# Patient Record
Sex: FEMALE | Race: BLACK OR AFRICAN AMERICAN | Employment: FULL TIME | URBAN - METROPOLITAN AREA
[De-identification: names, ages, dates, MRNs, and addresses within clinical notes are randomized per-mention and may not be internally consistent; named-entity substitution may affect disease eponyms.]

---

## 2017-12-12 ENCOUNTER — HOSPITAL ENCOUNTER (OUTPATIENT)
Dept: LAB | Age: 82
Discharge: HOME | End: 2017-12-12
Payer: MEDICARE

## 2017-12-12 ENCOUNTER — OFFICE VISIT (OUTPATIENT)
Dept: FAMILY MEDICINE CLINIC | Age: 82
End: 2017-12-12

## 2017-12-12 VITALS
WEIGHT: 189.6 LBS | OXYGEN SATURATION: 98 % | SYSTOLIC BLOOD PRESSURE: 130 MMHG | BODY MASS INDEX: 32.37 KG/M2 | HEART RATE: 69 BPM | HEIGHT: 64 IN | RESPIRATION RATE: 20 BRPM | DIASTOLIC BLOOD PRESSURE: 78 MMHG | TEMPERATURE: 97.8 F

## 2017-12-12 DIAGNOSIS — R10.11 RIGHT UPPER QUADRANT ABDOMINAL PAIN: Primary | ICD-10-CM

## 2017-12-12 DIAGNOSIS — J06.9 UPPER RESPIRATORY TRACT INFECTION, UNSPECIFIED TYPE: ICD-10-CM

## 2017-12-12 DIAGNOSIS — Z71.89 ADVANCE CARE PLANNING: ICD-10-CM

## 2017-12-12 DIAGNOSIS — N39.3 URINARY, INCONTINENCE, STRESS FEMALE: ICD-10-CM

## 2017-12-12 DIAGNOSIS — Z13.6 SCREENING FOR CARDIOVASCULAR CONDITION: ICD-10-CM

## 2017-12-12 DIAGNOSIS — R10.11 RIGHT UPPER QUADRANT ABDOMINAL PAIN: ICD-10-CM

## 2017-12-12 LAB
ALBUMIN SERPL-MCNC: 3.8 G/DL (ref 3.4–5)
ALBUMIN/GLOB SERPL: 1.1 {RATIO} (ref 0.8–1.7)
ALP SERPL-CCNC: 84 U/L (ref 45–117)
ALT SERPL-CCNC: 24 U/L (ref 13–56)
ANION GAP SERPL CALC-SCNC: 6 MMOL/L (ref 3–18)
APPEARANCE UR: CLEAR
AST SERPL-CCNC: 23 U/L (ref 15–37)
BASOPHILS # BLD: 0 K/UL (ref 0–0.06)
BASOPHILS NFR BLD: 0 % (ref 0–2)
BILIRUB SERPL-MCNC: 0.8 MG/DL (ref 0.2–1)
BILIRUB UR QL: NEGATIVE
BUN SERPL-MCNC: 13 MG/DL (ref 7–18)
BUN/CREAT SERPL: 23 (ref 12–20)
CALCIUM SERPL-MCNC: 9.1 MG/DL (ref 8.5–10.1)
CHLORIDE SERPL-SCNC: 103 MMOL/L (ref 100–108)
CHOLEST SERPL-MCNC: 219 MG/DL
CO2 SERPL-SCNC: 33 MMOL/L (ref 21–32)
COLOR UR: YELLOW
CREAT SERPL-MCNC: 0.56 MG/DL (ref 0.6–1.3)
DIFFERENTIAL METHOD BLD: NORMAL
EOSINOPHIL # BLD: 0.1 K/UL (ref 0–0.4)
EOSINOPHIL NFR BLD: 1 % (ref 0–5)
ERYTHROCYTE [DISTWIDTH] IN BLOOD BY AUTOMATED COUNT: 13.5 % (ref 11.6–14.5)
GLOBULIN SER CALC-MCNC: 3.6 G/DL (ref 2–4)
GLUCOSE SERPL-MCNC: 122 MG/DL (ref 74–99)
GLUCOSE UR STRIP.AUTO-MCNC: NEGATIVE MG/DL
HCT VFR BLD AUTO: 42.2 % (ref 35–45)
HDLC SERPL-MCNC: 94 MG/DL (ref 40–60)
HDLC SERPL: 2.3 {RATIO} (ref 0–5)
HGB BLD-MCNC: 13.1 G/DL (ref 12–16)
HGB UR QL STRIP: NEGATIVE
KETONES UR QL STRIP.AUTO: NEGATIVE MG/DL
LDLC SERPL CALC-MCNC: 113.6 MG/DL (ref 0–100)
LEUKOCYTE ESTERASE UR QL STRIP.AUTO: NEGATIVE
LIPID PROFILE,FLP: ABNORMAL
LYMPHOCYTES # BLD: 1.7 K/UL (ref 0.9–3.6)
LYMPHOCYTES NFR BLD: 25 % (ref 21–52)
MCH RBC QN AUTO: 27.3 PG (ref 24–34)
MCHC RBC AUTO-ENTMCNC: 31 G/DL (ref 31–37)
MCV RBC AUTO: 87.9 FL (ref 74–97)
MONOCYTES # BLD: 0.6 K/UL (ref 0.05–1.2)
MONOCYTES NFR BLD: 8 % (ref 3–10)
NEUTS SEG # BLD: 4.5 K/UL (ref 1.8–8)
NEUTS SEG NFR BLD: 66 % (ref 40–73)
NITRITE UR QL STRIP.AUTO: NEGATIVE
PH UR STRIP: 7 [PH] (ref 5–8)
PLATELET # BLD AUTO: 211 K/UL (ref 135–420)
PMV BLD AUTO: 10 FL (ref 9.2–11.8)
POTASSIUM SERPL-SCNC: 4.2 MMOL/L (ref 3.5–5.5)
PROT SERPL-MCNC: 7.4 G/DL (ref 6.4–8.2)
PROT UR STRIP-MCNC: NEGATIVE MG/DL
RBC # BLD AUTO: 4.8 M/UL (ref 4.2–5.3)
SODIUM SERPL-SCNC: 142 MMOL/L (ref 136–145)
SP GR UR REFRACTOMETRY: 1.02 (ref 1–1.03)
TRIGL SERPL-MCNC: 57 MG/DL (ref ?–150)
UROBILINOGEN UR QL STRIP.AUTO: 0.2 EU/DL (ref 0.2–1)
VLDLC SERPL CALC-MCNC: 11.4 MG/DL
WBC # BLD AUTO: 6.8 K/UL (ref 4.6–13.2)

## 2017-12-12 PROCEDURE — 81003 URINALYSIS AUTO W/O SCOPE: CPT | Performed by: INTERNAL MEDICINE

## 2017-12-12 PROCEDURE — 36415 COLL VENOUS BLD VENIPUNCTURE: CPT | Performed by: INTERNAL MEDICINE

## 2017-12-12 PROCEDURE — 80061 LIPID PANEL: CPT | Performed by: INTERNAL MEDICINE

## 2017-12-12 PROCEDURE — 85025 COMPLETE CBC W/AUTO DIFF WBC: CPT | Performed by: INTERNAL MEDICINE

## 2017-12-12 PROCEDURE — 80053 COMPREHEN METABOLIC PANEL: CPT | Performed by: INTERNAL MEDICINE

## 2017-12-12 PROCEDURE — 87086 URINE CULTURE/COLONY COUNT: CPT | Performed by: INTERNAL MEDICINE

## 2017-12-12 PROCEDURE — 86803 HEPATITIS C AB TEST: CPT | Performed by: INTERNAL MEDICINE

## 2017-12-12 RX ORDER — MELATONIN
DAILY
COMMUNITY

## 2017-12-12 RX ORDER — OXYBUTYNIN CHLORIDE 5 MG/1
5 TABLET ORAL 3 TIMES DAILY
Qty: 90 TAB | Refills: 0 | Status: SHIPPED | OUTPATIENT
Start: 2017-12-12 | End: 2018-01-15 | Stop reason: SDUPTHER

## 2017-12-12 NOTE — ACP (ADVANCE CARE PLANNING)
Advance Care Planning (ACP) Provider Note - Comprehensive     Date of ACP Conversation: 12/12/17  Persons included in Conversation:  patient  Length of ACP Conversation in minutes:  <16 minutes (Non-Billable)    Authorized Decision Maker (if patient is incapable of making informed decisions): This person is:  Healthcare Agent/Medical Power of  under Advance Directive          General ACP for ALL Patients with Decision Making Capacity:   Importance of advance care planning, including choosing a healthcare agent to communicate patient's healthcare decisions if patient lost the ability to make decisions, such as after a sudden illness or accident  Understanding of the healthcare agent role was assessed and information provided  Exploration of values, goals, and preferences if recovery is not expected, even with continued medical treatment in the event of: Imminent death  Severe, permanent brain injury  \"In these circumstances, what matters most to you? \"  Care focused more on comfort or quality of life. Opportunity offered to explore how cultural, Mandaen, spiritual, or personal beliefs would affect decisions for future care       For Serious or Chronic Illness:  Understanding of medical condition    Understanding of CPR, goals and expected outcomes, benefits and burdens discussed. Information on CPR success rates provided (e.g. for CPR in hospital, survival to d/c at two weeks is 22%, for chronically ill or elderly/frail survival is less than 3%); Individual asked to communicate understanding of information in his/her own words.   Explored fears and concerns regarding CPR or possible outcomes    Interventions Provided:  Recommended completion of Advance Directive form after review of ACP materials and conversation with prospective healthcare agent   Recommended communicating the plan and making copies for the healthcare agent, personal physician, and others as appropriate (e.g., health system)  Recommended review of completed ACP document annually or upon change in health status

## 2017-12-12 NOTE — PROGRESS NOTES
1. Have you been to the ER, urgent care clinic since your last visit? Hospitalized since your last visit? No    2. Have you seen or consulted any other health care providers outside of the [address] [place] [address] since your last visit? Include any pap smears or colon screening.  No

## 2017-12-12 NOTE — MR AVS SNAPSHOT
Visit Information Date & Time Provider Department Dept. Phone Encounter #  
 12/12/2017  9:00 AM Damaris Tinoco, [address] [facility] [phone] [id number] Follow-up Instructions Return in about 4 weeks (around 1/9/2018) for follow-up. Follow-up and Disposition History Upcoming Health Maintenance Date Due DTaP/Tdap/Td series (1 - Tdap) 9/15/1956 ZOSTER VACCINE AGE 60> 7/15/1995 GLAUCOMA SCREENING Q2Y 9/15/2000 Pneumococcal 65+ Low/Medium Risk (1 of 2 - PCV13) 9/15/2000 MEDICARE YEARLY EXAM 9/15/2000 Allergies as of 12/12/2017  Review Complete On: 12/12/2017 By: Damaris Tinoco MD  
 No Known Allergies Current Immunizations  Never Reviewed No immunizations on file. Not reviewed this visit You Were Diagnosed With   
  
 Codes Comments Right upper quadrant abdominal pain    -  Primary ICD-10-CM: R10.11 ICD-9-CM: 789.01 Screening for cardiovascular condition     ICD-10-CM: Z13.6 ICD-9-CM: V81.2 Urinary, incontinence, stress female     ICD-10-CM: N39.3 ICD-9-CM: 625.6 Upper respiratory tract infection, unspecified type     ICD-10-CM: J06.9 ICD-9-CM: 465.9 Advance care planning     ICD-10-CM: Z71.89 ICD-9-CM: V65.49 Vitals BP Pulse Temp Resp Height(growth percentile) Weight(growth percentile) 130/78 69 97.8 °F (36.6 °C) (Oral) 20 5' 4\" (1.626 m) 189 lb 9.6 oz (86 kg) SpO2 BMI OB Status Smoking Status 98% 32.54 kg/m2 Postmenopausal Never Smoker BMI and BSA Data Body Mass Index Body Surface Area 32.54 kg/m 2 1.97 m 2 Preferred Pharmacy Pharmacy Name Phone [facility] D[address] Lowry [phone], [phone] [facility] [phone] Your Updated Medication List  
  
   
This list is accurate as of: 12/12/17  9:31 AM.  Always use your most recent med list.  
  
  
  
  
 calcium 500 mg Tab Take  by mouth. MULTIVITAMIN PO Take  by mouth. oxybutynin 5 mg tablet Commonly known as:  OWSIQKFV Take 1 Tab by mouth three (3) times daily. VITAMIN D3 1,000 unit tablet Generic drug:  cholecalciferol Take  by mouth daily. Prescriptions Sent to Pharmacy Refills  
 oxybutynin (DITROPAN) 5 mg tablet 0 Sig: Take 1 Tab by mouth three (3) times daily. Class: Normal  
 Pharmacy: [address] [place] [address] #: [phone] Route: Oral  
  
Follow-up Instructions Return in about 4 weeks (around 1/9/2018) for follow-up. To-Do List   
 12/12/2017 Lab:  CBC WITH AUTOMATED DIFF   
  
 12/12/2017 Microbiology:  CULTURE, URINE   
  
 12/12/2017 Lab:  HCV AB W/RFLX TO MARILY   
  
 12/12/2017 Lab:  LIPID PANEL   
  
 12/12/2017 Lab:  METABOLIC PANEL, COMPREHENSIVE Around 12/12/2017 Lab:  URINALYSIS W/ RFLX MICROSCOPIC Introducing [facility] & HEALTH SERVICES! Rico Fuller introduces ecoVent patient portal. Now you can access parts of your medical record, email your doctor's office, and request medication refills online. 1. In your internet browser, go to [email or web address] 2. Click on the First Time User? Click Here link in the Sign In box. You will see the New Member Sign Up page. 3. Enter your ecoVent Access Code exactly as it appears below. You will not need to use this code after youve completed the sign-up process. If you do not sign up before the expiration date, you must request a new code. · ecoVent Access Code: [email or web address] Expires: 3/12/2018  8:40 AM 
 
4. Enter the last four digits of your Social Security Number (xxxx) and Date of Birth (mm/dd/yyyy) as indicated and click Submit. You will be taken to the next sign-up page. 5. Create a Clear Blue Technologiest ID. This will be your Clear Blue Technologiest login ID and cannot be changed, so think of one that is secure and easy to remember. 6. Create a Purewine password. You can change your password at any time. 7. Enter your Password Reset Question and Answer. This can be used at a later time if you forget your password. 8. Enter your e-mail address. You will receive e-mail notification when new information is available in [address] 9. Click Sign Up. You can now view and download portions of your medical record. 10. Click the Download Summary menu link to download a portable copy of your medical information. If you have questions, please visit the Frequently Asked Questions section of the Purewine website. Remember, Purewine is NOT to be used for urgent needs. For medical emergencies, dial 911. Now available from your iPhone and Android! Please provide this summary of care documentation to your next provider. Your primary care clinician is listed as Jesus Garcia. If you have any questions after today's visit, please call [phone].

## 2017-12-13 LAB
HCV AB S/CO SERPL IA: 0.2 S/CO RATIO (ref 0–0.9)
HCV AB SERPL QL IA: NORMAL

## 2017-12-14 LAB
BACTERIA SPEC CULT: ABNORMAL
SERVICE CMNT-IMP: ABNORMAL

## 2018-01-09 ENCOUNTER — OFFICE VISIT (OUTPATIENT)
Dept: FAMILY MEDICINE CLINIC | Age: 83
End: 2018-01-09

## 2018-01-09 VITALS
DIASTOLIC BLOOD PRESSURE: 76 MMHG | RESPIRATION RATE: 16 BRPM | WEIGHT: 192.4 LBS | BODY MASS INDEX: 32.85 KG/M2 | SYSTOLIC BLOOD PRESSURE: 136 MMHG | OXYGEN SATURATION: 98 % | HEIGHT: 64 IN | HEART RATE: 66 BPM | TEMPERATURE: 96.6 F

## 2018-01-09 DIAGNOSIS — Z00.00 MEDICARE ANNUAL WELLNESS VISIT, SUBSEQUENT: ICD-10-CM

## 2018-01-09 DIAGNOSIS — N39.3 URINARY, INCONTINENCE, STRESS FEMALE: Primary | ICD-10-CM

## 2018-01-09 DIAGNOSIS — M85.80 OSTEOPENIA, SENILE: ICD-10-CM

## 2018-01-09 DIAGNOSIS — Z13.39 SCREENING FOR ALCOHOLISM: ICD-10-CM

## 2018-01-09 DIAGNOSIS — Z71.89 ADVANCE CARE PLANNING: ICD-10-CM

## 2018-01-09 DIAGNOSIS — Z13.31 SCREENING FOR DEPRESSION: ICD-10-CM

## 2018-01-09 DIAGNOSIS — E78.00 PURE HYPERCHOLESTEROLEMIA: ICD-10-CM

## 2018-01-09 DIAGNOSIS — R13.19 ESOPHAGEAL DYSPHAGIA: ICD-10-CM

## 2018-01-09 DIAGNOSIS — Z23 ENCOUNTER FOR IMMUNIZATION: ICD-10-CM

## 2018-01-09 PROBLEM — R10.11 RIGHT UPPER QUADRANT ABDOMINAL PAIN: Status: RESOLVED | Noted: 2017-12-12 | Resolved: 2018-01-09

## 2018-01-09 RX ORDER — PANTOPRAZOLE SODIUM 20 MG/1
20 TABLET, DELAYED RELEASE ORAL DAILY
Qty: 60 TAB | Refills: 0 | Status: SHIPPED | OUTPATIENT
Start: 2018-01-09 | End: 2018-03-13 | Stop reason: SDUPTHER

## 2018-01-09 NOTE — MR AVS SNAPSHOT
Visit Information Date & Time Provider Department Dept. Phone Encounter #  
 1/9/2018  9:00 AM Estrella Herrera, [address] [facility] [phone] [id number] Follow-up Instructions Return in about 2 months (around 3/9/2018) for rov. Upcoming Health Maintenance Date Due DTaP/Tdap/Td series (1 - Tdap) 9[birth year]15/1956 ZOSTER VACCINE AGE 60> 7/15/1995 GLAUCOMA SCREENING Q2Y 9/15/2000 Pneumococcal 65+ Low/Medium Risk (1 of 2 - PCV13) 9/15/2000 MEDICARE YEARLY EXAM 9/15/2000 Allergies as of 1/9/2018  Review Complete On: 1/9/2018 By: Estrella Herrera MD  
 No Known Allergies Current Immunizations  Never Reviewed Name Date Influenza High Dose Vaccine PF  Incomplete Pneumococcal Polysaccharide (PPSV-23)  Incomplete Not reviewed this visit You Were Diagnosed With   
  
 Codes Comments Urinary, incontinence, stress female    -  Primary ICD-10-CM: N39.3 ICD-9-CM: 625.6 Pure hypercholesterolemia     ICD-10-CM: E78.00 ICD-9-CM: 272.0 Esophageal dysphagia     ICD-10-CM: R13.10 ICD-9-CM: 787.20 Advance care planning     ICD-10-CM: Z71.89 ICD-9-CM: V65.49 Medicare annual wellness visit, subsequent     ICD-10-CM: Z00.00 ICD-9-CM: V70.0 Screening for depression     ICD-10-CM: Z13.89 ICD-9-CM: V79.0 Screening for alcoholism     ICD-10-CM: Z13.89 ICD-9-CM: V79.1 Encounter for immunization     ICD-10-CM: B27 ICD-9-CM: V03.89 Osteopenia, senile     ICD-10-CM: M85.80 ICD-9-CM: 733.90 Vitals BP Pulse Temp Resp Height(growth percentile) Weight(growth percentile) 136/76 (BP 1 Location: Left arm, BP Patient Position: At rest) 66 96.6 °F (35.9 °C) (Oral) 16 5' 4\" (1.626 m) 192 lb 6.4 oz (87.3 kg) SpO2 BMI OB Status Smoking Status 98% 33.03 kg/m2 Postmenopausal Never Smoker Vitals History BMI and BSA Data Body Mass Index Body Surface Area  33.03 kg/m 2 1.99 m 2  
  
  
 Preferred Pharmacy Pharmacy Name Phone [address] [facility] [phone] Your Updated Medication List  
  
   
This list is accurate as of: 18  9:47 AM.  Always use your most recent med list.  
  
  
  
  
 calcium 500 mg Tab Take  by mouth. diph,Pertuss(AC),Tet Vac-PF 2.5-8-5 Lf-mcg suspension Commonly known as:  BOOSTRIX  
0.5 mL by IntraMUSCular route once for 1 dose. MULTIVITAMIN PO Take  by mouth. oxybutynin 5 mg tablet Commonly known as:  TZEJRKMK Take 1 Tab by mouth three (3) times daily. pantoprazole 20 mg tablet Commonly known as:  PROTONIX Take 1 Tab by mouth daily. 30 minutes before breakfast  
  
 varicella zoster vaccine live 19,400 unit/0.65 mL Susr injection Commonly known as:  varicella-zoster vacine live 1 Vial by SubCUTAneous route once for 1 dose. VITAMIN D3 1,000 unit tablet Generic drug:  cholecalciferol Take  by mouth daily. Prescriptions Printed Refills diph,Pertuss,AC,,Tet Vac-PF (BOOSTRIX) 2.5-8-5 Lf-mcg suspension 0 Si.5 mL by IntraMUSCular route once for 1 dose. Class: Print Route: IntraMUSCular  
 varicella zoster vaccine live (VARICELLA-ZOSTER VACINE LIVE) 19,400 unit/0.65 mL susr injection 0 Si Vial by SubCUTAneous route once for 1 dose. Class: Print Route: SubCUTAneous Prescriptions Sent to Pharmacy Refills  
 pantoprazole (PROTONIX) 20 mg tablet 0 Sig: Take 1 Tab by mouth daily. 30 minutes before breakfast  
 Class: Normal  
 Pharmacy: [address] [place] [address]  [facility] Ph #: [phone] Route: Oral  
  
We Performed the Following ADMIN PNEUMOCOCCAL VACCINE [ HCPCS] [address] [[id number] HCPCS] INFLUENZA VIRUS VACCINE, HIGH DOSE SEASONAL, PRESERVATIVE FREE [31388 CPT(R)]  PNEUMOCOCCAL POLYSACCHARIDE VACCINE, 23-VALENT, ADULT OR IMMUNOSUPPRESSED PT DOSE, [id number] CPT(R)] AR ANNUAL ALCOHOL SCREEN 15 MIN [id number] [facility]] Follow-up Instructions Return in about 2 months (around 3/9/2018) for rov. Patient Instructions Medicare Wellness Visit, Female The best way to live healthy is to have a healthy lifestyle by eating a well-balanced diet, exercising regularly, limiting alcohol and stopping smoking. Regular physical exams and screening tests are another way to keep healthy. Preventive exams provided by your health care provider can find health problems before they become diseases or illnesses. Preventive services including immunizations, screening tests, monitoring and exams can help you take care of your own health. All people over age 72 should have a pneumovax  and and a prevnar shot to prevent pneumonia. These are once in a lifetime unless you and your provider decide differently. All people over 65 should have a yearly flu shot and a tetanus vaccine every 10 years. A bone mass density to screen for osteoporosis or thinning of the bones should be done every 2 years after 65. Screening for diabetes mellitus with a blood sugar test should be done every year. Glaucoma is a disease of the eye due to increased ocular pressure that can lead to blindness and it should be done every year by an eye professional. 
 
Cardiovascular screening tests that check for elevated lipids (fatty part of blood) which can lead to heart disease and strokes should be done every 5 years. Colorectal screening that evaluates for blood or polyps in your colon should be done yearly as a stool test or every five years as a flexible sigmoidoscope or every 10 years as a colonoscopy up to age 76. Breast cancer screening with a mammogram is recommended biennially  for women age 54-69.  
 
Screening for cervical cancer with a pap smear and pelvic exam is recommended for women after age 72 years every 2 years up to age 79 or when the provider and patient decide to stop. If there is a history of cervical abnormalities or other increased risk for cancer then the test is recommended yearly. Hepatitis C screening is also recommended for anyone born between 80 through [address]. A shingles vaccine is also recommended once in a lifetime after age 61. Your Medicare Wellness Exam is recommended annually. Here is a list of your current Health Maintenance items with a due date: 
Health Maintenance Due Topic Date Due  
 DTaP/Tdap/Td  (1 - Tdap) 09[birth year]15/1956  Shingles Vaccine  07/15/1995  Glaucoma Screening   09/15/2000  Pneumococcal Vaccine (1 of 2 - PCV13) 09/15/2000 [employer] Annual Well Visit  09/15/2000 Vaccine Information Statement Influenza (Flu) Vaccine (Inactivated or Recombinant): What you need to know Many Vaccine Information Statements are available in Greenlandic and other languages. See www.immunize.org/vis Hojas de Información Sobre Vacunas están disponibles en Español y en muchos otros idiomas. Visite www.immunize.org/vis 1. Why get vaccinated? Influenza (flu) is a contagious disease that spreads around the [facility] every year, usually between October and May. Flu is caused by influenza viruses, and is spread mainly by coughing, sneezing, and close contact. Anyone can get flu. Flu strikes suddenly and can last several days. Symptoms vary by age, but can include: 
 fever/chills  sore throat  muscle aches  fatigue  cough  headache  runny or stuffy nose Flu can also lead to pneumonia and blood infections, and cause diarrhea and seizures in children. If you have a medical condition, such as heart or lung disease, flu can make it worse. Flu is more dangerous for some people. Infants and young children, people 72years of age and older, pregnant women, and people with certain health conditions or a weakened immune system are at greatest risk. Each year thousands of people in the [facility] die from flu, and many more are hospitalized. Flu vaccine can: 
 keep you from getting flu, 
 make flu less severe if you do get it, and 
 keep you from spreading flu to your family and other people. 2. Inactivated and recombinant flu vaccines A dose of flu vaccine is recommended every flu season. Children 6 months through 6years of age may need two doses during the same flu season. Everyone else needs only one dose each flu season. Some inactivated flu vaccines contain a very small amount of a mercury-based preservative called thimerosal. Studies have not shown thimerosal in vaccines to be harmful, but flu vaccines that do not contain thimerosal are available. There is no live flu virus in flu shots. They cannot cause the flu. There are many flu viruses, and they are always changing. Each year a new flu vaccine is made to protect against three or four viruses that are likely to cause disease in the upcoming flu season. But even when the vaccine doesnt exactly match these viruses, it may still provide some protection Flu vaccine cannot prevent: 
 flu that is caused by a virus not covered by the vaccine, or 
 illnesses that look like flu but are not. It takes about 2 weeks for protection to develop after vaccination, and protection lasts through the flu season. 3. Some people should not get this vaccine Tell the person who is giving you the vaccine:  If you have any severe, life-threatening allergies. If you ever had a life-threatening allergic reaction after a dose of flu vaccine, or have a severe allergy to any part of this vaccine, you may be advised not to get vaccinated. Most, but not all, types of flu vaccine contain a small amount of egg protein.  If you ever had Guillain-Barré Syndrome (also called GBS). Some people with a history of GBS should not get this vaccine.  This should be discussed with your doctor.  If you are not feeling well. It is usually okay to get flu vaccine when you have a mild illness, but you might be asked to come back when you feel better. 4. Risks of a vaccine reaction With any medicine, including vaccines, there is a chance of reactions. These are usually mild and go away on their own, but serious reactions are also possible. Most people who get a flu shot do not have any problems with it. Minor problems following a flu shot include:  
 soreness, redness, or swelling where the shot was given  hoarseness  sore, red or itchy eyes  cough  fever  aches  headache  itching  fatigue If these problems occur, they usually begin soon after the shot and last 1 or 2 days. More serious problems following a flu shot can include the following:  There may be a small increased risk of Guillain-Barré Syndrome (GBS) after inactivated flu vaccine. This risk has been estimated at 1 or 2 additional cases per million people vaccinated. This is much lower than the risk of severe complications from flu, which can be prevented by flu vaccine.  Young children who get the flu shot along with pneumococcal vaccine (PCV13) and/or DTaP vaccine at the same time might be slightly more likely to have a seizure caused by fever. Ask your doctor for more information. Tell your doctor if a child who is getting flu vaccine has ever had a seizure. Problems that could happen after any injected vaccine:  People sometimes faint after a medical procedure, including vaccination. Sitting or lying down for about 15 minutes can help prevent fainting, and injuries caused by a fall. Tell your doctor if you feel dizzy, or have vision changes or ringing in the ears.  Some people get severe pain in the shoulder and have difficulty moving the arm where a shot was given. This happens very rarely.  Any medication can cause a severe allergic reaction. Such reactions from a vaccine are very rare, estimated at about 1 in a million doses, and would happen within a few minutes to a few hours after the vaccination. As with any medicine, there is a very remote chance of a vaccine causing a serious injury or death. The safety of vaccines is always being monitored. For more information, visit: www.cdc.gov/vaccinesafety/ 
 
5. What if there is a serious reaction? What should I look for?  Look for anything that concerns you, such as signs of a severe allergic reaction, very high fever, or unusual behavior. Signs of a severe allergic reaction can include hives, swelling of the face and throat, difficulty breathing, a fast heartbeat, dizziness, and weakness  usually within a few minutes to a few hours after the vaccination. What should I do?  If you think it is a severe allergic reaction or other emergency that cant wait, call 9-1-1 and get the person to the nearest hospital. Otherwise, call your doctor.  Reactions should be reported to the Vaccine Adverse Event Reporting System (VAERS). Your doctor should file this report, or you can do it yourself through  the VAERS web site at www.vaers. [facility].gov, or by calling 6-288.266.3455. VAERS does not give medical advice. 6. The National Vaccine Injury Compensation Program 
 
The [facility] Vaccine Injury Compensation Program (VICP) is a federal program that was created to compensate people who may have been injured by certain vaccines. Persons who believe they may have been injured by a vaccine can learn about the program and about filing a claim by calling 4-574.179.6391 or visiting the [employer] website at www.[facility].gov/vaccinecompensation. There is a time limit to file a claim for compensation. 7. How can I learn more?  Ask your healthcare provider. He or she can give you the vaccine package insert or suggest other sources of information.  Call your local or state health department.  Contact the Centers for Disease Control and Prevention (CDC): 
- Call 1-706.333.8208 (1-800-CDC-INFO) or 
- Visit CDCs website at www.cdc.gov/flu Vaccine Information Statement Inactivated Influenza Vaccine 8/7/2015 
[address] 420CM-50 [facility] and myJambi Centers for Disease Control and Prevention Office Use Only Introducing [facility] & HEALTH SERVICES! [employer] introduces UCAN patient portal. Now you can access parts of your medical record, email your doctor's office, and request medication refills online. 1. In your internet browser, go to [email or web address] 2. Click on the First Time User? Click Here link in the Sign In box. You will see the New Member Sign Up page. 3. Enter your UCAN Access Code exactly as it appears below. You will not need to use this code after youve completed the sign-up process. If you do not sign up before the expiration date, you must request a new code. · UCAN Access Code: [email or web address] Expires: 3/12/2018  8:40 AM 
 
4. Enter the last four digits of your Social Security Number (xxxx) and Date of Birth (mm/dd/yyyy) as indicated and click Submit. You will be taken to the next sign-up page. 5. Create a UCAN ID. This will be your UCAN login ID and cannot be changed, so think of one that is secure and easy to remember. 6. Create a UCAN password. You can change your password at any time. 7. Enter your Password Reset Question and Answer. This can be used at a later time if you forget your password. 8. Enter your e-mail address. You will receive e-mail notification when new information is available in [address] 9. Click Sign Up. You can now view and download portions of your medical record. 10. Click the Download Summary menu link to download a portable copy of your medical information. If you have questions, please visit the Frequently Asked Questions section of the ElectroCoret website. Remember, Clerky is NOT to be used for urgent needs. For medical emergencies, dial 911. Now available from your iPhone and Android! Please provide this summary of care documentation to your next provider. Your primary care clinician is listed as Iva Marinelli. If you have any questions after today's visit, please call [phone].

## 2018-01-09 NOTE — PROGRESS NOTES
History of Present Illness  Luciana Evans is a 80 y.o. female who presents today for management of    Chief Complaint   Patient presents with   [facility] Annual Wellness Visit         Dysphagia     x 2 weeks pt states she wakes up with heartburn or indigestion        Dysphagia  Patient complains of intermittent sensation of food being stuck in her throat. It occurs with solid food, but not with liquids. She admits to not chewing her food well. She also complains of neck tenderness. She is not sure if she has heartburn. No nausea, vomiting, weight loss, dark stools. Urinary Incontinence  She  also presents with  incontinence problems. Symptoms were first noted several months ago. Symptoms include: urine leakage with coughing/heavy physical activity and urge to urinate comes with little or no warning. She usually wears mini pads for protection. Previous treatment includes: oral medication. She denies hematuria, dysuria, vaginal burning, flank pain and fevers. She started oxybutynin one month ago with good results. Problem List  Patient Active Problem List    Diagnosis Date Noted    Pure hypercholesterolemia 01/09/2018    Osteopenia, senile 01/09/2018    Urinary, incontinence, stress female 12/12/2017    Advance care planning 12/12/2017       Past Medical History  Past Medical History:   Diagnosis Date    Osteopenia     Urinary incontinence         Surgical History  Past Surgical History:   Procedure Laterality Date    HX ANKLE FRACTURE TX      HX TUBAL LIGATION      25 years ago        Current Medications  Current Outpatient Prescriptions   Medication Sig    pantoprazole (PROTONIX) 20 mg tablet Take 1 Tab by mouth daily. 30 minutes before breakfast    diph,Pertuss,AC,,Tet Vac-PF (BOOSTRIX) 2.5-8-5 Lf-mcg suspension 0.5 mL by IntraMUSCular route once for 1 dose.     varicella zoster vaccine live (VARICELLA-ZOSTER VACINE LIVE) 19,400 unit/0.65 mL susr injection 1 Vial by SubCUTAneous route once for 1 dose.    cholecalciferol (VITAMIN D3) 1,000 unit tablet Take  by mouth daily.  oxybutynin (DITROPAN) 5 mg tablet Take 1 Tab by mouth three (3) times daily.  calcium 500 mg Tab Take  by mouth.  MULTIVITAMIN PO Take  by mouth. No current facility-administered medications for this visit. Allergies/Drug Reactions  No Known Allergies     Family History  Family History   Problem Relation Age of Onset    Hypertension Father         Social History  Social History     Social History    Marital status:      Spouse name: N/A    Number of children: N/A    Years of education: N/A     Occupational History    Not on file.      Social History Main Topics    Smoking status: Never Smoker    Smokeless tobacco: Never Used    Alcohol use No      Comment: occ    Drug use: No    Sexual activity: No     Other Topics Concern    Not on file     Social History Narrative       Review of Systems  General ROS: negative for - chills, fatigue or fever  Ophthalmic ROS: negative  ENT ROS: positive for - sore throat and vocal changes  Respiratory ROS: no cough, shortness of breath, or wheezing  Cardiovascular ROS: no chest pain or dyspnea on exertion  Gastrointestinal ROS: positive for - dysphagia  Genito-Urinary ROS: positive for - incontinence  Musculoskeletal ROS: negative  Neurological ROS: negative      Physical Exam  Vital signs:   Vitals:    01/09/18 0905   BP: 136/76   Pulse: 66   Resp: 16   Temp: 96.6 °F (35.9 °C)   TempSrc: Oral   SpO2: 98%   Weight: 192 lb 6.4 oz (87.3 kg)   Height: 5' 4\" (1.626 m)       General: alert, oriented, not in distress  Neck: supple, no tender or enlarged lymph nodes, nonpalpable thyroid  Chest/Lungs: clear breath sounds, no wheezing or crackles  Heart: normal rate, regular rhythm, no murmur  Abdomen: soft, non-distended, non-tender, normal bowel sounds, no organomegaly, no masses  Extremities: no focal deformities, no edema    Laboratory/Tests:    Component      Latest Ref Rng & Units 12/12/2017           9:36 AM   Sodium      136 - 145 mmol/L    Potassium      3.5 - 5.5 mmol/L    Chloride      100 - 108 mmol/L    CO2      21 - 32 mmol/L    Anion gap      3.0 - 18 mmol/L    Glucose      74 - 99 mg/dL    BUN      7.0 - 18 MG/DL    Creatinine      0.6 - 1.3 MG/DL    BUN/Creatinine ratio      12 - 20      GFR est AA      >60 ml/min/1.73m2    GFR est non-AA      >60 ml/min/1.73m2    Calcium      8.5 - 10.1 MG/DL    Bilirubin, total      0.2 - 1.0 MG/DL    ALT (SGPT)      13 - 56 U/L    AST      15 - 37 U/L    Alk. phosphatase      45 - 117 U/L    Protein, total      6.4 - 8.2 g/dL    Albumin      3.4 - 5.0 g/dL    Globulin      2.0 - 4.0 g/dL    A-G Ratio      0.8 - 1.7      Color          Appearance          Specific gravity      1.003 - 1.030      pH (UA)      5.0 - 8.0      Protein      NEG mg/dL    Glucose      NEG mg/dL    Ketone      NEG mg/dL    Bilirubin      NEG      Blood      NEG      Urobilinogen      0.2 - 1.0 EU/dL    Nitrites      NEG      Leukocyte Esterase      NEG      Special Requests:       NO SPECIAL REQUESTS   Culture result:       <10,000 COLONIES/mL PROTEUS SPECIES (A)     Component      Latest Ref Rng & Units 12/12/2017 12/12/2017           9:36 AM  9:36 AM   Sodium      136 - 145 mmol/L 142    Potassium      3.5 - 5.5 mmol/L 4.2    Chloride      100 - 108 mmol/L 103    CO2      21 - 32 mmol/L 33 (H)    Anion gap      3.0 - 18 mmol/L 6    Glucose      74 - 99 mg/dL 122 (H)    BUN      7.0 - 18 MG/DL 13    Creatinine      0.6 - 1.3 MG/DL 0.56 (L)    BUN/Creatinine ratio      12 - 20   23 (H)    GFR est AA      >60 ml/min/1.73m2 >60    GFR est non-AA      >60 ml/min/1.73m2 >60    Calcium      8.5 - 10.1 MG/DL 9.1    Bilirubin, total      0.2 - 1.0 MG/DL 0.8    ALT (SGPT)      13 - 56 U/L 24    AST      15 - 37 U/L 23    Alk.  phosphatase      45 - 117 U/L 84    Protein, total      6.4 - 8.2 g/dL 7.4    Albumin      3.4 - 5.0 g/dL 3.8    Globulin      2.0 - 4.0 g/dL 3.6    A-G Ratio      0.8 - 1.7   1.1    Color        YELLOW   Appearance        CLEAR   Specific gravity      1.003 - 1.030    1.020   pH (UA)      5.0 - 8.0    7.0   Protein      NEG mg/dL  NEGATIVE   Glucose      NEG mg/dL  NEGATIVE   Ketone      NEG mg/dL  NEGATIVE   Bilirubin      NEG    NEGATIVE   Blood      NEG    NEGATIVE   Urobilinogen      0.2 - 1.0 EU/dL  0.2   Nitrites      NEG    NEGATIVE   Leukocyte Esterase      NEG    NEGATIVE   Special Requests:           Culture result:             Component      Latest Ref Rng & Units 12/12/2017 12/12/2017 12/12/2017           9:36 AM  9:36 AM  9:36 AM   WBC      4.6 - 13.2 K/uL  6.8    RBC      4.20 - 5.30 M/uL  4.80    HGB      12.0 - 16.0 g/dL  13.1    HCT      35.0 - 45.0 %  42.2    MCV      74.0 - 97.0 FL  87.9    MCH      24.0 - 34.0 PG  27.3    MCHC      31.0 - 37.0 g/dL  31.0    RDW      11.6 - 14.5 %  13.5    PLATELET      057 - 600 K/uL  211    MPV      9.2 - 11.8 FL  10.0    NEUTROPHILS      40 - 73 %  66    LYMPHOCYTES      21 - 52 %  25    MONOCYTES      3 - 10 %  8    EOSINOPHILS      0 - 5 %  1    BASOPHILS      0 - 2 %  0    ABS. NEUTROPHILS      1.8 - 8.0 K/UL  4.5    ABS. LYMPHOCYTES      0.9 - 3.6 K/UL  1.7    ABS. MONOCYTES      0.05 - 1.2 K/UL  0.6    ABS. EOSINOPHILS      0.0 - 0.4 K/UL  0.1    ABS. BASOPHILS      0.0 - 0.06 K/UL  0.0    DF        AUTOMATED    Cholesterol, total      <200 MG/DL   219 (H)   Triglyceride      <150 MG/DL   57   HDL Cholesterol      40 - 60 MG/DL   94 (H)   LDL, calculated      0 - 100 MG/DL   113.6 (H)   VLDL, calculated      MG/DL   11.4   CHOL/HDL Ratio      0 - 5.0     2.3   HCV Ab      0.0 - 0.9 s/co ratio 0.2         Assessment/Plan:      1. Urinary, incontinence, stress female  - improved  - continue oxybutynin    2. Pure hypercholesterolemia  - diet-controlled  - low fat diet    3. Esophageal dysphagia  - trial of PPI: pantoprazole (PROTONIX) 20 mg tablet; Take 1 Tab by mouth daily.  30 minutes before breakfast  Dispense: 60 Tab; Refill: 0  - advised to chew food well before swallowing. Follow-up Disposition:  Return in about 2 months (around 3/9/2018) for rov. I have discussed the diagnosis with the patient and the intended plan as seen in the above orders. The patient has received an after-visit summary and questions were answered concerning future plans. I have discussed medication side effects and warnings with the patient as well. I have reviewed the plan of care with the patient, accepted their input and they are in agreement with the treatment goals. Jabari Rodriguez MD  January 9, 2018        This is a Subsequent Medicare Annual Wellness Exam (AWV) (Performed 12 months after IPPE or effective date of Medicare Part B enrollment)    I have reviewed the patient's medical history in detail and updated the computerized patient record. History     Past Medical History:   Diagnosis Date    Osteopenia     Urinary incontinence       Past Surgical History:   Procedure Laterality Date    HX ANKLE FRACTURE TX      HX TUBAL LIGATION      25 years ago     Current Outpatient Prescriptions   Medication Sig Dispense Refill    pantoprazole (PROTONIX) 20 mg tablet Take 1 Tab by mouth daily. 30 minutes before breakfast 60 Tab 0    diph,Pertuss,AC,,Tet Vac-PF (BOOSTRIX) 2.5-8-5 Lf-mcg suspension 0.5 mL by IntraMUSCular route once for 1 dose. 0.5 mL 0    varicella zoster vaccine live (VARICELLA-ZOSTER VACINE LIVE) 19,400 unit/0.65 mL susr injection 1 Vial by SubCUTAneous route once for 1 dose. 0.65 mL 0    cholecalciferol (VITAMIN D3) 1,000 unit tablet Take  by mouth daily.  oxybutynin (DITROPAN) 5 mg tablet Take 1 Tab by mouth three (3) times daily. 90 Tab 0    calcium 500 mg Tab Take  by mouth.  MULTIVITAMIN PO Take  by mouth.          No Known Allergies  Family History   Problem Relation Age of Onset    Hypertension Father      Social History   Substance Use Topics    Smoking status: Never Smoker    Smokeless tobacco: Never Used    Alcohol use No      Comment: occ     Patient Active Problem List   Diagnosis Code    Urinary, incontinence, stress female N39.3    Advance care planning Z71.89    Pure hypercholesterolemia E78.00    Osteopenia, senile M85.80       Depression Risk Factor Screening:     PHQ over the last two weeks 12/12/2017   Little interest or pleasure in doing things Not at all   Feeling down, depressed or hopeless Not at all   Total Score PHQ 2 0     Alcohol Risk Factor Screening: You do not drink alcohol or very rarely. Functional Ability and Level of Safety:   Hearing Loss  Hearing is good. Activities of Daily Living  The home contains: no safety equipment. Patient does total self care    Fall Risk  No flowsheet data found. Abuse Screen  Patient is not abused    Cognitive Screening   Evaluation of Cognitive Function:  Has your family/caregiver stated any concerns about your memory: no  Normal    Patient Care Team   Patient Care Team:  Talita Best MD as PCP - General (Internal Medicine)  Vince Mao MD (Family Practice)    Assessment/Plan   Education and counseling provided:  Are appropriate based on today's review and evaluation  End-of-Life planning (with patient's consent)  Pneumococcal Vaccine  Influenza Vaccine    Diagnoses and all orders for this visit:    1. Urinary, incontinence, stress female    2. Pure hypercholesterolemia    3. Esophageal dysphagia  -     pantoprazole (PROTONIX) 20 mg tablet; Take 1 Tab by mouth daily. 30 minutes before breakfast    4. Advance care planning    5. Medicare annual wellness visit, subsequent    6. Screening for depression  -     Depression Screen Annual    7. Screening for alcoholism  -     Annual  Alcohol Screen 15 min ()    8.  Encounter for immunization  -     diph,Pertuss,AC,,Tet Vac-PF (BOOSTRIX) 2.5-8-5 Lf-mcg suspension; 0.5 mL by IntraMUSCular route once for 1 dose.  -     varicella zoster vaccine live (VARICELLA-ZOSTER VACINE LIVE) 19,400 unit/0.65 mL susr injection; 1 Vial by SubCUTAneous route once for 1 dose. -     Influenza virus vaccine (FLUZONE HIGH-DOSE) 65 years and older  -     Pneumococcal conjugate 13 valent, IM (PREVNAR-13)    9.  Osteopenia, senile        Health Maintenance Due   Topic Date Due    DTaP/Tdap/Td series (1 - Tdap) 09/15/1956    ZOSTER VACCINE AGE 60>  07/15/1995    GLAUCOMA SCREENING Q2Y  09/15/2000    Pneumococcal 65+ Low/Medium Risk (1 of 2 - PCV13) 09/15/2000    MEDICARE YEARLY EXAM  09/15/2000

## 2018-01-09 NOTE — PATIENT INSTRUCTIONS
Medicare Wellness Visit, Female    The best way to live healthy is to have a healthy lifestyle by eating a well-balanced diet, exercising regularly, limiting alcohol and stopping smoking. Regular physical exams and screening tests are another way to keep healthy. Preventive exams provided by your health care provider can find health problems before they become diseases or illnesses. Preventive services including immunizations, screening tests, monitoring and exams can help you take care of your own health. All people over age 72 should have a pneumovax  and and a prevnar shot to prevent pneumonia. These are once in a lifetime unless you and your provider decide differently. All people over 65 should have a yearly flu shot and a tetanus vaccine every 10 years. A bone mass density to screen for osteoporosis or thinning of the bones should be done every 2 years after 65. Screening for diabetes mellitus with a blood sugar test should be done every year. Glaucoma is a disease of the eye due to increased ocular pressure that can lead to blindness and it should be done every year by an eye professional.    Cardiovascular screening tests that check for elevated lipids (fatty part of blood) which can lead to heart disease and strokes should be done every 5 years. Colorectal screening that evaluates for blood or polyps in your colon should be done yearly as a stool test or every five years as a flexible sigmoidoscope or every 10 years as a colonoscopy up to age 76. Breast cancer screening with a mammogram is recommended biennially  for women age 54-69. Screening for cervical cancer with a pap smear and pelvic exam is recommended for women after age 72 years every 2 years up to age 79 or when the provider and patient decide to stop. If there is a history of cervical abnormalities or other increased risk for cancer then the test is recommended yearly.     Hepatitis C screening is also recommended for anyone born between [birth year] through [facility]. A shingles vaccine is also recommended once in a lifetime after age 61. Your Medicare Wellness Exam is recommended annually. Here is a list of your current Health Maintenance items with a due date:  Health Maintenance Due   Topic Date Due    DTaP/Tdap/Td  (1 - Tdap) 09/15/1956    Shingles Vaccine  07/15/1995    Glaucoma Screening   09/15/2000    Pneumococcal Vaccine (1 of 2 - PCV13) 09/15/2000    Annual Well Visit  09/15/2000         Vaccine Information Statement    Influenza (Flu) Vaccine (Inactivated or Recombinant): What you need to know    Many Vaccine Information Statements are available in Yi and other languages. See www.immunize.org/vis  Hojas de Información Sobre Vacunas están disponibles en Español y en muchos otros idiomas. Visite www.immunize.org/vis    1. Why get vaccinated? Influenza (flu) is a contagious disease that spreads around the [place] every year, usually between October and May. Flu is caused by influenza viruses, and is spread mainly by coughing, sneezing, and close contact. Anyone can get flu. Flu strikes suddenly and can last several days. Symptoms vary by age, but can include:   fever/chills   sore throat   muscle aches   fatigue   cough   headache    runny or stuffy nose    Flu can also lead to pneumonia and blood infections, and cause diarrhea and seizures in children. If you have a medical condition, such as heart or lung disease, flu can make it worse. Flu is more dangerous for some people. Infants and young children, people 72years of age and older, pregnant women, and people with certain health conditions or a weakened immune system are at greatest risk. Each year thousands of people in the [facility] die from flu, and many more are hospitalized.      Flu vaccine can:   keep you from getting flu,   make flu less severe if you do get it, and   keep you from spreading flu to your family and other people. 2. Inactivated and recombinant flu vaccines    A dose of flu vaccine is recommended every flu season. Children 6 months through 6years of age may need two doses during the same flu season. Everyone else needs only one dose each flu season. Some inactivated flu vaccines contain a very small amount of a mercury-based preservative called thimerosal. Studies have not shown thimerosal in vaccines to be harmful, but flu vaccines that do not contain thimerosal are available. There is no live flu virus in flu shots. They cannot cause the flu. There are many flu viruses, and they are always changing. Each year a new flu vaccine is made to protect against three or four viruses that are likely to cause disease in the upcoming flu season. But even when the vaccine doesnt exactly match these viruses, it may still provide some protection    Flu vaccine cannot prevent:   flu that is caused by a virus not covered by the vaccine, or   illnesses that look like flu but are not. It takes about 2 weeks for protection to develop after vaccination, and protection lasts through the flu season. 3. Some people should not get this vaccine    Tell the person who is giving you the vaccine:     If you have any severe, life-threatening allergies. If you ever had a life-threatening allergic reaction after a dose of flu vaccine, or have a severe allergy to any part of this vaccine, you may be advised not to get vaccinated. Most, but not all, types of flu vaccine contain a small amount of egg protein.  If you ever had Guillain-Barré Syndrome (also called GBS). Some people with a history of GBS should not get this vaccine. This should be discussed with your doctor.  If you are not feeling well. It is usually okay to get flu vaccine when you have a mild illness, but you might be asked to come back when you feel better.       4. Risks of a vaccine reaction    With any medicine, including vaccines, there is a chance of reactions. These are usually mild and go away on their own, but serious reactions are also possible. Most people who get a flu shot do not have any problems with it. Minor problems following a flu shot include:    soreness, redness, or swelling where the shot was given     hoarseness   sore, red or itchy eyes   cough   fever   aches   headache   itching   fatigue  If these problems occur, they usually begin soon after the shot and last 1 or 2 days. More serious problems following a flu shot can include the following:     There may be a small increased risk of Guillain-Barré Syndrome (GBS) after inactivated flu vaccine. This risk has been estimated at 1 or 2 additional cases per million people vaccinated. This is much lower than the risk of severe complications from flu, which can be prevented by flu vaccine.  Young children who get the flu shot along with pneumococcal vaccine (PCV13) and/or DTaP vaccine at the same time might be slightly more likely to have a seizure caused by fever. Ask your doctor for more information. Tell your doctor if a child who is getting flu vaccine has ever had a seizure. Problems that could happen after any injected vaccine:      People sometimes faint after a medical procedure, including vaccination. Sitting or lying down for about 15 minutes can help prevent fainting, and injuries caused by a fall. Tell your doctor if you feel dizzy, or have vision changes or ringing in the ears.  Some people get severe pain in the shoulder and have difficulty moving the arm where a shot was given. This happens very rarely.  Any medication can cause a severe allergic reaction. Such reactions from a vaccine are very rare, estimated at about 1 in a million doses, and would happen within a few minutes to a few hours after the vaccination.     As with any medicine, there is a very remote chance of a vaccine causing a serious injury or death. The safety of vaccines is always being monitored. For more information, visit: www.cdc.gov/vaccinesafety/    5. What if there is a serious reaction? What should I look for?  Look for anything that concerns you, such as signs of a severe allergic reaction, very high fever, or unusual behavior. Signs of a severe allergic reaction can include hives, swelling of the face and throat, difficulty breathing, a fast heartbeat, dizziness, and weakness  usually within a few minutes to a few hours after the vaccination. What should I do?  If you think it is a severe allergic reaction or other emergency that cant wait, call 9-1-1 and get the person to the nearest hospital. Otherwise, call your doctor.  Reactions should be reported to the Vaccine Adverse Event Reporting System (VAERS). Your doctor should file this report, or you can do it yourself through  the VAERS web site at www.vaers. hhs.gov, or by calling 9-774.976.8987. VAERS does not give medical advice. 6. The National Vaccine Injury Compensation Program    The [facility] Vaccine Injury Compensation Program (VICP) is a federal program that was created to compensate people who may have been injured by certain vaccines. Persons who believe they may have been injured by a vaccine can learn about the program and about filing a claim by calling 3-554.229.4262 or visiting the [facility] [employer] website at [email or web address]/vaccinecompensation. There is a time limit to file a claim for compensation. 7. How can I learn more?  Ask your healthcare provider. He or she can give you the vaccine package insert or suggest other sources of information.  Call your local or state health department.  Contact the Centers for Disease Control and Prevention (CDC):  - Call 7-724.165.6430 (1-800-CDC-INFO) or  - Visit CDCs website at www.cdc.gov/flu    Vaccine Information Statement   Inactivated Influenza Vaccine   8/7/2015  [address] [id number]    Department of Health and Human Services  Centers for Disease Control and Prevention    Office Use Only      Vaccine Information Statement     Pneumococcal Conjugate Vaccine (PCV13): What You Need to Know    Many Vaccine Information Statements are available in Vietnamese and other languages. See www.immunize.org/vis. Hojas de información Sobre Vacunas están disponibles en español y en muchos otros idiomas. Visite www.immunize.org/vis. 1. Why get vaccinated? Vaccination can protect both children and adults from pneumococcal disease. Pneumococcal disease is caused by bacteria that can spread from person to person through close contact. It can cause ear infections, and it can also lead to more serious infections of the:   Lungs (pneumonia),   Blood (bacteremia), and   Covering of the brain and spinal cord (meningitis). Pneumococcal pneumonia is most common among adults. Pneumococcal meningitis can cause deafness and brain damage, and it kills about 1 child in 10 who get it. Anyone can get pneumococcal disease, but children under 3years of age and adults 72 years and older, people with certain medical conditions, and cigarette smokers are at the highest risk. Before there was a vaccine, the [facility] saw:   more than 700 cases of meningitis,   about 13,000 blood infections,   about 5 million ear infections, and   about 200 deaths  in children under 5 each year from pneumococcal disease. Since vaccine became available, severe pneumococcal disease in these children has fallen by 88%. About 18,000 older adults die of pneumococcal disease each year in the [place]. Treatment of pneumococcal infections with penicillin and other drugs is not as effective as it used to be, because some strains of the disease have become resistant to these drugs. This makes prevention of the disease, through vaccination, even more important.     2. PCV13 vaccine    Pneumococcal conjugate vaccine (called PCV13) protects against 13 types of pneumococcal bacteria. PCV13 is routinely given to children at 2, 4, 6, and 1515 months of age. It is also recommended for children and adults 3to 59years of age with certain health conditions, and for all adults 72years of age and older. Your doctor can give you details. 3. Some people should not get this vaccine    Anyone who has ever had a life-threatening allergic reaction to a dose of this vaccine, to an earlier pneumococcal vaccine called PCV7, or to any vaccine containing diphtheria toxoid (for example, DTaP), should not get PCV13. Anyone with a severe allergy to any component of PCV13 should not get the vaccine. Tell your doctor if the person being vaccinated has any severe allergies. If the person scheduled for vaccination is not feeling well, your healthcare provider might decide to reschedule the shot on another day. 4. Risks of a vaccine reaction    With any medicine, including vaccines, there is a chance of reactions. These are usually mild and go away on their own, but serious reactions are also possible. Problems reported following PCV13 varied by age and dose in the series. The most common problems reported among children were:    About half became drowsy after the shot, had a temporary loss of appetite, or had redness or tenderness where the shot was given.  About 1 out of 3 had swelling where the shot was given.  About 1 out of 3 had a mild fever, and about 1 in 20 had a fever over 102.2°F.   Up to about 8 out of 10 became fussy or irritable. Adults have reported pain, redness, and swelling where the shot was given; also mild fever, fatigue, headache, chills, or muscle pain. Israel Coleman children who get PCV13 along with inactivated flu vaccine at the same time may be at increased risk for seizures caused by fever. Ask your doctor for more information.      Problems that could happen after any vaccine:     People sometimes faint after a medical procedure, including vaccination. Sitting or lying down for about 15 minutes can help prevent fainting, and injuries caused by a fall. Tell your doctor if you feel dizzy, or have vision changes or ringing in the ears.  Some older children and adults get severe pain in the shoulder and have difficulty moving the arm where a shot was given. This happens very rarely.  Any medication can cause a severe allergic reaction. Such reactions from a vaccine are very rare, estimated at about 1 in a million doses, and would happen within a few minutes to a few hours after the vaccination. As with any medicine, there is a very small chance of a vaccine causing a serious injury or death. The safety of vaccines is always being monitored. For more information, visit: www.cdc.gov/vaccinesafety/     5. What if there is a serious reaction? What should I look for?  Look for anything that concerns you, such as signs of a severe allergic reaction, very high fever, or unusual behavior. Signs of a severe allergic reaction can include hives, swelling of the face and throat, difficulty breathing, a fast heartbeat, dizziness, and weakness  usually within a few minutes to a few hours after the vaccination. What should I do?  If you think it is a severe allergic reaction or other emergency that cant wait, call 9-1-1 or get the person to the nearest hospital. Otherwise, call your doctor. Reactions should be reported to the Vaccine Adverse Event Reporting System (VAERS). Your doctor should file this report, or you can do it yourself through the VAERS web site at www.vaers. hhs.gov, or by calling 4-140.307.2815. VAERS does not give medical advice. 6. The National Vaccine Injury Compensation Program    The [facility] Vaccine Injury Compensation Program (VICP) is a federal program that was created to compensate people who may have been injured by certain vaccines.     Persons who believe they may have been injured by a vaccine can learn about the program and about filing a claim by calling 8-896.326.9873 or visiting the [facility] [employer] website at [email or web address]/[email or web address]. There is a time limit to file a claim for compensation. 7. How can I learn more?  Ask your healthcare provider. He or she can give you the vaccine package insert or suggest other sources of information.  Call your local or state health department.  Contact the Centers for Disease Control and Prevention (CDC):  - Call 3-288.380.5873 (1-800-CDC-INFO) or  - Visit CDCs website at www.cdc.gov/vaccines    Vaccine Information Statement   PCV13 Vaccine   11/5/2015   42 [address] [id number]    Department of Health and Human Services  Centers for Disease Control and Prevention    Office Use Only

## 2018-01-09 NOTE — PROGRESS NOTES
Chief Complaint   Patient presents with   Joyce Cisse Annual Wellness Visit         Dysphagia     x 2 weeks pt states she wakes up with heartburn or indigestion

## 2018-01-09 NOTE — ACP (ADVANCE CARE PLANNING)
Advance Care Planning (ACP) Provider Note - Comprehensive     Date of ACP Conversation: 01/09/18  Persons included in Conversation:  patient  Length of ACP Conversation in minutes:  <16 minutes (Non-Billable)    Authorized Decision Maker (if patient is incapable of making informed decisions): This person is:  Healthcare Agent/Medical Power of  under Advance Directive          General ACP for ALL Patients with Decision Making Capacity:   Importance of advance care planning, including choosing a healthcare agent to communicate patient's healthcare decisions if patient lost the ability to make decisions, such as after a sudden illness or accident  Understanding of the healthcare agent role was assessed and information provided  Exploration of values, goals, and preferences if recovery is not expected, even with continued medical treatment in the event of: Imminent death  Severe, permanent brain injury  \"In these circumstances, what matters most to you? \" Patient will discuss with her children. Opportunity offered to explore how cultural, Synagogue, spiritual, or personal beliefs would affect decisions for future care     For Serious or Chronic Illness:  Understanding of medical condition    Understanding of CPR, goals and expected outcomes, benefits and burdens discussed. Information on CPR success rates provided (e.g. for CPR in hospital, survival to d/c at two weeks is 22%, for chronically ill or elderly/frail survival is less than 3%); Individual asked to communicate understanding of information in his/her own words.   Explored fears and concerns regarding CPR or possible outcomes    Interventions Provided:  Recommended completion of Advance Directive form after review of ACP materials and conversation with prospective healthcare agent   Recommended communicating the plan and making copies for the healthcare agent, personal physician, and others as appropriate (e.g., health system)  Recommended review of completed ACP document annually or upon change in health status

## 2018-01-15 DIAGNOSIS — N39.3 URINARY, INCONTINENCE, STRESS FEMALE: ICD-10-CM

## 2018-01-16 RX ORDER — OXYBUTYNIN CHLORIDE 5 MG/1
5 TABLET ORAL 3 TIMES DAILY
Qty: 90 TAB | Refills: 0 | Status: SHIPPED | OUTPATIENT
Start: 2018-01-16 | End: 2018-03-13 | Stop reason: SDUPTHER

## 2018-03-13 ENCOUNTER — OFFICE VISIT (OUTPATIENT)
Dept: FAMILY MEDICINE CLINIC | Age: 83
End: 2018-03-13

## 2018-03-13 VITALS
WEIGHT: 188.2 LBS | OXYGEN SATURATION: 100 % | SYSTOLIC BLOOD PRESSURE: 129 MMHG | HEART RATE: 69 BPM | RESPIRATION RATE: 20 BRPM | BODY MASS INDEX: 32.13 KG/M2 | DIASTOLIC BLOOD PRESSURE: 71 MMHG | HEIGHT: 64 IN | TEMPERATURE: 95.9 F

## 2018-03-13 DIAGNOSIS — N39.3 URINARY, INCONTINENCE, STRESS FEMALE: ICD-10-CM

## 2018-03-13 DIAGNOSIS — E78.00 PURE HYPERCHOLESTEROLEMIA: Primary | ICD-10-CM

## 2018-03-13 DIAGNOSIS — R13.19 ESOPHAGEAL DYSPHAGIA: ICD-10-CM

## 2018-03-13 DIAGNOSIS — M85.80 OSTEOPENIA, SENILE: ICD-10-CM

## 2018-03-13 RX ORDER — OXYBUTYNIN CHLORIDE 5 MG/1
5 TABLET ORAL 3 TIMES DAILY
Qty: 90 TAB | Refills: 5 | Status: SHIPPED | OUTPATIENT
Start: 2018-03-13 | End: 2018-08-02 | Stop reason: CLARIF

## 2018-03-13 RX ORDER — PANTOPRAZOLE SODIUM 40 MG/1
40 TABLET, DELAYED RELEASE ORAL DAILY
Qty: 30 TAB | Refills: 2 | Status: SHIPPED | OUTPATIENT
Start: 2018-03-13 | End: 2018-06-12 | Stop reason: SDUPTHER

## 2018-03-13 NOTE — PROGRESS NOTES
History of Present Illness  Luciana Evans is a 80 y.o. female who presents today for management of    Chief Complaint   Patient presents with    Follow-up    Neurogenic Bladder       Urinary Incontinence  She  is an 80 y.o. female seen for  incontinence problems. Symptoms were first noted several months ago. Symptoms include: urge to urinate comes with little or no warning and unpredictable urine leakage. She usually wears heavy pads for protection. Previous treatment includes: oral medication. She denies hematuria, dysuria, vaginal burning and rectal incontinence. She had good results after starting oxybutynin. Dysphagia  Patient complains of intermittent sensation of food being stuck in her throat. It occurs with solid food, but not with liquids. She admits to not chewing her food well. She also complains of neck tenderness. She is not sure if she has heartburn. No nausea, vomiting, weight loss, dark stools. She had never had an endoscopy. Cardiovascular Review:  The patient has hyperlipidemia. Diet and Lifestyle: generally follows a low fat low cholesterol diet, sedentary, nonsmoker  Pertinent ROS: no TIA's, no chest pain on exertion, no dyspnea on exertion, no swelling of ankles. Problem List  Patient Active Problem List    Diagnosis Date Noted    Pure hypercholesterolemia 01/09/2018    Osteopenia, senile 01/09/2018    Urinary, incontinence, stress female 12/12/2017    Advance care planning 12/12/2017       Past Medical History  Past Medical History:   Diagnosis Date    Osteopenia     Urinary incontinence         Surgical History  Past Surgical History:   Procedure Laterality Date    HX ANKLE FRACTURE TX      HX TUBAL LIGATION      25 years ago        Current Medications  Current Outpatient Prescriptions   Medication Sig    oxybutynin (DITROPAN) 5 mg tablet Take 1 Tab by mouth three (3) times daily.  pantoprazole (PROTONIX) 40 mg tablet Take 1 Tab by mouth daily.  30 minutes before breakfast    cholecalciferol (VITAMIN D3) 1,000 unit tablet Take  by mouth daily.  calcium 500 mg Tab Take  by mouth.  MULTIVITAMIN PO Take  by mouth. No current facility-administered medications for this visit. Allergies/Drug Reactions  No Known Allergies     Family History  Family History   Problem Relation Age of Onset    Hypertension Father         Social History  Social History     Social History    Marital status:      Spouse name: N/A    Number of children: N/A    Years of education: N/A     Occupational History    Not on file.      Social History Main Topics    Smoking status: Never Smoker    Smokeless tobacco: Never Used    Alcohol use No      Comment: occ    Drug use: No    Sexual activity: No     Other Topics Concern    Not on file     Social History Narrative       Review of Systems  General ROS: negative  Psychological ROS: negative  Ophthalmic ROS: negative  ENT ROS: negative  Breast ROS: negative for breast lumps  Respiratory ROS: no cough, shortness of breath, or wheezing  Cardiovascular ROS: no chest pain or dyspnea on exertion  Gastrointestinal ROS: no abdominal pain, change in bowel habits, or black or bloody stools  positive for - dysphagia  Genito-Urinary ROS: no dysuria, trouble voiding, or hematuria  positive for - incontinence  Musculoskeletal ROS: negative  Neurological ROS: no TIA or stroke symptoms      Physical Exam  Vital signs:   Vitals:    03/13/18 0848   BP: 129/71   Pulse: 69   Resp: 20   Temp: 95.9 °F (35.5 °C)   TempSrc: Oral   SpO2: 100%   Weight: 188 lb 3.2 oz (85.4 kg)   Height: 5' 4\" (1.626 m)       General: alert, oriented, not in distress  Chest/Lungs: clear breath sounds, no wheezing or crackles  Heart: normal rate, regular rhythm, no murmur  Abdomen: soft, non-distended, non-tender, normal bowel sounds, no organomegaly, no masses  Extremities: no focal deformities, no edema    Laboratory/Tests:  Component      Latest Ref Rng & Units 12/12/2017 12/12/2017 12/12/2017           9:36 AM  9:36 AM  9:36 AM   Sodium      136 - 145 mmol/L 142     Potassium      3.5 - 5.5 mmol/L 4.2     Chloride      100 - 108 mmol/L 103     CO2      21 - 32 mmol/L 33 (H)     Anion gap      3.0 - 18 mmol/L 6     Glucose      74 - 99 mg/dL 122 (H)     BUN      7.0 - 18 MG/DL 13     Creatinine      0.6 - 1.3 MG/DL 0.56 (L)     BUN/Creatinine ratio      12 - 20   23 (H)     GFR est AA      >60 ml/min/1.73m2 >60     GFR est non-AA      >60 ml/min/1.73m2 >60     Calcium      8.5 - 10.1 MG/DL 9.1     Bilirubin, total      0.2 - 1.0 MG/DL 0.8     ALT (SGPT)      13 - 56 U/L 24     AST      15 - 37 U/L 23     Alk.  phosphatase      45 - 117 U/L 84     Protein, total      6.4 - 8.2 g/dL 7.4     Albumin      3.4 - 5.0 g/dL 3.8     Globulin      2.0 - 4.0 g/dL 3.6     A-G Ratio      0.8 - 1.7   1.1     Color         YELLOW   Appearance         CLEAR   Specific gravity      1.003 - 1.030     1.020   pH (UA)      5.0 - 8.0     7.0   Protein      NEG mg/dL   NEGATIVE   Glucose      NEG mg/dL   NEGATIVE   Ketone      NEG mg/dL   NEGATIVE   Bilirubin      NEG     NEGATIVE   Blood      NEG     NEGATIVE   Urobilinogen      0.2 - 1.0 EU/dL   0.2   Nitrites      NEG     NEGATIVE   Leukocyte Esterase      NEG     NEGATIVE   Cholesterol, total      <200 MG/DL  219 (H)    Triglyceride      <150 MG/DL  57    HDL Cholesterol      40 - 60 MG/DL  94 (H)    LDL, calculated      0 - 100 MG/DL  113.6 (H)    VLDL, calculated      MG/DL  11.4    CHOL/HDL Ratio      0 - 5.0    2.3      Component      Latest Ref Rng & Units 12/12/2017 12/12/2017           9:36 AM  9:36 AM   WBC      4.6 - 13.2 K/uL  6.8   RBC      4.20 - 5.30 M/uL  4.80   HGB      12.0 - 16.0 g/dL  13.1   HCT      35.0 - 45.0 %  42.2   MCV      74.0 - 97.0 FL  87.9   MCH      24.0 - 34.0 PG  27.3   MCHC      31.0 - 37.0 g/dL  31.0   RDW      11.6 - 14.5 %  13.5   PLATELET      415 - 323 K/uL  211   MPV      9.2 - 11.8 FL  10.0 NEUTROPHILS      40 - 73 %  66   LYMPHOCYTES      21 - 52 %  25   MONOCYTES      3 - 10 %  8   EOSINOPHILS      0 - 5 %  1   BASOPHILS      0 - 2 %  0   ABS. NEUTROPHILS      1.8 - 8.0 K/UL  4.5   ABS. LYMPHOCYTES      0.9 - 3.6 K/UL  1.7   ABS. MONOCYTES      0.05 - 1.2 K/UL  0.6   ABS. EOSINOPHILS      0.0 - 0.4 K/UL  0.1   ABS. BASOPHILS      0.0 - 0.06 K/UL  0.0   DF        AUTOMATED   HCV Ab      0.0 - 0.9 s/co ratio 0.2        Assessment/Plan:      1. Pure hypercholesterolemia  - diet-controlled  - low fat diet    2. Osteopenia, senile  - continue calcium with vitamin D  - continue exercises    3. Urinary, incontinence, stress female  - improved  - continue oxybutynin (DITROPAN) 5 mg tablet; Take 1 Tab by mouth three (3) times daily. Dispense: 90 Tab; Refill: 5    4. Esophageal dysphagia  - XR UPPER GI W KUB AIR CONT; Future  - increase dose of  pantoprazole (PROTONIX) 40 mg tablet; Take 1 Tab by mouth daily. 30 minutes before breakfast  Dispense: 30 Tab; Refill: 2    Total visit time was 25 minutes of which more than 50% was devoted to counseling and coordination of care. Follow-up Disposition:  Return in about 3 months (around 6/13/2018) for rov. I have discussed the diagnosis with the patient and the intended plan as seen in the above orders. The patient has received an after-visit summary and questions were answered concerning future plans. I have discussed medication side effects and warnings with the patient as well. I have reviewed the plan of care with the patient, accepted their input and they are in agreement with the treatment goals.        Elvia Mccall MD  March 13, 2018

## 2018-03-13 NOTE — MR AVS SNAPSHOT
[facility] 
 
 
 [address] [facility] [place]note 2[place] [address] [facility] [address] 
[phone] Patient: Robert Campoverde MRN: [id number] GRT:[id number][birth year]27/9431 Visit Information Date & Time Provider Department Dept. Phone Encounter #  
 3/13/2018  8:00 AM Maricel Hernandez [address] [phone] [id number] Follow-up Instructions Return in about 3 months (around 6/13/2018) for rov. Upcoming Health Maintenance Date Due DTaP/Tdap/Td series (1 - Tdap) 9/15/1956 ZOSTER VACCINE AGE 60> 7/15/1995 GLAUCOMA SCREENING Q2Y 9/15/2000 Pneumococcal 65+ Low/Medium Risk (2 of 2 - PPSV23) 1/9/2019 MEDICARE YEARLY EXAM 1/10/2019 Allergies as of 3/13/2018  Review Complete On: 3/13/2018 By: Israel Pereira LPN No Known Allergies Current Immunizations  Never Reviewed Name Date Influenza High Dose Vaccine PF 1/9/2018  9:55 AM  
 Pneumococcal Conjugate (PCV-13) 1/9/2018  9:55 AM  
  
 Not reviewed this visit You Were Diagnosed With   
  
 Codes Comments Pure hypercholesterolemia    -  Primary ICD-10-CM: E78.00 ICD-9-CM: 272.0 Osteopenia, senile     ICD-10-CM: M85.80 ICD-9-CM: 733.90 Urinary, incontinence, stress female     ICD-10-CM: N39.3 ICD-9-CM: 625.6 Esophageal dysphagia     ICD-10-CM: R13.10 ICD-9-CM: 787.20 Vitals BP Pulse Temp Resp Height(growth percentile) Weight(growth percentile) 129/71 69 95.9 °F (35.5 °C) (Oral) 20 5' 4\" (1.626 m) 188 lb 3.2 oz (85.4 kg) SpO2 BMI OB Status Smoking Status 100% 32.3 kg/m2 Postmenopausal Never Smoker BMI and BSA Data Body Mass Index Body Surface Area  
 32.3 kg/m 2 1.96 m 2 Preferred Pharmacy Pharmacy Name Phone [address], [address] [facility] [phone] Your Updated Medication List  
  
   
This list is accurate as of 3/13/18  9:02 AM.  Always use your most recent med list.  
  
  
  
  
 calcium 500 mg Tab Take  by mouth. MULTIVITAMIN PO Take  by mouth. oxybutynin 5 mg tablet Commonly known as:  JPGHDQUW Take 1 Tab by mouth three (3) times daily. pantoprazole 40 mg tablet Commonly known as:  PROTONIX Take 1 Tab by mouth daily. 30 minutes before breakfast  
  
 VITAMIN D3 1,000 unit tablet Generic drug:  cholecalciferol Take  by mouth daily. Prescriptions Sent to Pharmacy Refills  
 oxybutynin (DITROPAN) 5 mg tablet 5 Sig: Take 1 Tab by mouth three (3) times daily. Class: Normal  
 Pharmacy: [address] [place] [address] Ph #: [phone] Route: Oral  
 pantoprazole (PROTONIX) 40 mg tablet 2 Sig: Take 1 Tab by mouth daily. 30 minutes before breakfast  
 Class: Normal  
 Pharmacy: [address] [place] [address] Ph #: [phone] Route: Oral  
  
Follow-up Instructions Return in about 3 months (around 6/13/2018) for rov. To-Do List   
 03/13/2018 Imaging:  XR UPPER GI W KUB AIR CONT   
  
 09/18/2018 7:30 AM  
  Appointment with [address] at [address] [facility] ([phone]) EXAM INSTRUCTIONS -Remove deodorant, powder and/or perfume prior to exam.  GENERAL INSTRUCTIONS -If you have a hand-written prescription for this exam, you must bring it with you to your appointment. -You may be responsible for any co-payments and deductibles as indicated by your insurance carrier. -Only patients will be allowed in the exam room, including children. -Children under the age of 15 may not be left unattended. -Bring all relevant prior images from facilities outside of [facility]. Failure to provide images may delay reading by Radiologist. -[address] [facility] patients must have an armband and be accompanied by a caregiver or family member.   APPOINTMENT CANCELLATION To reschedule or cancel an appointment, please contact the Scheduling Department at [phone]. Introducing [facility]! Ousmane Rafia introduces [employer] patient portal. Now you can access parts of your medical record, email your doctor's office, and request medication refills online. 1. In your internet browser, go to [email or web address] 2. Click on the First Time User? Click Here link in the Sign In box. You will see the New Member Sign Up page. 3. Enter your [employer] Access Code exactly as it appears below. You will not need to use this code after youve completed the sign-up process. If you do not sign up before the expiration date, you must request a new code. · [employer] Access Code: [email or web address] Expires: 6/11/2018  8:41 AM 
 
4. Enter the last four digits of your Social Security Number (xxxx) and Date of Birth (mm/dd/yyyy) as indicated and click Submit. You will be taken to the next sign-up page. 5. Create a [employer] ID. This will be your [employer] login ID and cannot be changed, so think of one that is secure and easy to remember. 6. Create a [employer] password. You can change your password at any time. 7. Enter your Password Reset Question and Answer. This can be used at a later time if you forget your password. 8. Enter your e-mail address. You will receive e-mail notification when new information is available in [address] 9. Click Sign Up. You can now view and download portions of your medical record. 10. Click the Download Summary menu link to download a portable copy of your medical information. If you have questions, please visit the Frequently Asked Questions section of the [employer] website. Remember, [employer] is NOT to be used for urgent needs. For medical emergencies, dial 911. Now available from your iPhone and Android! Please provide this summary of care documentation to your next provider. Your primary care clinician is listed as Kylah Rios.  If you have any questions after today's visit, please call [phone].

## 2018-03-13 NOTE — PROGRESS NOTES
1. Have you been to the ER, urgent care clinic since your last visit? Hospitalized since your last visit? No    2. Have you seen or consulted any other health care providers outside of the [address] [place] [address] since your last visit? Include any pap smears or colon screening.  No

## 2018-03-16 ENCOUNTER — HOSPITAL ENCOUNTER (OUTPATIENT)
Dept: GENERAL RADIOLOGY | Age: 83
Discharge: HOME | End: 2018-03-16
Attending: INTERNAL MEDICINE
Payer: MEDICARE

## 2018-03-16 DIAGNOSIS — R13.19 ESOPHAGEAL DYSPHAGIA: ICD-10-CM

## 2018-03-16 PROCEDURE — 74011000255 HC RX REV CODE- 255: Performed by: INTERNAL MEDICINE

## 2018-03-16 PROCEDURE — 74247 XR UPPER GI W KUB AIR CONT: CPT

## 2018-03-16 PROCEDURE — 74011000250 HC RX REV CODE- 250: Performed by: INTERNAL MEDICINE

## 2018-03-16 RX ADMIN — BARIUM SULFATE 135 ML: 980 POWDER, FOR SUSPENSION ORAL at 09:33

## 2018-03-16 RX ADMIN — ANTACID/ANTIFLATULENT 4 G: 380; 550; 10; 10 GRANULE, EFFERVESCENT ORAL at 08:45

## 2018-03-16 RX ADMIN — BARIUM SULFATE 176 G: 960 POWDER, FOR SUSPENSION ORAL at 09:33

## 2018-03-16 NOTE — PROGRESS NOTES
Barium swallow result noted. Will discuss with patient on follow-up. Consider referral to GI for EGD.

## 2018-03-20 ENCOUNTER — TELEPHONE (OUTPATIENT)
Dept: FAMILY MEDICINE CLINIC | Age: 83
End: 2018-03-20

## 2018-03-20 DIAGNOSIS — R13.14 PHARYNGOESOPHAGEAL DYSPHAGIA: Primary | ICD-10-CM

## 2018-03-20 NOTE — TELEPHONE ENCOUNTER
Discussed with patient the results of barium swallow which showed:    Biphasic evaluation of the thoracic esophagus demonstrates diffuse abnormal  primary and secondary peristalsis with reversal incomplete clearing. Extensive  tertiary contractions are present. The thoracic esophagus has a prominent  configuration throughout the study. There is a small smooth defect in the  posterior lower thoracic esophagus, present on multiple images, with one image  demonstrating a somewhat shelflike configuration. Small hiatus hernia is present. Gastroesophageal reflux is present.     The stomach and duodenal bulb show normal distensibility without evidence of  ulcer or inflammatory bowel disease. The proximal small bowel is normal.     Plan:  Referral to GI for EGD  Patient understood and agreed with plan.

## 2018-06-12 ENCOUNTER — OFFICE VISIT (OUTPATIENT)
Dept: FAMILY MEDICINE CLINIC | Age: 83
End: 2018-06-12

## 2018-06-12 VITALS
SYSTOLIC BLOOD PRESSURE: 133 MMHG | BODY MASS INDEX: 32.27 KG/M2 | RESPIRATION RATE: 20 BRPM | DIASTOLIC BLOOD PRESSURE: 77 MMHG | HEIGHT: 64 IN | WEIGHT: 189 LBS | TEMPERATURE: 98.2 F | HEART RATE: 70 BPM | OXYGEN SATURATION: 97 %

## 2018-06-12 DIAGNOSIS — M85.80 OSTEOPENIA, SENILE: ICD-10-CM

## 2018-06-12 DIAGNOSIS — E78.00 PURE HYPERCHOLESTEROLEMIA: Primary | ICD-10-CM

## 2018-06-12 DIAGNOSIS — R13.19 ESOPHAGEAL DYSPHAGIA: ICD-10-CM

## 2018-06-12 DIAGNOSIS — R73.9 HYPERGLYCEMIA: ICD-10-CM

## 2018-06-12 DIAGNOSIS — N39.3 URINARY, INCONTINENCE, STRESS FEMALE: ICD-10-CM

## 2018-06-12 LAB
BILIRUB UR QL STRIP: NEGATIVE
GLUCOSE UR-MCNC: NEGATIVE MG/DL
KETONES P FAST UR STRIP-MCNC: NEGATIVE MG/DL
PH UR STRIP: 7 [PH] (ref 4.6–8)
PROT UR QL STRIP: NEGATIVE
SP GR UR STRIP: 1.01 (ref 1–1.03)
UA UROBILINOGEN AMB POC: NORMAL (ref 0.2–1)
URINALYSIS CLARITY POC: CLEAR
URINALYSIS COLOR POC: YELLOW
URINE BLOOD POC: NEGATIVE
URINE LEUKOCYTES POC: NEGATIVE
URINE NITRITES POC: NEGATIVE

## 2018-06-12 RX ORDER — PANTOPRAZOLE SODIUM 40 MG/1
40 TABLET, DELAYED RELEASE ORAL DAILY
Qty: 30 TAB | Refills: 2 | Status: SHIPPED | OUTPATIENT
Start: 2018-06-12 | End: 2018-08-02 | Stop reason: CLARIF

## 2018-06-12 NOTE — PROGRESS NOTES
1. Have you been to the ER, urgent care clinic since your last visit? Hospitalized since your last visit? No    2. Have you seen or consulted any other health care providers outside of the [facility] since your last visit? Include any pap smears or colon screening.  No

## 2018-06-12 NOTE — PROGRESS NOTES
History of Present Illness  Edward Gross is a 80 y.o. female who presents today for management of    Chief Complaint   Patient presents with    Cholesterol Problem       Cardiovascular Review:  The patient has hyperlipidemia, diet-controlled  Diet and Lifestyle: generally follows a low fat low cholesterol diet, generally follows a low sodium diet, exercises sporadically  Home BP Monitoring: is not measured at home. Pertinent ROS: no TIA's, no chest pain on exertion, no dyspnea on exertion, no swelling of ankles. Urinary Incontinence  She  is an 80 y.o. female seen for  incontinence problems. Symptoms were first noted several months ago. Symptoms include: urge to urinate comes with little or no warning and unpredictable urine leakage. She usually wears heavy pads for protection. Previous treatment includes: oral medication. She denies hematuria, dysuria, vaginal burning and rectal incontinence. She had good results after starting oxybutynin.     Dysphagia  Patient complains of intermittent sensation of food being stuck in her throat. It occurs with solid food, but not with liquids. She admits to not chewing her food well. She also complains of neck tenderness. She is not sure if she has heartburn. No nausea, vomiting, weight loss, dark stools. Barium swallow showed a filling defect. No findings of significant stricture or additional abnormalities. She had never had an endoscopy.  She has an appointment with Dr. Anmol Perez next week.       Problem List  Patient Active Problem List    Diagnosis Date Noted    Pure hypercholesterolemia 01/09/2018    Osteopenia, senile 01/09/2018    Urinary, incontinence, stress female 12/12/2017    Advance care planning 12/12/2017       Past Medical History  Past Medical History:   Diagnosis Date    Osteopenia     Urinary incontinence         Surgical History  Past Surgical History:   Procedure Laterality Date    HX ANKLE FRACTURE TX      HX TUBAL LIGATION      25 years ago Current Medications  Current Outpatient Prescriptions   Medication Sig    oxybutynin (DITROPAN) 5 mg tablet Take 1 Tab by mouth three (3) times daily.  pantoprazole (PROTONIX) 40 mg tablet Take 1 Tab by mouth daily. 30 minutes before breakfast    cholecalciferol (VITAMIN D3) 1,000 unit tablet Take  by mouth daily.  calcium 500 mg Tab Take  by mouth.  MULTIVITAMIN PO Take  by mouth. No current facility-administered medications for this visit. Allergies/Drug Reactions  No Known Allergies     Family History  Family History   Problem Relation Age of Onset    Hypertension Father         Social History  Social History     Social History    Marital status:      Spouse name: N/A    Number of children: N/A    Years of education: N/A     Occupational History    Not on file.      Social History Main Topics    Smoking status: Never Smoker    Smokeless tobacco: Never Used    Alcohol use No      Comment: occ    Drug use: No    Sexual activity: No     Other Topics Concern    Not on file     Social History Narrative       Review of Systems  General ROS: negative for - chills, fatigue or fever  Psychological ROS: negative  Ophthalmic ROS: positive for - uses glasses  ENT ROS: negative  Respiratory ROS: no cough, shortness of breath, or wheezing  Cardiovascular ROS: no chest pain or dyspnea on exertion  Gastrointestinal ROS: no abdominal pain, change in bowel habits, or black or bloody stools  positive for - gas/bloating and dysphagia  Genito-Urinary ROS: positive for - urinary frequency/urgency  negative for - change in urinary stream or dysuria  Musculoskeletal ROS: positive for - pain in back - lower  Neurological ROS: negative      Physical Exam  Vital signs:   Vitals:    06/12/18 0806   BP: 133/77   Pulse: 70   Resp: 20   Temp: 98.2 °F (36.8 °C)   TempSrc: Oral   SpO2: 97%   Weight: 189 lb (85.7 kg)   Height: 5' 4\" (1.626 m)       General: alert, oriented, not in distress  Chest/Lungs: clear breath sounds, no wheezing or crackles  Heart: normal rate, regular rhythm, no murmur  Abdomen: soft, non-distended, non-tender, normal bowel sounds, no organomegaly, no masses  Extremities: no focal deformities, no edema    Laboratory/Tests:  XR BARIUM SWALLOW ESOPHAGRAM  1. Nonspecific appearance in the posterior distal thoracic esophagus of a single smooth filling defect, present on several images, with one image demonstrating a somewhat shelflike configuration. This filling defect is present in the absence of tertiary contractions. No findings of significant stricture or additional abnormalities. Recommend direct visualization with endoscopy to differentiate mucosal lesion from atypical extrinsic process. 2. Small hiatus hernia, gastroesophageal reflux is altered esophageal  dysmotility, with tertiary contractions. Component      Latest Ref Rng & Units 12/12/2017 12/12/2017 12/12/2017           9:36 AM  9:36 AM  9:36 AM   WBC      4.6 - 13.2 K/uL  6.8    RBC      4.20 - 5.30 M/uL  4.80    HGB      12.0 - 16.0 g/dL  13.1    HCT      35.0 - 45.0 %  42.2    MCV      74.0 - 97.0 FL  87.9    MCH      24.0 - 34.0 PG  27.3    MCHC      31.0 - 37.0 g/dL  31.0    RDW      11.6 - 14.5 %  13.5    PLATELET      916 - 575 K/uL  211    MPV      9.2 - 11.8 FL  10.0    NEUTROPHILS      40 - 73 %  66    LYMPHOCYTES      21 - 52 %  25    MONOCYTES      3 - 10 %  8    EOSINOPHILS      0 - 5 %  1    BASOPHILS      0 - 2 %  0    ABS. NEUTROPHILS      1.8 - 8.0 K/UL  4.5    ABS. LYMPHOCYTES      0.9 - 3.6 K/UL  1.7    ABS. MONOCYTES      0.05 - 1.2 K/UL  0.6    ABS. EOSINOPHILS      0.0 - 0.4 K/UL  0.1    ABS.  BASOPHILS      0.0 - 0.06 K/UL  0.0    DF        AUTOMATED    Cholesterol, total      <200 MG/DL   219 (H)   Triglyceride      <150 MG/DL   57   HDL Cholesterol      40 - 60 MG/DL   94 (H)   LDL, calculated      0 - 100 MG/DL   113.6 (H)   VLDL, calculated      MG/DL   11.4   CHOL/HDL Ratio 0 - 5.0     2.3   HCV Ab      0.0 - 0.9 s/co ratio 0.2       Component      Latest Ref Rng & Units 12/12/2017           9:36 AM   Sodium      136 - 145 mmol/L    Potassium      3.5 - 5.5 mmol/L    Chloride      100 - 108 mmol/L    CO2      21 - 32 mmol/L    Anion gap      3.0 - 18 mmol/L    Glucose      74 - 99 mg/dL    BUN      7.0 - 18 MG/DL    Creatinine      0.6 - 1.3 MG/DL    BUN/Creatinine ratio      12 - 20      GFR est AA      >60 ml/min/1.73m2    GFR est non-AA      >60 ml/min/1.73m2    Calcium      8.5 - 10.1 MG/DL    Bilirubin, total      0.2 - 1.0 MG/DL    ALT (SGPT)      13 - 56 U/L    AST      15 - 37 U/L    Alk. phosphatase      45 - 117 U/L    Protein, total      6.4 - 8.2 g/dL    Albumin      3.4 - 5.0 g/dL    Globulin      2.0 - 4.0 g/dL    A-G Ratio      0.8 - 1.7      Color          Appearance          Specific gravity      1.003 - 1.030      pH (UA)      5.0 - 8.0      Protein      NEG mg/dL    Glucose      NEG mg/dL    Ketone      NEG mg/dL    Bilirubin      NEG      Blood      NEG      Urobilinogen      0.2 - 1.0 EU/dL    Nitrites      NEG      Leukocyte Esterase      NEG      Special Requests:       NO SPECIAL REQUESTS   Culture result:       <10,000 COLONIES/mL PROTEUS SPECIES (A)     Component      Latest Ref Rng & Units 12/12/2017 12/12/2017           9:36 AM  9:36 AM   Sodium      136 - 145 mmol/L 142    Potassium      3.5 - 5.5 mmol/L 4.2    Chloride      100 - 108 mmol/L 103    CO2      21 - 32 mmol/L 33 (H)    Anion gap      3.0 - 18 mmol/L 6    Glucose      74 - 99 mg/dL 122 (H)    BUN      7.0 - 18 MG/DL 13    Creatinine      0.6 - 1.3 MG/DL 0.56 (L)    BUN/Creatinine ratio      12 - 20   23 (H)    GFR est AA      >60 ml/min/1.73m2 >60    GFR est non-AA      >60 ml/min/1.73m2 >60    Calcium      8.5 - 10.1 MG/DL 9.1    Bilirubin, total      0.2 - 1.0 MG/DL 0.8    ALT (SGPT)      13 - 56 U/L 24    AST      15 - 37 U/L 23    Alk.  phosphatase      45 - 117 U/L 84    Protein, total 6.4 - 8.2 g/dL 7.4    Albumin      3.4 - 5.0 g/dL 3.8    Globulin      2.0 - 4.0 g/dL 3.6    A-G Ratio      0.8 - 1.7   1.1    Color        YELLOW   Appearance        CLEAR   Specific gravity      1.003 - 1.030    1.020   pH (UA)      5.0 - 8.0    7.0   Protein      NEG mg/dL  NEGATIVE   Glucose      NEG mg/dL  NEGATIVE   Ketone      NEG mg/dL  NEGATIVE   Bilirubin      NEG    NEGATIVE   Blood      NEG    NEGATIVE   Urobilinogen      0.2 - 1.0 EU/dL  0.2   Nitrites      NEG    NEGATIVE   Leukocyte Esterase      NEG    NEGATIVE   Special Requests:           Culture result:             Assessment/Plan:      1. Pure hypercholesterolemia  - diet-controlled  - low fat diet     2. Osteopenia, senile  - continue calcium with vitamin D  - continue exercises     3. Urinary, incontinence, stress female  - improved  - continue oxybutynin (DITROPAN) 5 mg tablet; Take 1 Tab by mouth three (3) times daily. Dispense: 90 Tab; Refill: 5     4. Esophageal dysphagia  - Barium swallow showed filling defect  - GI appointment next week, she needs an EGD  - continue pantoprazole (PROTONIX) 40 mg tablet; Take 1 Tab by mouth daily. 30 minutes before breakfast  Dispense: 30 Tab; Refill: 2     Total visit time was 25 minutes of which more than 50% was devoted to counseling and coordination of care. Follow-up Disposition:  Return in about 3 months (around 9/12/2018) for rov. I have discussed the diagnosis with the patient and the intended plan as seen in the above orders. The patient has received an after-visit summary and questions were answered concerning future plans. I have discussed medication side effects and warnings with the patient as well. I have reviewed the plan of care with the patient, accepted their input and they are in agreement with the treatment goals.        Darlene Morocho MD  June 12, 2018

## 2018-06-12 NOTE — MR AVS SNAPSHOT
[facility] 
 
 
 [address] [facility] [address] [facility] [address] [address] [phone] Patient: Latoya Elam MRN: [id number] [id number] Visit Information Date & Time Provider Department Dept. Phone Encounter #  
 6/12/2018  7:30 AM Tess Wilsongood Gonzalezmeg [id number] Follow-up Instructions Return in about 3 months (around 9/12/2018) for rov. Upcoming Health Maintenance Date Due DTaP/Tdap/Td series (1 - Tdap) 9/15/1956 ZOSTER VACCINE AGE 60> 7/15/1995 GLAUCOMA SCREENING Q2Y 9/15/2000 Influenza Age 5 to Adult 8/1/2018 Pneumococcal 65+ Low/Medium Risk (2 of 2 - PPSV23) 1/9/2019 MEDICARE YEARLY EXAM 1/10/2019 Allergies as of 6/12/2018  Review Complete On: 6/12/2018 By: Remi Brewer MD  
 No Known Allergies Current Immunizations  Never Reviewed Name Date Influenza High Dose Vaccine PF 1/9/2018  9:55 AM  
 Pneumococcal Conjugate (PCV-13) 1/9/2018  9:55 AM  
  
 Not reviewed this visit You Were Diagnosed With   
  
 Codes Comments Pure hypercholesterolemia    -  Primary ICD-10-CM: E78.00 ICD-9-CM: 272.0 Urinary, incontinence, stress female     ICD-10-CM: N39.3 ICD-9-CM: 625.6 Osteopenia, senile     ICD-10-CM: M85.80 ICD-9-CM: 733.90 Hyperglycemia     ICD-10-CM: R73.9 ICD-9-CM: 790.29 Vitals BP Pulse Temp Resp Height(growth percentile) Weight(growth percentile) 133/77 70 98.2 °F (36.8 °C) (Oral) 20 5' 4\" (1.626 m) 189 lb (85.7 kg) SpO2 BMI OB Status Smoking Status 97% 32.44 kg/m2 Postmenopausal Never Smoker BMI and BSA Data Body Mass Index Body Surface Area  
 32.44 kg/m 2 1.97 m 2 Preferred Pharmacy Pharmacy Name Phone [address], [address] [facility] [phone] Your Updated Medication List  
  
   
This list is accurate as of 6/12/18  8:20 AM.  Always use your most recent med list.  
  
 calcium 500 mg Tab Take  by mouth. MULTIVITAMIN PO Take  by mouth. oxybutynin 5 mg tablet Commonly known as:  VOGAYHAH Take 1 Tab by mouth three (3) times daily. pantoprazole 40 mg tablet Commonly known as:  PROTONIX Take 1 Tab by mouth daily. 30 minutes before breakfast  
  
 VITAMIN D3 1,000 unit tablet Generic drug:  cholecalciferol Take  by mouth daily. We Performed the Following AMB POC URINALYSIS DIP STICK AUTO W/O MICRO [39646 CPT(R)] Follow-up Instructions Return in about 3 months (around 9/12/2018) for rov. To-Do List   
 09/18/2018 7:30 AM  
  Appointment with [address] at [address] ([phone]) EXAM INSTRUCTIONS -Remove deodorant, powder and/or perfume prior to exam.  GENERAL INSTRUCTIONS -If you have a hand-written prescription for this exam, you must bring it with you to your appointment. -You may be responsible for any co-payments and deductibles as indicated by your insurance carrier. -Only patients will be allowed in the exam room, including children. -Children under the age of 15 may not be left unattended. -Bring all relevant prior images from facilities outside of [facility]. Failure to provide images may delay reading by Radiologist. -[facility] patients must have an armband and be accompanied by a caregiver or family member. APPOINTMENT CANCELLATION To reschedule or cancel an appointment, please contact the Scheduling Department at [phone]. Introducing [facility]! [facility] introduces [employer] patient portal. Now you can access parts of your medical record, email your doctor's office, and request medication refills online. 1. In your internet browser, go to [email or web address] 2. Click on the First Time User? Click Here link in the Sign In box. You will see the New Member Sign Up page. 3. Enter your [employer] Access Code exactly as it appears below. You will not need to use this code after youve completed the sign-up process. If you do not sign up before the expiration date, you must request a new code. · [employer] Access Code: [email or web address] Expires: 9/10/2018  7:54 AM 
 
4. Enter the last four digits of your Social Security Number (xxxx) and Date of Birth (mm/dd/yyyy) as indicated and click Submit. You will be taken to the next sign-up page. 5. Create a [employer] ID. This will be your [employer] login ID and cannot be changed, so think of one that is secure and easy to remember. 6. Create a [employer] password. You can change your password at any time. 7. Enter your Password Reset Question and Answer. This can be used at a later time if you forget your password. 8. Enter your e-mail address. You will receive e-mail notification when new information is available in [address] 9. Click Sign Up. You can now view and download portions of your medical record. 10. Click the Download Summary menu link to download a portable copy of your medical information. If you have questions, please visit the Frequently Asked Questions section of the [employer] website. Remember, [employer] is NOT to be used for urgent needs. For medical emergencies, dial 911. Now available from your iPhone and Android! Please provide this summary of care documentation to your next provider. Your primary care clinician is listed as Cele Medina. If you have any questions after today's visit, please call [phone].

## 2018-08-06 PROBLEM — R13.10 DYSPHAGIA: Status: ACTIVE | Noted: 2018-08-06

## 2018-10-02 ENCOUNTER — OFFICE VISIT (OUTPATIENT)
Dept: FAMILY MEDICINE CLINIC | Age: 83
End: 2018-10-02

## 2018-10-02 VITALS
TEMPERATURE: 97.3 F | HEIGHT: 64 IN | DIASTOLIC BLOOD PRESSURE: 80 MMHG | RESPIRATION RATE: 20 BRPM | OXYGEN SATURATION: 97 % | BODY MASS INDEX: 32.47 KG/M2 | SYSTOLIC BLOOD PRESSURE: 124 MMHG | HEART RATE: 72 BPM | WEIGHT: 190.2 LBS

## 2018-10-02 DIAGNOSIS — N39.3 URINARY, INCONTINENCE, STRESS FEMALE: ICD-10-CM

## 2018-10-02 DIAGNOSIS — E78.00 PURE HYPERCHOLESTEROLEMIA: Primary | ICD-10-CM

## 2018-10-02 DIAGNOSIS — R06.09 DYSPNEA ON EXERTION: ICD-10-CM

## 2018-10-02 DIAGNOSIS — M85.80 OSTEOPENIA, SENILE: ICD-10-CM

## 2018-10-02 PROBLEM — R13.10 DYSPHAGIA: Status: RESOLVED | Noted: 2018-08-06 | Resolved: 2018-10-02

## 2018-10-02 RX ORDER — OXYBUTYNIN CHLORIDE 5 MG/1
5 TABLET ORAL 3 TIMES DAILY
Qty: 90 TAB | Refills: 5 | Status: SHIPPED | OUTPATIENT
Start: 2018-10-02 | End: 2019-06-24 | Stop reason: SDDI

## 2018-10-02 RX ORDER — PANTOPRAZOLE SODIUM 40 MG/1
TABLET, DELAYED RELEASE ORAL
COMMUNITY
Start: 2018-08-06 | End: 2018-12-24

## 2018-10-02 NOTE — PROGRESS NOTES
History of Present Illness Juan Scott is a 80 y.o. female who presents today for management of 
 
Chief Complaint Patient presents with  Cholesterol Problem Patient reports of having dyspnea on exertion for about a year. It has not changed since onset. Cardiovascular Review: 
The patient has hyperlipidemia. Diet and Lifestyle: generally follows a low fat low cholesterol diet, generally follows a low sodium diet, exercises regularly Home BP Monitoring: is not measured at home. Pertinent ROS: no TIA's, no chest pain on exertion, notes stable Urinary Incontinence She reports of improved urinary symptoms: frequency and urgency. She stopped taking oxybutynin. 
   
Dysphagia Patient reports of improved symptoms. She had an EGD with dilatation by Dr. Reinaldo Felix. Problem List 
Patient Active Problem List  
 Diagnosis Date Noted  Pure hypercholesterolemia 01/09/2018  Osteopenia, senile 01/09/2018  Urinary, incontinence, stress female 12/12/2017  Advance care planning 12/12/2017 Past Medical History Past Medical History:  
Diagnosis Date  Indigestion  Osteopenia  Urinary incontinence Surgical History Past Surgical History:  
Procedure Laterality Date  HX ANKLE FRACTURE TX    
 HX TUBAL LIGATION    
 25 years ago Current Medications Current Outpatient Prescriptions Medication Sig  pantoprazole (PROTONIX) 40 mg tablet  cholecalciferol (VITAMIN D3) 1,000 unit tablet Take  by mouth daily.  calcium 500 mg Tab Take  by mouth daily. No current facility-administered medications for this visit. Allergies/Drug Reactions No Known Allergies Family History Family History Problem Relation Age of Onset  Hypertension Father Social History Social History Social History  Marital status:  Spouse name: N/A  
 Number of children: N/A  
 Years of education: N/A Occupational History  Not on file. Social History Main Topics  Smoking status: Never Smoker  Smokeless tobacco: Never Used  Alcohol use 0.6 oz/week 1 Glasses of wine per week Comment: occasional glass of  wine  Drug use: No  
 Sexual activity: No  
 
Other Topics Concern  Not on file Social History Narrative Review of Systems Negative except as mentioned in HPI Physical Exam 
Vital signs:  
Vitals:  
 10/02/18 0740 BP: 124/80 Pulse: 72 Resp: 20 Temp: 97.3 °F (36.3 °C) TempSrc: Oral  
SpO2: 97% Weight: 190 lb 3.2 oz (86.3 kg) Height: 5' 4\" (1.626 m) General: alert, oriented, not in distress Eyes: clear conjunctivae, anicteric sclerae, full and equal ROMs Chest/Lungs: clear breath sounds, no wheezing or crackles Heart: normal rate, regular rhythm, no murmur Abdomen: soft, non-distended, non-tender, normal bowel sounds, no organomegaly, no masses Extremities: no focal deformities, no edema Neuro: AAOx3, CN's grossly intact Skin: no visible abnormalities Laboratory/Tests: 
Component Latest Ref Rng & Units 12/12/2017 12/12/2017 12/12/2017 12/12/2017  
 
      9:36 AM  9:36 AM  9:36 AM  9:36 AM  
WBC 
    4.6 - 13.2 K/uL 6.8     
RBC 
    4.20 - 5.30 M/uL 4.80 HGB 12.0 - 16.0 g/dL 13.1 HCT 
    35.0 - 45.0 % 42.2 MCV 
    74.0 - 97.0 FL 87.9 MCH 
    24.0 - 34.0 PG 27.3 MCHC 31.0 - 37.0 g/dL 31.0     
RDW 
    11.6 - 14.5 % 13.5 PLATELET 
    724 - 984 K/uL 211 MPV 
    9.2 - 11.8 FL 10.0 NEUTROPHILS 
    40 - 73 % 66 LYMPHOCYTES 
    21 - 52 % 25 MONOCYTES 
    3 - 10 % 8 EOSINOPHILS 
    0 - 5 % 1     
BASOPHILS 
    0 - 2 % 0     
ABS. NEUTROPHILS 
    1.8 - 8.0 K/UL 4.5     
ABS. LYMPHOCYTES 
    0.9 - 3.6 K/UL 1.7     
ABS. MONOCYTES 
    0.05 - 1.2 K/UL 0.6     
ABS. EOSINOPHILS 
    0.0 - 0.4 K/UL 0.1 ABS. BASOPHILS 
    0.0 - 0.06 K/UL 0.0     
DF AUTOMATED Sodium 136 - 145 mmol/L  142 Potassium 3.5 - 5.5 mmol/L  4.2 Chloride 100 - 108 mmol/L  103 CO2 
    21 - 32 mmol/L  33 (H) Anion gap 3.0 - 18 mmol/L  6 Glucose 74 - 99 mg/dL  122 (H) BUN 
    7.0 - 18 MG/DL  13 Creatinine 
    0.6 - 1.3 MG/DL  0.56 (L) BUN/Creatinine ratio 12 - 20    23 (H) GFR est AA 
    >60 ml/min/1.73m2  >60    
GFR est non-AA 
    >60 ml/min/1.73m2  >60 Calcium 8.5 - 10.1 MG/DL  9.1 Bilirubin, total 
    0.2 - 1.0 MG/DL  0.8 ALT (SGPT) 13 - 56 U/L  24 AST 
    15 - 37 U/L  23 Alk. phosphatase 45 - 117 U/L  84 Protein, total 
    6.4 - 8.2 g/dL  7.4 Albumin 3.4 - 5.0 g/dL  3.8 Globulin 2.0 - 4.0 g/dL  3.6 A-G Ratio 
    0.8 - 1.7    1.1 Color YELLOW Appearance CLEAR Specific gravity 1.003 - 1.030      1.020  
pH (UA) 
    5.0 - 8.0      7.0 Protein NEG mg/dL    NEGATIVE Glucose NEG mg/dL    NEGATIVE Ketone NEG mg/dL    NEGATIVE Bilirubin NEG      NEGATIVE Blood NEG      NEGATIVE Urobilinogen 0.2 - 1.0 EU/dL    0.2 Nitrites NEG      NEGATIVE Leukocyte Esterase NEG      NEGATIVE Cholesterol, total 
    <200 MG/DL   219 (H) Triglyceride 
    <150 MG/DL   57 HDL Cholesterol 40 - 60 MG/DL   94 (H) LDL, calculated 0 - 100 MG/DL   113.6 (H) VLDL, calculated MG/DL   11.4 CHOL/HDL Ratio 0 - 5.0     2.3 Component Latest Ref Rng & Units 6/12/2018  
 
      9:05 AM  
Color (UA POC) Yellow Clarity (UA POC) Clear Glucose (UA POC) Negative Negative Bilirubin (UA POC) Negative Negative Ketones (UA POC) Negative Negative Specific gravity (UA POC) 1.001 - 1.035 1.015 Blood (UA POC) Negative Negative pH (UA POC) 4.6 - 8.0 7.0 Protein (UA POC) Negative Negative Urobilinogen (UA POC) 0.2 - 1 0.2 mg/dL Nitrites (UA POC) Negative Negative Leukocyte esterase (UA POC) Negative Negative Special Requests: 
       
Culture result: 
       
 
Component Latest Ref Rng & Units 12/12/2017  
 
      9:36 AM  
Color (UA POC) Clarity (UA POC) Glucose (UA POC) Negative Bilirubin (UA POC) Negative Ketones (UA POC) Negative Specific gravity (UA POC) 1.001 - 1.035 Blood (UA POC) Negative pH (UA POC) 4.6 - 8.0 Protein (UA POC) Negative Urobilinogen (UA POC) 
    0.2 - 1 Nitrites (UA POC) Negative Leukocyte esterase (UA POC) Negative Special Requests: 
     NO SPECIAL REQUESTS Culture result: 
     <10,000 COLONIES/mL PROTEUS SPECIES (A) Assessment/Plan: 1. Pure hypercholesterolemia 
- diet-controlled 
- repeat lipid panel in 3 months 2. Osteopenia, senile 
- continue calcium and vitamin D 
- next DEXA scan due 1/2019 3. Urinary, incontinence, stress female 
- restart oxybutynin (DITROPAN) 5 mg tablet; Take 1 Tab by mouth three (3) times daily. Dispense: 90 Tab; Refill: 5 
 
4. Dyspnea on exertion 
- EKG, 12 LEAD, INITIAL; Future 
- ECHO ADULT COMPLETE; Future Follow-up Disposition: 
Return in about 6 months (around 4/9/2019) for [address] I have discussed the diagnosis with the patient and the intended plan as seen in the above orders. The patient has received an after-visit summary and questions were answered concerning future plans. I have discussed medication side effects and warnings with the patient as well. I have reviewed the plan of care with the patient, accepted their input and they are in agreement with the treatment goals. Ilya Schwab MD 
October 2, 2018

## 2018-10-02 NOTE — PROGRESS NOTES
1. Have you been to the ER, urgent care clinic since your last visit? Hospitalized since your last visit? No 
 
2. Have you seen or consulted any other health care providers outside of the [address] [place] [address] since your last visit? Include any pap smears or colon screening.  No

## 2018-10-02 NOTE — MR AVS SNAPSHOT
[address] [facility] [address] 
[phone] Patient: Wilver Daniel MRN: [id number] [facility]:0/75/7821 Visit Information Date & Time Provider Department Dept. Phone Encounter #  
 10/2/2018  7:30 AM Jessee Matthews, [address] Casillas [id number] Follow-up Instructions Return in about 6 months (around 4/9/2019) for [address] Follow-up and Disposition History Upcoming Health Maintenance Date Due DTaP/Tdap/Td series (1 - Tdap) 9/15/1956 Shingrix Vaccine Age 50> (1 of 2) 9/15/1985 GLAUCOMA SCREENING Q2Y 9/15/2000 Influenza Age 5 to Adult 8/1/2018 Pneumococcal 65+ Low/Medium Risk (2 of 2 - PPSV23) 1/9/2019 MEDICARE YEARLY EXAM 1/10/2019 Allergies as of 10/2/2018  Review Complete On: 10/2/2018 By: Marilin Mendoza LPN No Known Allergies Current Immunizations  Never Reviewed Name Date Influenza High Dose Vaccine PF 1/9/2018  9:55 AM  
 Pneumococcal Conjugate (PCV-13) 1/9/2018  9:55 AM  
  
 Not reviewed this visit You Were Diagnosed With   
  
 Codes Comments Pure hypercholesterolemia    -  Primary ICD-10-CM: E78.00 ICD-9-CM: 272.0 Osteopenia, senile     ICD-10-CM: M85.80 ICD-9-CM: 733.90 Urinary, incontinence, stress female     ICD-10-CM: N39.3 ICD-9-CM: 625.6 Dyspnea on exertion     ICD-10-CM: R06.09 
ICD-9-CM: 786.09 Vitals BP Pulse Temp Resp Height(growth percentile) Weight(growth percentile) 124/80 72 97.3 °F (36.3 °C) (Oral) 20 5' 4\" (1.626 m) 190 lb 3.2 oz (86.3 kg) SpO2 BMI OB Status Smoking Status 97% 32.65 kg/m2 Postmenopausal Never Smoker BMI and BSA Data Body Mass Index Body Surface Area  
 32.65 kg/m 2 1.97 m 2 Preferred Pharmacy Pharmacy Name Phone [address], [address] [facility] [phone] Your Updated Medication List  
  
   
 This list is accurate as of 10/2/18  7:56 AM.  Always use your most recent med list.  
  
  
  
  
 calcium 500 mg Tab Take  by mouth daily. oxybutynin 5 mg tablet Commonly known as:  ICEWMZSM Take 1 Tab by mouth three (3) times daily. pantoprazole 40 mg tablet Commonly known as:  PROTONIX  
  
 VITAMIN D3 1,000 unit tablet Generic drug:  cholecalciferol Take  by mouth daily. Prescriptions Sent to Pharmacy Refills  
 oxybutynin (DITROPAN) 5 mg tablet 5 Sig: Take 1 Tab by mouth three (3) times daily. Class: Normal  
 Pharmacy: [facility], [address] [facility] #: [phone] Route: Oral  
  
Follow-up Instructions Return in about 6 months (around 4/9/2019) for [address] To-Do List   
 10/02/2018 Echocardiography:  ECHO ADULT COMPLETE   
  
 10/02/2018 ECG:  EKG, 12 LEAD, INITIAL Introducing [facility]! [employer] introduces ChoreMonster patient portal. Now you can access parts of your medical record, email your doctor's office, and request medication refills online. 1. In your internet browser, go to [email or web address] 2. Click on the First Time User? Click Here link in the Sign In box. You will see the New Member Sign Up page. 3. Enter your ChoreMonster Access Code exactly as it appears below. You will not need to use this code after youve completed the sign-up process. If you do not sign up before the expiration date, you must request a new code. · ChoreMonster Access Code: [email or web address] Expires: 12/31/2018  7:56 AM 
 
4. Enter the last four digits of your Social Security Number (xxxx) and Date of Birth (mm/dd/yyyy) as indicated and click Submit. You will be taken to the next sign-up page. 5. Create a ChoreMonster ID. This will be your XM Radiot login ID and cannot be changed, so think of one that is secure and easy to remember. 6. Create a Affymax password. You can change your password at any time. 7. Enter your Password Reset Question and Answer. This can be used at a later time if you forget your password. 8. Enter your e-mail address. You will receive e-mail notification when new information is available in [address] 9. Click Sign Up. You can now view and download portions of your medical record. 10. Click the Download Summary menu link to download a portable copy of your medical information. If you have questions, please visit the Frequently Asked Questions section of the Affy[employer] website. Remember, Affymax is NOT to be used for urgent needs. For medical emergencies, dial 911. Now available from your iPhone and Android! Please provide this summary of care documentation to your next provider. Your primary care clinician is listed as Brittney Dow. If you have any questions after today's visit, please call [phone].

## 2018-10-12 ENCOUNTER — HOSPITAL ENCOUNTER (OUTPATIENT)
Dept: NON INVASIVE DIAGNOSTICS | Age: 83
Discharge: HOME | End: 2018-10-12
Attending: INTERNAL MEDICINE
Payer: MEDICARE

## 2018-10-12 VITALS — DIASTOLIC BLOOD PRESSURE: 62 MMHG | SYSTOLIC BLOOD PRESSURE: 132 MMHG

## 2018-10-12 DIAGNOSIS — R06.09 DYSPNEA ON EXERTION: ICD-10-CM

## 2018-10-12 LAB
ECHO AO ROOT DIAM: 3.52 CM
ECHO AV AREA PEAK VELOCITY: -4.7 CM2
ECHO AV AREA/BSA PEAK VELOCITY: -2.4 CM2/M2
ECHO AV PEAK GRADIENT: 1 MMHG
ECHO AV PEAK VELOCITY: -50.19 CM/S
ECHO LA VOL 2C: 96.21 ML (ref 22–52)
ECHO LA VOL 4C: 69.33 ML (ref 22–52)
ECHO LV EDV A2C: 116.8 ML
ECHO LV EDV A4C: 111.9 ML
ECHO LV EDV BP: 116.2 ML (ref 56–104)
ECHO LV EJECTION FRACTION A2C: 67 %
ECHO LV EJECTION FRACTION A4C: 59 %
ECHO LV EJECTION FRACTION BIPLANE: 64 % (ref 55–100)
ECHO LV ESV A2C: 38.1 ML
ECHO LV ESV A4C: 46.4 ML
ECHO LV ESV BP: 41.9 ML (ref 19–49)
ECHO LV INTERNAL DIMENSION DIASTOLIC: 5.03 CM (ref 3.9–5.3)
ECHO LV INTERNAL DIMENSION SYSTOLIC: 3.59 CM
ECHO LV IVSD: 0.96 CM (ref 0.6–0.9)
ECHO LV MASS 2D: 223.1 G (ref 67–162)
ECHO LV POSTERIOR WALL DIASTOLIC: 1.09 CM (ref 0.6–0.9)
ECHO LVOT DIAM: 2.05 CM
ECHO LVOT PEAK GRADIENT: 2.1 MMHG
ECHO LVOT PEAK VELOCITY: 72.18 CM/S
ECHO MV A VELOCITY: 85.76 CM/S
ECHO MV AREA PHT: 4.3 CM2
ECHO MV E DECELERATION TIME (DT): 176.3 MS
ECHO MV E VELOCITY: 0.54 CM/S
ECHO MV E/A RATIO: 0.01
ECHO MV PRESSURE HALF TIME (PHT): 51.1 MS
ECHO TV REGURGITANT MAX VELOCITY: -56.33 CM/S
ECHO TV REGURGITANT PEAK GRADIENT: 1.3 MMHG

## 2018-10-12 PROCEDURE — 93306 TTE W/DOPPLER COMPLETE: CPT

## 2018-10-24 ENCOUNTER — TELEPHONE (OUTPATIENT)
Dept: FAMILY MEDICINE CLINIC | Age: 83
End: 2018-10-24

## 2018-10-25 NOTE — TELEPHONE ENCOUNTER
Please inform patient of echo results: 
Normal ejection fraction which means heart is pumping blood normally. No evidence of systolic heart failure.

## 2018-10-26 NOTE — TELEPHONE ENCOUNTER
Nurse Ang Officer spoke to patient, advised patient of message below. This encounter will be closed.

## 2018-11-08 ENCOUNTER — OFFICE VISIT (OUTPATIENT)
Dept: FAMILY MEDICINE CLINIC | Age: 83
End: 2018-11-08

## 2018-11-08 VITALS
HEART RATE: 67 BPM | RESPIRATION RATE: 20 BRPM | BODY MASS INDEX: 32.85 KG/M2 | HEIGHT: 64 IN | DIASTOLIC BLOOD PRESSURE: 100 MMHG | TEMPERATURE: 97.8 F | SYSTOLIC BLOOD PRESSURE: 170 MMHG | WEIGHT: 192.4 LBS | OXYGEN SATURATION: 95 %

## 2018-11-08 DIAGNOSIS — R11.10 ACUTE VOMITING: Primary | ICD-10-CM

## 2018-11-08 DIAGNOSIS — I10 ESSENTIAL HYPERTENSION: ICD-10-CM

## 2018-11-08 RX ORDER — ONDANSETRON 4 MG/1
4 TABLET, ORALLY DISINTEGRATING ORAL
Qty: 20 TAB | Refills: 0 | Status: SHIPPED | OUTPATIENT
Start: 2018-11-08 | End: 2018-12-24

## 2018-11-08 RX ORDER — AMLODIPINE BESYLATE 5 MG/1
5 TABLET ORAL DAILY
Qty: 30 TAB | Refills: 0 | Status: SHIPPED | OUTPATIENT
Start: 2018-11-08 | End: 2018-12-24

## 2018-11-08 NOTE — PROGRESS NOTES
History of Present Illness Macrina Shepherd is a 80 y.o. female who presents today for management of 
 
Chief Complaint Patient presents with  Vomiting Nausea / Vomiting Patient complains of nausea and vomiting. Onset of symptoms was a few hours ago. Patient describes nausea as mild. Vomiting has occurred 2 times over the past hours. Vomitus is described as undigested food. It started after she ate sub sandwich while at work. Symptoms have been associated with ability to keep down some fluids. Patient denies hematemesis, melena, fever. Course to date has been gradually improving. Evaluation to date has been none. Treatment to date has been oral fluids. Problem List 
Patient Active Problem List  
 Diagnosis Date Noted  Pure hypercholesterolemia 01/09/2018  Osteopenia, senile 01/09/2018  Urinary, incontinence, stress female 12/12/2017  Advance care planning 12/12/2017 Past Medical History Past Medical History:  
Diagnosis Date  Indigestion  Osteopenia  Urinary incontinence Surgical History Past Surgical History:  
Procedure Laterality Date  HX ANKLE FRACTURE TX    
 HX TUBAL LIGATION    
 25 years ago Current Medications Current Outpatient Medications Medication Sig  
 amLODIPine (NORVASC) 5 mg tablet Take 1 Tab by mouth daily.  ondansetron (ZOFRAN ODT) 4 mg disintegrating tablet Take 1 Tab by mouth every eight (8) hours as needed for Nausea.  pantoprazole (PROTONIX) 40 mg tablet  oxybutynin (DITROPAN) 5 mg tablet Take 1 Tab by mouth three (3) times daily.  cholecalciferol (VITAMIN D3) 1,000 unit tablet Take  by mouth daily.  calcium 500 mg Tab Take  by mouth daily. No current facility-administered medications for this visit. Allergies/Drug Reactions No Known Allergies Family History Family History Problem Relation Age of Onset  Hypertension Father Social History Social History Socioeconomic History  Marital status:  Spouse name: Not on file  Number of children: Not on file  Years of education: Not on file  Highest education level: Not on file Social Needs  Financial resource strain: Not on file  Food insecurity - worry: Not on file  Food insecurity - inability: Not on file  Transportation needs - medical: Not on file  Transportation needs - non-medical: Not on file Occupational History  Not on file Tobacco Use  Smoking status: Never Smoker  Smokeless tobacco: Never Used Substance and Sexual Activity  Alcohol use: Yes Alcohol/week: 0.6 oz Types: 1 Glasses of wine per week Comment: occasional glass of  wine  Drug use: No  
 Sexual activity: No  
Other Topics Concern  Not on file Social History Narrative  Not on file Review of Systems Negative except as mentioned in HPI Physical Exam 
Vital signs:  
Vitals:  
 11/08/18 1533 11/08/18 1538 11/08/18 1553 BP: (!) 202/105 (!) 182/97 (!) 170/100 Pulse: 67 Resp: 20 Temp: 97.8 °F (36.6 °C) TempSrc: Oral    
SpO2: 95% Weight: 192 lb 6.4 oz (87.3 kg) Height: 5' 4\" (1.626 m) General: alert, oriented, not in distress Eyes: clear conjunctivae, anicteric sclerae, full and equal ROMs Chest/Lungs: clear breath sounds, no wheezing or crackles Heart: normal rate, regular rhythm, no murmur Abdomen: soft, non-distended, non-tender, normal bowel sounds, no organomegaly, no masses Extremities: no focal deformities, no edema Neuro: AAOx3, CN's grossly intact Skin: no visible abnormalities Laboratory/Tests: 
ECHOCARDIOGRAM 10/12/18 · Estimated left ventricular ejection fraction is 56 - 60%. Visually measured ejection fraction. Left ventricular mild concentric hypertrophy. Age-appropriate left ventricular diastolic function. · Right ventricular cavity size is mildly dilated. · Right atrial cavity size is mildly dilated. · Left atrial cavity size is mildly dilated. · Mitral valve non-specific thickening. Mild mitral valve regurgitation. · Non-specific thickening of the tricuspid valve. Mild tricuspid valve regurgitation is present. · Mild pulmonic valve regurgitation is present. Assessment/Plan: 1. Acute vomiting 
- ? Food poisoning  
- increase oral fluid intake 
- ondansetron (ZOFRAN ODT) 4 mg disintegrating tablet; Take 1 Tab by mouth every eight (8) hours as needed for Nausea. Dispense: 20 Tab; Refill: 0 
 
2. Essential hypertension 
- newly diagnosed 
- start amLODIPine (NORVASC) 5 mg tablet; Take 1 Tab by mouth daily. Dispense: 30 Tab; Refill: 0 
- low salt diet Follow-up Disposition: 
Return in about 1 week (around 11/15/2018) for bp check. I have discussed the diagnosis with the patient and the intended plan as seen in the above orders. The patient has received an after-visit summary and questions were answered concerning future plans. I have discussed medication side effects and warnings with the patient as well. I have reviewed the plan of care with the patient, accepted their input and they are in agreement with the treatment goals. Will Bhatt MD 
November 8, 2018

## 2018-11-08 NOTE — LETTER
NOTIFICATION RETURN TO WORK / SCHOOL 
 
11/8/2018 3:55 PM 
 
Ms. Laurie Hendrix [facility] [address] [facility] [address] To Whom It May Concern: 
 
[facility] She will return to work/school on: 11/12/2018 If there are questions or concerns please have the patient contact our office. Sincerely, Chrissy Graves MD

## 2018-11-08 NOTE — PROGRESS NOTES
1. Have you been to the ER, urgent care clinic since your last visit? Hospitalized since your last visit? No 
 
2. Have you seen or consulted any other health care providers outside of the [address] [place] [address] since your last visit? Include any pap smears or colon screening.  No

## 2018-11-27 ENCOUNTER — DOCUMENTATION ONLY (OUTPATIENT)
Dept: FAMILY MEDICINE CLINIC | Age: 83
End: 2018-11-27

## 2018-11-27 NOTE — LETTER
11/27/2018 [facility] [address] [address] Dear Ms. Tray Tolliver, We had an appointment reserved for you on 11/26/18 and were concerned when you did not show or call within 24 hours to cancel the appointment. Our policy is to call patients two days prior to their appointment to remind them of the date and time. We perform these calls as a courtesy to our patients and to allow us the opportunity to rebook the time slot should the appointment not be necessary. Recognizing that everyones time is valuable and that appointment time is limited, we ask that you provide 24 hours notice if you are unable to keep your appointment. Please call us at your earliest convenience to reschedule your appointment as your provider felt it was important to see you. Thank you for your anticipated cooperation. [address] [place] [id number] [phone]

## 2018-12-24 ENCOUNTER — OFFICE VISIT (OUTPATIENT)
Dept: FAMILY MEDICINE CLINIC | Age: 83
End: 2018-12-24

## 2018-12-24 VITALS
OXYGEN SATURATION: 95 % | HEIGHT: 64 IN | WEIGHT: 186 LBS | SYSTOLIC BLOOD PRESSURE: 107 MMHG | RESPIRATION RATE: 18 BRPM | HEART RATE: 74 BPM | BODY MASS INDEX: 31.76 KG/M2 | TEMPERATURE: 97.3 F | DIASTOLIC BLOOD PRESSURE: 63 MMHG

## 2018-12-24 DIAGNOSIS — E55.9 VITAMIN D DEFICIENCY: ICD-10-CM

## 2018-12-24 DIAGNOSIS — M85.80 OSTEOPENIA, SENILE: ICD-10-CM

## 2018-12-24 DIAGNOSIS — N39.3 URINARY, INCONTINENCE, STRESS FEMALE: ICD-10-CM

## 2018-12-24 DIAGNOSIS — J40 BRONCHITIS: Primary | ICD-10-CM

## 2018-12-24 DIAGNOSIS — E78.00 PURE HYPERCHOLESTEROLEMIA: ICD-10-CM

## 2018-12-24 NOTE — PROGRESS NOTES
Elder Saini is a 80 y.o. female  Chief Complaint   Patient presents with    Headache     1. Have you been to the ER, urgent care clinic since your last visit? Hospitalized since your last visit? No    2. Have you seen or consulted any other health care providers outside of the [address] [place] [address] since your last visit? Include any pap smears or colon screening.  No

## 2018-12-24 NOTE — PROGRESS NOTES
History of Present Illness  Soledad Martin is a 80 y.o. female who presents today for management of    Chief Complaint   Patient presents with    Cough    Hypertension       Upper Respiratory Infection  Patient complains of symptoms of a URI. Symptoms include congestion, coryza and cough. Onset of symptoms was 2 weeks ago, gradually improving since that time. She also c/o no  fever and no headache for the past 2 weeks . She is drinking plenty of fluids. . Evaluation to date: none. Treatment to date: Robitussin. Cardiovascular Review:  The patient has hypertension. Diet and Lifestyle: generally follows a low fat low cholesterol diet, generally follows a low sodium diet, sedentary  Home BP Monitoring: is not measured at home. Pertinent ROS: she has not been taking the amlodipine as instructed, no medication side effects noted, no TIA's, no chest pain on exertion, no dyspnea on exertion, no swelling of ankles. Problem List  Patient Active Problem List    Diagnosis Date Noted    Pure hypercholesterolemia 01/09/2018    Osteopenia, senile 01/09/2018    Urinary, incontinence, stress female 12/12/2017    Advance care planning 12/12/2017       Past Medical History  Past Medical History:   Diagnosis Date    Indigestion     Osteopenia     Urinary incontinence         Surgical History  Past Surgical History:   Procedure Laterality Date    HX ANKLE FRACTURE TX      HX TUBAL LIGATION      25 years ago        Current Medications  Current Outpatient Medications   Medication Sig    oxybutynin (DITROPAN) 5 mg tablet Take 1 Tab by mouth three (3) times daily.  cholecalciferol (VITAMIN D3) 1,000 unit tablet Take  by mouth daily.  calcium 500 mg Tab Take  by mouth daily. No current facility-administered medications for this visit.         Allergies/Drug Reactions  No Known Allergies     Family History  Family History   Problem Relation Age of Onset    Hypertension Father         Social History  Social History     Socioeconomic History    Marital status:      Spouse name: Not on file    Number of children: Not on file    Years of education: Not on file    Highest education level: Not on file   Social Needs    Financial resource strain: Not on file    Food insecurity - worry: Not on file    Food insecurity - inability: Not on file    Transportation needs - medical: Not on file   [place] needs - non-medical: Not on file   Occupational History    Not on file   Tobacco Use    Smoking status: Never Smoker    Smokeless tobacco: Never Used   Substance and Sexual Activity    Alcohol use: Yes     Alcohol/week: 0.6 oz     Types: 1 Glasses of wine per week     Comment: occasional glass of  wine    Drug use: No    Sexual activity: No   Other Topics Concern    Not on file   Social History Narrative    Not on file       Review of Systems  Negative except as mentioned in HPI      Physical Exam  Vital signs:   Vitals:    12/24/18 0903   BP: 107/63   Pulse: 74   Resp: 18   Temp: 97.3 °F (36.3 °C)   TempSrc: Oral   SpO2: 95%   Weight: 186 lb (84.4 kg)   Height: 5' 4\" (1.626 m)       General: alert, oriented, not in distress  HEENT:  Head: Normocephalic. Right Ear: External ear normal. Patent ear canal, TM intact. Left Ear: External ear normal. Patent ear canal, TM intact. Nose: Nose normal.   Mouth/Throat: Oropharynx is clear and moist. No oropharyngeal exudate. Eyes: Conjunctivae and EOM are normal. No scleral icterus. Neck: Normal range of motion. Neck supple. No thyromegaly present. Lymphadenopathy: no cervical adenopathy. Chest/Lungs: clear breath sounds, no wheezing or crackles  Heart: normal rate, regular rhythm, no murmur  Extremities: no focal deformities, no edema  Neuro: AAOx3, CN's grossly intact  Skin: no visible abnormalities    Assessment/Plan:    1. Bronchitis  - likely viral  - symptomatic care    2.  Pure hypercholesterolemia  - diet-controlled  - CBC WITH AUTOMATED DIFF; Future  - LIPID PANEL; Future  - METABOLIC PANEL, COMPREHENSIVE; Future    3. Osteopenia, senile  - weight lifting exercises  - VITAMIN D, 25 HYDROXY; Future    4. Urinary, incontinence, stress female  - URINALYSIS W/ RFLX MICROSCOPIC; Future    5. Vitamin D deficiency   - VITAMIN D, 25 HYDROXY; Future      Follow-up Disposition:  Return in about 6 months (around 6/24/2019) for rov. I have discussed the diagnosis with the patient and the intended plan as seen in the above orders. The patient has received an after-visit summary and questions were answered concerning future plans. I have discussed medication side effects and warnings with the patient as well. I have reviewed the plan of care with the patient, accepted their input and they are in agreement with the treatment goals.        Latasha Pacheco MD  December 24, 2018

## 2018-12-26 ENCOUNTER — TELEPHONE (OUTPATIENT)
Dept: FAMILY MEDICINE CLINIC | Age: 83
End: 2018-12-26

## 2018-12-26 DIAGNOSIS — R22.1 SENSATION OF LUMP IN THROAT: Primary | ICD-10-CM

## 2018-12-26 DIAGNOSIS — N39.0 UTI (URINARY TRACT INFECTION), UNCOMPLICATED: Primary | ICD-10-CM

## 2018-12-26 LAB
25(OH)D3+25(OH)D2 SERPL-MCNC: 30.1 NG/ML (ref 30–100)
ALBUMIN SERPL-MCNC: 4.2 G/DL (ref 3.5–4.7)
ALBUMIN/GLOB SERPL: 1.8 {RATIO} (ref 1.2–2.2)
ALP SERPL-CCNC: 74 IU/L (ref 39–117)
ALT SERPL-CCNC: 14 IU/L (ref 0–32)
APPEARANCE UR: CLEAR
AST SERPL-CCNC: 20 IU/L (ref 0–40)
BACTERIA #/AREA URNS HPF: ABNORMAL /[HPF]
BASOPHILS # BLD AUTO: 0 X10E3/UL (ref 0–0.2)
BASOPHILS NFR BLD AUTO: 0 %
BILIRUB SERPL-MCNC: 0.7 MG/DL (ref 0–1.2)
BILIRUB UR QL STRIP: NEGATIVE
BUN SERPL-MCNC: 14 MG/DL (ref 8–27)
BUN/CREAT SERPL: 20 (ref 12–28)
CALCIUM SERPL-MCNC: 9.3 MG/DL (ref 8.7–10.3)
CASTS URNS MICRO: ABNORMAL
CASTS URNS QL MICRO: PRESENT /LPF
CHLORIDE SERPL-SCNC: 104 MMOL/L (ref 96–106)
CHOLEST SERPL-MCNC: 200 MG/DL (ref 100–199)
CO2 SERPL-SCNC: 27 MMOL/L (ref 20–29)
COLOR UR: YELLOW
CREAT SERPL-MCNC: 0.71 MG/DL (ref 0.57–1)
CRYSTALS URNS MICRO: ABNORMAL
EOSINOPHIL # BLD AUTO: 0.1 X10E3/UL (ref 0–0.4)
EOSINOPHIL NFR BLD AUTO: 1 %
EPI CELLS #/AREA URNS HPF: ABNORMAL /HPF
ERYTHROCYTE [DISTWIDTH] IN BLOOD BY AUTOMATED COUNT: 13.7 % (ref 12.3–15.4)
GLOBULIN SER CALC-MCNC: 2.4 G/DL (ref 1.5–4.5)
GLUCOSE SERPL-MCNC: 106 MG/DL (ref 65–99)
GLUCOSE UR QL: NEGATIVE
HCT VFR BLD AUTO: 37.7 % (ref 34–46.6)
HDLC SERPL-MCNC: 74 MG/DL
HGB BLD-MCNC: 12.2 G/DL (ref 11.1–15.9)
HGB UR QL STRIP: NEGATIVE
IMM GRANULOCYTES # BLD: 0 X10E3/UL (ref 0–0.1)
IMM GRANULOCYTES NFR BLD: 0 %
KETONES UR QL STRIP: ABNORMAL
LDLC SERPL CALC-MCNC: 114 MG/DL (ref 0–99)
LEUKOCYTE ESTERASE UR QL STRIP: NEGATIVE
LYMPHOCYTES # BLD AUTO: 1.4 X10E3/UL (ref 0.7–3.1)
LYMPHOCYTES NFR BLD AUTO: 18 %
MCH RBC QN AUTO: 27.1 PG (ref 26.6–33)
MCHC RBC AUTO-ENTMCNC: 32.4 G/DL (ref 31.5–35.7)
MCV RBC AUTO: 84 FL (ref 79–97)
MICRO URNS: ABNORMAL
MONOCYTES # BLD AUTO: 0.7 X10E3/UL (ref 0.1–0.9)
MONOCYTES NFR BLD AUTO: 9 %
MUCOUS THREADS URNS QL MICRO: PRESENT
NEUTROPHILS # BLD AUTO: 5.5 X10E3/UL (ref 1.4–7)
NEUTROPHILS NFR BLD AUTO: 72 %
NITRITE UR QL STRIP: POSITIVE
PH UR STRIP: 5.5 [PH] (ref 5–7.5)
PLATELET # BLD AUTO: 243 X10E3/UL (ref 150–379)
POTASSIUM SERPL-SCNC: 4.3 MMOL/L (ref 3.5–5.2)
PROT SERPL-MCNC: 6.6 G/DL (ref 6–8.5)
PROT UR QL STRIP: NEGATIVE
RBC # BLD AUTO: 4.5 X10E6/UL (ref 3.77–5.28)
RBC #/AREA URNS HPF: ABNORMAL /HPF
SODIUM SERPL-SCNC: 144 MMOL/L (ref 134–144)
SP GR UR: >=1.03 (ref 1–1.03)
TRIGL SERPL-MCNC: 59 MG/DL (ref 0–149)
UNIDENT CRYS URNS QL MICRO: PRESENT
UROBILINOGEN UR STRIP-MCNC: 1 MG/DL (ref 0.2–1)
VLDLC SERPL CALC-MCNC: 12 MG/DL (ref 5–40)
WBC # BLD AUTO: 7.6 X10E3/UL (ref 3.4–10.8)
WBC #/AREA URNS HPF: ABNORMAL /HPF

## 2018-12-26 RX ORDER — CIPROFLOXACIN 250 MG/1
250 TABLET, FILM COATED ORAL 2 TIMES DAILY
Qty: 14 TAB | Refills: 0 | Status: SHIPPED | OUTPATIENT
Start: 2018-12-26 | End: 2019-01-02

## 2018-12-26 NOTE — TELEPHONE ENCOUNTER
Called patient told urine results per Dr. Tessa Marin . Patient requesting a referral for ENT states she is still having throat problems.

## 2019-01-14 ENCOUNTER — HOSPITAL ENCOUNTER (EMERGENCY)
Age: 84
Discharge: HOME | End: 2019-01-14
Attending: EMERGENCY MEDICINE
Payer: MEDICARE

## 2019-01-14 VITALS
BODY MASS INDEX: 29.02 KG/M2 | HEART RATE: 87 BPM | DIASTOLIC BLOOD PRESSURE: 94 MMHG | RESPIRATION RATE: 16 BRPM | OXYGEN SATURATION: 97 % | WEIGHT: 170 LBS | HEIGHT: 64 IN | TEMPERATURE: 99.2 F | SYSTOLIC BLOOD PRESSURE: 157 MMHG

## 2019-01-14 DIAGNOSIS — H66.91 ACUTE RIGHT OTITIS MEDIA: Primary | ICD-10-CM

## 2019-01-14 DIAGNOSIS — R03.0 ELEVATED BLOOD PRESSURE READING: ICD-10-CM

## 2019-01-14 DIAGNOSIS — H92.01 RIGHT EAR PAIN: ICD-10-CM

## 2019-01-14 DIAGNOSIS — J06.9 UPPER RESPIRATORY TRACT INFECTION, UNSPECIFIED TYPE: ICD-10-CM

## 2019-01-14 PROCEDURE — 99283 EMERGENCY DEPT VISIT LOW MDM: CPT

## 2019-01-14 PROCEDURE — 74011250637 HC RX REV CODE- 250/637: Performed by: NURSE PRACTITIONER

## 2019-01-14 RX ORDER — ACETAMINOPHEN 500 MG
1000 TABLET ORAL
Qty: 20 TAB | Refills: 0 | Status: SHIPPED | OUTPATIENT
Start: 2019-01-14 | End: 2020-03-03

## 2019-01-14 RX ORDER — AMOXICILLIN AND CLAVULANATE POTASSIUM 875; 125 MG/1; MG/1
1 TABLET, FILM COATED ORAL
Status: COMPLETED | OUTPATIENT
Start: 2019-01-14 | End: 2019-01-14

## 2019-01-14 RX ORDER — ALBUTEROL SULFATE 90 UG/1
1 AEROSOL, METERED RESPIRATORY (INHALATION)
Qty: 1 INHALER | Refills: 0 | Status: SHIPPED | OUTPATIENT
Start: 2019-01-14 | End: 2019-12-20 | Stop reason: SDUPTHER

## 2019-01-14 RX ORDER — ACETAMINOPHEN 500 MG
1000 TABLET ORAL
Status: COMPLETED | OUTPATIENT
Start: 2019-01-14 | End: 2019-01-14

## 2019-01-14 RX ORDER — AMOXICILLIN AND CLAVULANATE POTASSIUM 875; 125 MG/1; MG/1
1 TABLET, FILM COATED ORAL 2 TIMES DAILY
Qty: 20 TAB | Refills: 0 | Status: SHIPPED | OUTPATIENT
Start: 2019-01-14 | End: 2019-06-24 | Stop reason: ALTCHOICE

## 2019-01-14 RX ADMIN — ACETAMINOPHEN 1000 MG: 500 TABLET, FILM COATED ORAL at 15:05

## 2019-01-14 RX ADMIN — AMOXICILLIN AND CLAVULANATE POTASSIUM 1 TABLET: 875; 125 TABLET, FILM COATED ORAL at 15:05

## 2019-01-14 NOTE — DISCHARGE INSTRUCTIONS
Patient Education        Earache: Care Instructions  Your Care Instructions    Even though infection is a common cause of ear pain, not all ear pain means an infection. If you have ear pain and don't have an infection, it could be because of a jaw problem, such as temporomandibular joint (TMJ) pain. Or it could be because of a neck problem. When ear discomfort or pain is mild or comes and goes without other symptoms, home treatment may be all you need. Follow-up care is a key part of your treatment and safety. Be sure to make and go to all appointments, and call your doctor if you are having problems. It's also a good idea to know your test results and keep a list of the medicines you take. How can you care for yourself at home? · Apply heat on the ear to ease pain. To apply heat, put a warm water bottle, a heating pad set on low, or a warm cloth on your ear. Do not go to sleep with a heating pad on your skin. · Take an over-the-counter pain medicine, such as acetaminophen (Tylenol), ibuprofen (Advil, Motrin), or naproxen (Aleve). Be safe with medicines. Read and follow all instructions on the label. · Do not take two or more pain medicines at the same time unless the doctor told you to. Many pain medicines have acetaminophen, which is Tylenol. Too much acetaminophen (Tylenol) can be harmful. · Never insert anything, such as a cotton swab or a erna pin, into the ear. When should you call for help? Call your doctor now or seek immediate medical care if:    · You have new or worse symptoms of infection, such as:  ? Increased pain, swelling, warmth, or redness. ? Red streaks leading from the area. ? Pus draining from the area. ? A fever.    Watch closely for changes in your health, and be sure to contact your doctor if:    · You have new or worse discharge coming from the ear.     · You do not get better as expected. Where can you learn more? Go to http://bailey-luisa.info/.   Enter P720 in the search box to learn more about \"Earache: Care Instructions. \"  Current as of: 2018  Content Version: 11.8  © 2720-5809 [email or web address]. Care instructions adapted under license by [employer] (which disclaims liability or warranty for this information). If you have questions about a medical condition or this instruction, always ask your healthcare professional. [address] any warranty or liability for your use of this information. CardStar Activation    Thank you for requesting access to CardStar. Please follow the instructions below to securely access and download your online medical record. CardStar allows you to send messages to your doctor, view your test results, renew your prescriptions, schedule appointments, and more. How Do I Sign Up? 1. In your internet browser, go to [email or web address]  2. Click on the First Time User? Click Here link in the Sign In box. You will be redirect to the New Member Sign Up page. 3. Enter your CardStar Access Code exactly as it appears below. You will not need to use this code after youve completed the sign-up process. If you do not sign up before the expiration date, you must request a new code. CardStar Access Code: [email or web address]  Expires: 2019  2:46 PM (This is the date your CardStar access code will )    4. Enter the last four digits of your Social Security Number (xxxx) and Date of Birth (mm/dd/yyyy) as indicated and click Submit. You will be taken to the next sign-up page. 5. Create a CardStar ID. This will be your CardStar login ID and cannot be changed, so think of one that is secure and easy to remember. 6. Create a CardStar password. You can change your password at any time. 7. Enter your Password Reset Question and Answer. This can be used at a later time if you forget your password. 8. Enter your e-mail address.  You will receive e-mail notification when new information is available in [address] 9. Click Sign Up. You can now view and download portions of your medical record. 10. Click the Download Summary menu link to download a portable copy of your medical information. Additional Information    If you have questions, please visit the Frequently Asked Questions section of the HealthMicro website at [email or web address] Remember, HealthMicro is NOT to be used for urgent needs. For medical emergencies, dial 911. Discharge Review:    1. The reason for my/the patient presentation in the  Emergency Department today has been evaluated by            _____ Yes     _____ No  the medical provider to my satisfaction. 2.  The results of studies such as X-Rays and Laboratory  work have been discussed with me/the patient.                       _____ Yes     _____No    3.   I have discussed the discharge diagnosis with the         Medical provider and I understand and agree with the   aftercare plan.                      _____Yes     _____No      Signature  __________________________________________________________

## 2019-01-14 NOTE — ED PROVIDER NOTES
HPI  
 
81 y/o female with h/o osteopenia presents to the ED c/o total body aches, rhinorrhea, right ear pain, and congestion since this morning. The pt reports \"I have never had a runny nose before and I am concerned I have pneumonia\". Denies taking any OTC medications PTA. The pt denies fever, chills, abdominal pain, CP, SOB, and any other associated sxs. Past Medical History:  
Diagnosis Date  Indigestion  Osteopenia  Urinary incontinence Past Surgical History:  
Procedure Laterality Date  HX ANKLE FRACTURE TX    
 HX TUBAL LIGATION    
 25 years ago Family History:  
Problem Relation Age of Onset  Hypertension Father Social History Socioeconomic History  Marital status:  Spouse name: Not on file  Number of children: Not on file  Years of education: Not on file  Highest education level: Not on file Social Needs  Financial resource strain: Not on file  Food insecurity - worry: Not on file  Food insecurity - inability: Not on file  Transportation needs - medical: Not on file  Transportation needs - non-medical: Not on file Occupational History  Not on file Tobacco Use  Smoking status: Never Smoker  Smokeless tobacco: Never Used Substance and Sexual Activity  Alcohol use: Yes Alcohol/week: 0.6 oz Types: 1 Glasses of wine per week Comment: occasional glass of  wine  Drug use: No  
 Sexual activity: No  
Other Topics Concern  Not on file Social History Narrative  Not on file ALLERGIES: Patient has no known allergies. Review of Systems Constitutional: Negative for chills and fever. Reports body aches HENT: Positive for congestion, ear pain (left) and rhinorrhea. Eyes: Negative for pain. Respiratory: Negative for shortness of breath. Cardiovascular: Negative for chest pain. Gastrointestinal: Negative for abdominal pain, diarrhea, nausea and vomiting. All other systems reviewed and are negative. There were no vitals filed for this visit. Physical Exam  
Constitutional: She appears well-developed and well-nourished. No distress. HENT:  
Head: Normocephalic and atraumatic. Nose: Nose normal.  
Right TM is erythematous without fluid level. Left TM is normal.    
Eyes: EOM are normal. Right eye exhibits no discharge. Left eye exhibits no discharge. No scleral icterus. Neck: Normal range of motion. Neck supple. No tracheal deviation present. Cardiovascular: Normal rate, regular rhythm and intact distal pulses. Pulmonary/Chest: Effort normal. She has wheezes (very slight in the bialteral upper lobes. ). Abdominal: Soft. She exhibits no distension. Genitourinary:  
Genitourinary Comments: NE 
  
Musculoskeletal: She exhibits no deformity. Neurological: She is alert. Coordination normal.  
Skin: Skin is warm and dry. NE. Psychiatric: She has a normal mood and affect. Her behavior is normal.  
Nursing note and vitals reviewed. MDM Number of Diagnoses or Management Options Acute right otitis media:  
Elevated blood pressure reading:  
Right ear pain:  
Upper respiratory tract infection, unspecified type:  
Diagnosis management comments: MDM:  Pt has a erythematous right TM. Will Start on [address] for this condition. Amena Zuluaga NP  3:41 PM 
 
PROGRESS NOTE:  One or more blood pressure readings were noted elevated during the Pt's presentation in the emergency department this date. This abnormal reading has been cited in the Pt's diagnosis, and they have been encouraged to follow up with their primary care physician, or referred to a consultant for further evaluation and treatment. Amena Zuluaga NP  3:44 PM 
 
 
 
 
Risk of Complications, Morbidity, and/or Mortality Presenting problems: low Diagnostic procedures: low Management options: low Procedures Scribe Attestation Chuck Burrell acting as a scribe for and in the presence of SEAN Hill 
     
January 14, 2019 at 2:50 PM 
    
Provider Attestation:     
I personally performed the services described in the documentation, reviewed the documentation, as recorded by the scribe in my presence, and it accurately and completely records my words and actions. January 14, 2019 at 2:50 PM - SEAN Hill Diagnosis: 1. Acute right otitis media 2. Right ear pain 3. Upper respiratory tract infection, unspecified type 4. Elevated blood pressure reading Disposition:   Discharged to Home Follow-up Information Follow up With Specialties Details Why Contact Info Kia Fonseca MD Internal Medicine Call to arrange follow up. [address] [place] [id number] [phone] Medication List  
  
START taking these medications   
acetaminophen 500 mg tablet Commonly known as:  [address] Take 2 Tabs by mouth every eight (8) hours as needed for Pain. albuterol 90 mcg/actuation inhaler Commonly known as:  PROVENTIL HFA, VENTOLIN HFA, PROAIR HFA Take 1 Puff by inhalation every four (4) hours as needed for Wheezing. amoxicillin-clavulanate 875-125 mg per tablet Commonly known as:  AUGMENTIN Take 1 Tab by mouth two (2) times a day. ASK your doctor about these medications   
calcium 500 mg Tab 
  
oxybutynin 5 mg tablet Commonly known as:  DDPTQYEL Take 1 Tab by mouth three (3) times daily. VITAMIN D3 1,000 unit tablet Generic drug:  cholecalciferol Where to Get Your Medications Information about where to get these medications is not yet available Ask your nurse or doctor about these medications · acetaminophen 500 mg tablet · albuterol 90 mcg/actuation inhaler · amoxicillin-clavulanate 875-125 mg per tablet

## 2019-06-24 ENCOUNTER — OFFICE VISIT (OUTPATIENT)
Dept: FAMILY MEDICINE CLINIC | Age: 84
End: 2019-06-24

## 2019-06-24 ENCOUNTER — TELEPHONE (OUTPATIENT)
Dept: FAMILY MEDICINE CLINIC | Age: 84
End: 2019-06-24

## 2019-06-24 VITALS
RESPIRATION RATE: 18 BRPM | DIASTOLIC BLOOD PRESSURE: 60 MMHG | SYSTOLIC BLOOD PRESSURE: 120 MMHG | HEART RATE: 86 BPM | OXYGEN SATURATION: 95 % | TEMPERATURE: 98.3 F | WEIGHT: 185.2 LBS | HEIGHT: 64 IN | BODY MASS INDEX: 31.62 KG/M2

## 2019-06-24 DIAGNOSIS — M85.80 OSTEOPENIA, SENILE: ICD-10-CM

## 2019-06-24 DIAGNOSIS — N39.3 URINARY, INCONTINENCE, STRESS FEMALE: ICD-10-CM

## 2019-06-24 DIAGNOSIS — E78.00 PURE HYPERCHOLESTEROLEMIA: Primary | ICD-10-CM

## 2019-06-24 RX ORDER — HYDROQUINONE 40 MG/G
CREAM TOPICAL
Refills: 3 | COMMUNITY
Start: 2019-05-28 | End: 2020-03-03

## 2019-06-24 NOTE — PROGRESS NOTES
1. Have you been to the ER, urgent care clinic since your last visit? Hospitalized since your last visit? 1/14/18 [facility]- cold symptoms    2. Have you seen or consulted any other health care providers outside of the [address] [place] [address] since your last visit? Include any pap smears or colon screening.  No

## 2019-06-24 NOTE — TELEPHONE ENCOUNTER
Patient asked if there is a medication she could take to get of her abdominal fat. Advised patient that there is no medication specifically for belly fat, but she can try sit ups. Patient verbalized understanding.

## 2019-06-24 NOTE — PROGRESS NOTES
History of Present Illness  Chuy Van is a 80 y.o. female who presents today for management of    Chief Complaint   Patient presents with    Cholesterol Problem    Osteopenia       Cardiovascular Review:  The patient has hyperlipidemia. Diet and Lifestyle: generally follows a low fat low cholesterol diet, generally follows a low sodium diet, sedentary  Home BP Monitoring: is not measured at home. Pertinent ROS: not taking medications regularly as instructed, no medication side effects noted, no TIA's, no chest pain on exertion, no dyspnea on exertion, no swelling of ankles. Problem List  Patient Active Problem List    Diagnosis Date Noted    Pure hypercholesterolemia 01/09/2018    Osteopenia, senile 01/09/2018    Urinary, incontinence, stress female 12/12/2017    Advance care planning 12/12/2017       Past Medical History  Past Medical History:   Diagnosis Date    Indigestion     Osteopenia     Urinary incontinence         Surgical History  Past Surgical History:   Procedure Laterality Date    HX ANKLE FRACTURE TX      HX TUBAL LIGATION      25 years ago        Current Medications  Current Outpatient Medications   Medication Sig    albuterol (PROVENTIL HFA, VENTOLIN HFA, PROAIR HFA) 90 mcg/actuation inhaler Take 1 Puff by inhalation every four (4) hours as needed for Wheezing.  acetaminophen (TYLENOL EXTRA STRENGTH) 500 mg tablet Take 2 Tabs by mouth every eight (8) hours as needed for Pain.  cholecalciferol (VITAMIN D3) 1,000 unit tablet Take  by mouth daily.  hydroquinone (ESOTERICA, MELQUIN) 4 % topical cream APPLY TO DARK SPOTS AT BEDTIME    calcium 500 mg Tab Take  by mouth daily. No current facility-administered medications for this visit.         Allergies/Drug Reactions  No Known Allergies     Family History  Family History   Problem Relation Age of Onset    Hypertension Father         Social History  Social History     Socioeconomic History    Marital status:  Spouse name: Not on file    Number of children: Not on file    Years of education: Not on file    Highest education level: Not on file   Occupational History    Not on file   Social Needs    Financial resource strain: Not on file    Food insecurity:     Worry: Not on file     Inability: Not on file    Transportation needs:     Medical: Not on file     Non-medical: Not on file   Tobacco Use    Smoking status: Never Smoker    Smokeless tobacco: Never Used   Substance and Sexual Activity    Alcohol use:  Yes     Alcohol/week: 0.6 oz     Types: 1 Glasses of wine per week     Comment: occasional glass of  wine    Drug use: No    Sexual activity: Never   Lifestyle    Physical activity:     Days per week: Not on file     Minutes per session: Not on file    Stress: Not on file   Relationships    Social connections:     Talks on phone: Not on file     Gets together: Not on file     Attends Confucianist service: Not on file     Active member of club or organization: Not on file     Attends meetings of clubs or organizations: Not on file     Relationship status: Not on file    Intimate partner violence:     Fear of current or ex partner: Not on file     Emotionally abused: Not on file     Physically abused: Not on file     Forced sexual activity: Not on file   Other Topics Concern    Not on file   Social History Narrative    Not on file       Review of Systems  Negative except as mentioned in HPI      Physical Exam  Vital signs:   Vitals:    06/24/19 0912   BP: 120/60   Pulse: 86   Resp: 18   Temp: 98.3 °F (36.8 °C)   TempSrc: Oral   SpO2: 95%   Weight: 185 lb 3.2 oz (84 kg)   Height: 5' 4\" (1.626 m)       General: alert, oriented, not in distress  Eyes: clear conjunctivae, anicteric sclerae, full and equal ROMs  Chest/Lungs: clear breath sounds, no wheezing or crackles  Heart: normal rate, regular rhythm, no murmur  Extremities: no focal deformities, no edema  Neuro: AAOx3, CN's grossly intact  Skin: no visible abnormalities    Laboratory/Tests:  Component      Latest Ref Rng & Units 12/24/2018 12/24/2018 12/24/2018 12/24/2018           9:19 AM  9:19 AM  9:19 AM  9:19 AM   WBC      3.4 - 10.8 x10E3/uL    7.6   RBC      3.77 - 5.28 x10E6/uL    4.50   HGB      11.1 - 15.9 g/dL    12.2   HCT      34.0 - 46.6 %    37.7   MCV      79 - 97 fL    84   MCH      26.6 - 33.0 pg    27.1   MCHC      31.5 - 35.7 g/dL    32.4   RDW      12.3 - 15.4 %    13.7   PLATELET      020 - 759 x10E3/uL    243   NEUTROPHILS      Not Estab. %    72   Lymphocytes      Not Estab. %    18   MONOCYTES      Not Estab. %    9   EOSINOPHILS      Not Estab. %    1   BASOPHILS      Not Estab. %    0   ABS. NEUTROPHILS      1.4 - 7.0 x10E3/uL    5.5   Abs Lymphocytes      0.7 - 3.1 x10E3/uL    1.4   ABS. MONOCYTES      0.1 - 0.9 x10E3/uL    0.7   ABS. EOSINOPHILS      0.0 - 0.4 x10E3/uL    0.1   ABS. BASOPHILS      0.0 - 0.2 x10E3/uL    0.0   IMMATURE GRANULOCYTES      Not Estab. %    0   ABS. IMM. GRANS.      0.0 - 0.1 x10E3/uL    0.0   Glucose      65 - 99 mg/dL   106 (H)    BUN      8 - 27 mg/dL   14    Creatinine      0.57 - 1.00 mg/dL   0.71    GFR est non-AA      >59 mL/min/1.73   79    GFR est AA      >59 mL/min/1.73   91    BUN/Creatinine ratio      12 - 28   20    Sodium      134 - 144 mmol/L   144    Potassium      3.5 - 5.2 mmol/L   4.3    Chloride      96 - 106 mmol/L   104    CO2      20 - 29 mmol/L   27    Calcium      8.7 - 10.3 mg/dL   9.3    Protein, total      6.0 - 8.5 g/dL   6.6    Albumin      3.5 - 4.7 g/dL   4.2    GLOBULIN, TOTAL      1.5 - 4.5 g/dL   2.4    A-G Ratio      1.2 - 2.2   1.8    Bilirubin, total      0.0 - 1.2 mg/dL   0.7    Alk.  phosphatase      39 - 117 IU/L   74    AST      0 - 40 IU/L   20    ALT (SGPT)      0 - 32 IU/L   14    Cholesterol, total      100 - 199 mg/dL  200 (H)     Triglyceride      0 - 149 mg/dL  59     HDL Cholesterol      >39 mg/dL  74     VLDL, calculated      5 - 40 mg/dL  12     LDL, calculated 0 - 99 mg/dL  114 (H)     VITAMIN D, 25-HYDROXY      30.0 - 100.0 ng/mL 30.1        Component      Latest Ref Rng & Units 12/24/2018 12/24/2018           9:19 AM  9:19 AM   Specific Gravity      1.005 - 1.030  >=1.030 (A)   pH (UA)      5.0 - 7.5  5.5   Color      Yellow  Yellow   Appearance      Clear  Clear   Leukocyte Esterase      Negative  Negative   Protein      Negative/Trace  Negative   Glucose      Negative  Negative   Ketone      Negative  Trace (A)   Blood      Negative  Negative   Bilirubin      Negative  Negative   Urobilinogen      0.2 - 1.0 mg/dL  1.0   Nitrites      Negative  Positive (A)   Microscopic Examination        See additional order   WBC      0 - 5 /hpf 0-5    RBC      0 - 2 /hpf 0-2    Epithelial cells      0 - 10 /hpf 0-10    Casts      None seen /lpf Present (A)    Cast type      N/A Hyaline casts    Crystals      N/A Present (A)    Crystal type      N/A Calcium Oxalate    Mucus      Not Estab. Present    Bacteria      None seen/Few Few        Assessment/Plan:      1. Pure hypercholesterolemia  - diet-controlled    2. Osteopenia, senile  - weight -bearing exercises  - continue calcium and vitamin D     3. Urinary, incontinence, stress female  - improved. Patient has not been taking oxybutynin.       Follow-up and Dispositions    · Return in about 6 months (around 12/24/2019) for [address] I have discussed the diagnosis with the patient and the intended plan as seen in the above orders. The patient has received an after-visit summary and questions were answered concerning future plans. I have discussed medication side effects and warnings with the patient as well. I have reviewed the plan of care with the patient, accepted their input and they are in agreement with the treatment goals.        Ivory Paredes MD  June 24, 2019

## 2019-06-24 NOTE — TELEPHONE ENCOUNTER
Pt called in and was wanting to speak with Dr. León Adames in regards to something she forgot to mention to her. Pt would not disclose what exactly it was but asked for a phone call back as soon as possible from Dr. Jeff Gonsalez. Please advise.

## 2019-08-14 ENCOUNTER — TELEPHONE (OUTPATIENT)
Dept: FAMILY MEDICINE CLINIC | Age: 84
End: 2019-08-14

## 2019-09-20 ENCOUNTER — TELEPHONE (OUTPATIENT)
Dept: FAMILY MEDICINE CLINIC | Age: 84
End: 2019-09-20

## 2019-09-20 NOTE — TELEPHONE ENCOUNTER
Pt. Is requesting referral sent to another gastro doctor because the current one does not accept her insurance. Please advise.

## 2019-09-25 NOTE — TELEPHONE ENCOUNTER
Spoke with patient, at this time, pt is scheduled to see [facility]. This encounter will be closed.

## 2019-12-20 ENCOUNTER — OFFICE VISIT (OUTPATIENT)
Dept: FAMILY MEDICINE CLINIC | Age: 84
End: 2019-12-20

## 2019-12-20 ENCOUNTER — HOSPITAL ENCOUNTER (OUTPATIENT)
Dept: LAB | Age: 84
Discharge: HOME | End: 2019-12-20
Payer: MEDICARE

## 2019-12-20 VITALS
SYSTOLIC BLOOD PRESSURE: 143 MMHG | TEMPERATURE: 97.8 F | WEIGHT: 188 LBS | BODY MASS INDEX: 32.1 KG/M2 | DIASTOLIC BLOOD PRESSURE: 80 MMHG | HEART RATE: 61 BPM | RESPIRATION RATE: 18 BRPM | OXYGEN SATURATION: 98 % | HEIGHT: 64 IN

## 2019-12-20 DIAGNOSIS — Z71.89 ADVANCE CARE PLANNING: ICD-10-CM

## 2019-12-20 DIAGNOSIS — R05.9 COUGH: ICD-10-CM

## 2019-12-20 DIAGNOSIS — M85.80 OSTEOPENIA, SENILE: ICD-10-CM

## 2019-12-20 DIAGNOSIS — Z23 ENCOUNTER FOR IMMUNIZATION: ICD-10-CM

## 2019-12-20 DIAGNOSIS — Z13.39 SCREENING FOR ALCOHOLISM: ICD-10-CM

## 2019-12-20 DIAGNOSIS — E78.00 PURE HYPERCHOLESTEROLEMIA: Primary | ICD-10-CM

## 2019-12-20 DIAGNOSIS — Z00.00 MEDICARE ANNUAL WELLNESS VISIT, SUBSEQUENT: ICD-10-CM

## 2019-12-20 DIAGNOSIS — E78.00 PURE HYPERCHOLESTEROLEMIA: ICD-10-CM

## 2019-12-20 DIAGNOSIS — Z13.31 SCREENING FOR DEPRESSION: ICD-10-CM

## 2019-12-20 DIAGNOSIS — R06.09 DYSPNEA ON EXERTION: ICD-10-CM

## 2019-12-20 LAB
ALBUMIN SERPL-MCNC: 3.6 G/DL (ref 3.4–5)
ALBUMIN/GLOB SERPL: 1.1 {RATIO} (ref 0.8–1.7)
ALP SERPL-CCNC: 84 U/L (ref 45–117)
ALT SERPL-CCNC: 19 U/L (ref 13–56)
ANION GAP SERPL CALC-SCNC: 4 MMOL/L (ref 3–18)
APPEARANCE UR: CLEAR
AST SERPL-CCNC: 17 U/L (ref 10–38)
BASOPHILS # BLD: 0 K/UL (ref 0–0.1)
BASOPHILS NFR BLD: 0 % (ref 0–2)
BILIRUB SERPL-MCNC: 0.7 MG/DL (ref 0.2–1)
BILIRUB UR QL: NEGATIVE
BUN SERPL-MCNC: 10 MG/DL (ref 7–18)
BUN/CREAT SERPL: 14 (ref 12–20)
CALCIUM SERPL-MCNC: 8.8 MG/DL (ref 8.5–10.1)
CHLORIDE SERPL-SCNC: 105 MMOL/L (ref 100–111)
CHOLEST SERPL-MCNC: 210 MG/DL
CO2 SERPL-SCNC: 32 MMOL/L (ref 21–32)
COLOR UR: YELLOW
CREAT SERPL-MCNC: 0.69 MG/DL (ref 0.6–1.3)
DIFFERENTIAL METHOD BLD: NORMAL
EOSINOPHIL # BLD: 0.1 K/UL (ref 0–0.4)
EOSINOPHIL NFR BLD: 2 % (ref 0–5)
ERYTHROCYTE [DISTWIDTH] IN BLOOD BY AUTOMATED COUNT: 13.8 % (ref 11.6–14.5)
GLOBULIN SER CALC-MCNC: 3.4 G/DL (ref 2–4)
GLUCOSE SERPL-MCNC: 119 MG/DL (ref 74–99)
GLUCOSE UR STRIP.AUTO-MCNC: NEGATIVE MG/DL
HCT VFR BLD AUTO: 40.8 % (ref 35–45)
HDLC SERPL-MCNC: 80 MG/DL (ref 40–60)
HDLC SERPL: 2.6 {RATIO} (ref 0–5)
HGB BLD-MCNC: 13 G/DL (ref 12–16)
HGB UR QL STRIP: NEGATIVE
KETONES UR QL STRIP.AUTO: NEGATIVE MG/DL
LDLC SERPL CALC-MCNC: 114.4 MG/DL (ref 0–100)
LEUKOCYTE ESTERASE UR QL STRIP.AUTO: NEGATIVE
LIPID PROFILE,FLP: ABNORMAL
LYMPHOCYTES # BLD: 1.5 K/UL (ref 0.9–3.6)
LYMPHOCYTES NFR BLD: 21 % (ref 21–52)
MCH RBC QN AUTO: 27.4 PG (ref 24–34)
MCHC RBC AUTO-ENTMCNC: 31.9 G/DL (ref 31–37)
MCV RBC AUTO: 86.1 FL (ref 74–97)
MONOCYTES # BLD: 0.5 K/UL (ref 0.05–1.2)
MONOCYTES NFR BLD: 8 % (ref 3–10)
NEUTS SEG # BLD: 4.9 K/UL (ref 1.8–8)
NEUTS SEG NFR BLD: 69 % (ref 40–73)
NITRITE UR QL STRIP.AUTO: NEGATIVE
PH UR STRIP: 6 [PH] (ref 5–8)
PLATELET # BLD AUTO: 224 K/UL (ref 135–420)
PMV BLD AUTO: 10.1 FL (ref 9.2–11.8)
POTASSIUM SERPL-SCNC: 3.8 MMOL/L (ref 3.5–5.5)
PROT SERPL-MCNC: 7 G/DL (ref 6.4–8.2)
PROT UR STRIP-MCNC: NEGATIVE MG/DL
RBC # BLD AUTO: 4.74 M/UL (ref 4.2–5.3)
SODIUM SERPL-SCNC: 141 MMOL/L (ref 136–145)
SP GR UR REFRACTOMETRY: 1.01 (ref 1–1.03)
T4 FREE SERPL-MCNC: 1 NG/DL (ref 0.7–1.5)
TRIGL SERPL-MCNC: 78 MG/DL (ref ?–150)
TSH SERPL DL<=0.05 MIU/L-ACNC: 1.02 UIU/ML (ref 0.36–3.74)
UROBILINOGEN UR QL STRIP.AUTO: 0.2 EU/DL (ref 0.2–1)
VLDLC SERPL CALC-MCNC: 15.6 MG/DL
WBC # BLD AUTO: 7 K/UL (ref 4.6–13.2)

## 2019-12-20 PROCEDURE — 85025 COMPLETE CBC W/AUTO DIFF WBC: CPT

## 2019-12-20 PROCEDURE — 36415 COLL VENOUS BLD VENIPUNCTURE: CPT

## 2019-12-20 PROCEDURE — 84439 ASSAY OF FREE THYROXINE: CPT

## 2019-12-20 PROCEDURE — 80053 COMPREHEN METABOLIC PANEL: CPT

## 2019-12-20 PROCEDURE — 80061 LIPID PANEL: CPT

## 2019-12-20 PROCEDURE — 81003 URINALYSIS AUTO W/O SCOPE: CPT

## 2019-12-20 RX ORDER — ALBUTEROL SULFATE 90 UG/1
1 AEROSOL, METERED RESPIRATORY (INHALATION)
Qty: 1 INHALER | Refills: 0 | Status: SHIPPED | OUTPATIENT
Start: 2019-12-20 | End: 2021-05-17 | Stop reason: SDUPTHER

## 2019-12-20 NOTE — PROGRESS NOTES
History of Present Illness  Guevara Almeida is a 80 y.o. female who presents today for management of    Chief Complaint   Patient presents with    Annual Wellness Visit    Breathing Problem    Cough       Cardiovascular Review:  The patient has hyperlipidemia. Diet and Lifestyle: generally follows a low fat low cholesterol diet, generally follows a low sodium diet, sedentary  Home BP Monitoring: is not measured at home. Pertinent ROS: not taking medications regularly as instructed, no medication side effects noted, no TIA's, no chest pain on exertion, notes stable dyspnea on exertion, no swelling of ankles. Problem List  Patient Active Problem List    Diagnosis Date Noted    Pure hypercholesterolemia 01/09/2018    Osteopenia, senile 01/09/2018    Urinary, incontinence, stress female 12/12/2017    Advance care planning 12/12/2017       Past Medical History  Past Medical History:   Diagnosis Date    Indigestion     Osteopenia     Urinary incontinence         Surgical History  Past Surgical History:   Procedure Laterality Date    HX ANKLE FRACTURE TX      HX TUBAL LIGATION      25 years ago        Current Medications  Current Outpatient Medications   Medication Sig    albuterol (PROVENTIL HFA, VENTOLIN HFA, PROAIR HFA) 90 mcg/actuation inhaler Take 1 Puff by inhalation every four (4) hours as needed for Shortness of Breath or Cough.  mirabegron ER (MYRBETRIQ) 25 mg ER tablet Take 1 Tab by mouth daily.  hydroquinone (ESOTERICA, MELQUIN) 4 % topical cream APPLY TO DARK SPOTS AT BEDTIME    acetaminophen (TYLENOL EXTRA STRENGTH) 500 mg tablet Take 2 Tabs by mouth every eight (8) hours as needed for Pain.  cholecalciferol (VITAMIN D3) 1,000 unit tablet Take  by mouth daily.  calcium 500 mg Tab Take  by mouth daily. No current facility-administered medications for this visit.         Allergies/Drug Reactions  No Known Allergies     Family History  Family History   Problem Relation Age of Onset  Hypertension Father         Social History  Social History     Socioeconomic History    Marital status:      Spouse name: Not on file    Number of children: Not on file    Years of education: Not on file    Highest education level: Not on file   Occupational History    Not on file   Social Needs    Financial resource strain: Not on file    Food insecurity:     Worry: Not on file     Inability: Not on file    Transportation needs:     Medical: Not on file     Non-medical: Not on file   Tobacco Use    Smoking status: Never Smoker    Smokeless tobacco: Never Used   Substance and Sexual Activity    Alcohol use: Yes     Alcohol/week: 1.0 standard drinks     Types: 1 Glasses of wine per week     Comment: occasional glass of  wine    Drug use: No    Sexual activity: Never   Lifestyle    Physical activity:     Days per week: Not on file     Minutes per session: Not on file    Stress: Not on file   Relationships    Social connections:     Talks on phone: Not on file     Gets together: Not on file     Attends Christianity service: Not on file     Active member of club or organization: Not on file     Attends meetings of clubs or organizations: Not on file     Relationship status: Not on file    Intimate partner violence:     Fear of current or ex partner: Not on file     Emotionally abused: Not on file     Physically abused: Not on file     Forced sexual activity: Not on file   Other Topics Concern    Not on file   Social History Narrative    Not on file       Review of Systems  Review of Systems   Constitutional: Negative. HENT: Negative. Eyes: Negative. Respiratory: Positive for cough and shortness of breath. Negative for sputum production and wheezing. Cardiovascular: Negative for chest pain, palpitations, orthopnea, claudication and leg swelling. Gastrointestinal: Negative. Genitourinary: Negative. Musculoskeletal: Negative. Skin: Negative. Neurological: Negative. Psychiatric/Behavioral: Negative. Physical Exam  Vital signs:   Vitals:    12/20/19 0903   BP: 143/80   Pulse: 61   Resp: 18   Temp: 97.8 °F (36.6 °C)   TempSrc: Oral   SpO2: 98%   Weight: 188 lb (85.3 kg)   Height: 5' 4\" (1.626 m)       General: alert, oriented, not in distress  Eyes: clear conjunctivae, anicteric sclerae, full and equal ROMs  Chest/Lungs: clear breath sounds, no wheezing or crackles  Heart: normal rate, regular rhythm, no murmur  Extremities: no focal deformities, no edema  Neuro: AAOx3, CN's grossly intact  Skin: no visible abnormalities      Laboratory/Tests:  Component      Latest Ref Rng & Units 12/24/2018 12/24/2018 12/24/2018 12/24/2018           9:19 AM  9:19 AM  9:19 AM  9:19 AM   WBC      3.4 - 10.8 x10E3/uL    7.6   RBC      3.77 - 5.28 x10E6/uL    4.50   HGB      11.1 - 15.9 g/dL    12.2   HCT      34.0 - 46.6 %    37.7   MCV      79 - 97 fL    84   MCH      26.6 - 33.0 pg    27.1   MCHC      31.5 - 35.7 g/dL    32.4   RDW      12.3 - 15.4 %    13.7   PLATELET      043 - 262 x10E3/uL    243   NEUTROPHILS      Not Estab. %    72   Lymphocytes      Not Estab. %    18   MONOCYTES      Not Estab. %    9   EOSINOPHILS      Not Estab. %    1   BASOPHILS      Not Estab. %    0   ABS. NEUTROPHILS      1.4 - 7.0 x10E3/uL    5.5   Abs Lymphocytes      0.7 - 3.1 x10E3/uL    1.4   ABS. MONOCYTES      0.1 - 0.9 x10E3/uL    0.7   ABS. EOSINOPHILS      0.0 - 0.4 x10E3/uL    0.1   ABS. BASOPHILS      0.0 - 0.2 x10E3/uL    0.0   IMMATURE GRANULOCYTES      Not Estab. %    0   ABS. IMM.  GRANS.      0.0 - 0.1 x10E3/uL    0.0   Glucose      65 - 99 mg/dL   106 (H)    BUN      8 - 27 mg/dL   14    Creatinine      0.57 - 1.00 mg/dL   0.71    GFR est non-AA      >59 mL/min/1.73   79    GFR est AA      >59 mL/min/1.73   91    BUN/Creatinine ratio      12 - 28   20    Sodium      134 - 144 mmol/L   144    Potassium      3.5 - 5.2 mmol/L   4.3    Chloride      96 - 106 mmol/L   104    CO2      20 - 29 mmol/L   27    Calcium      8.7 - 10.3 mg/dL   9.3    Protein, total      6.0 - 8.5 g/dL   6.6    Albumin      3.5 - 4.7 g/dL   4.2    GLOBULIN, TOTAL      1.5 - 4.5 g/dL   2.4    A-G Ratio      1.2 - 2.2   1.8    Bilirubin, total      0.0 - 1.2 mg/dL   0.7    Alk. phosphatase      39 - 117 IU/L   74    AST      0 - 40 IU/L   20    ALT (SGPT)      0 - 32 IU/L   14    Cholesterol, total      100 - 199 mg/dL  200 (H)     Triglyceride      0 - 149 mg/dL  59     HDL Cholesterol      >39 mg/dL  74     VLDL, calculated      5 - 40 mg/dL  12     LDL, calculated      0 - 99 mg/dL  114 (H)     VITAMIN D, 25-HYDROXY      30.0 - 100.0 ng/mL 30.1        ECHOCARDIOGRAM 10/2018  · Estimated left ventricular ejection fraction is 56 - 60%. Visually measured ejection fraction. Left ventricular mild concentric hypertrophy. Age-appropriate left ventricular diastolic function. · Right ventricular cavity size is mildly dilated. · Right atrial cavity size is mildly dilated. · Left atrial cavity size is mildly dilated. · Mitral valve non-specific thickening. Mild mitral valve regurgitation. · Non-specific thickening of the tricuspid valve. Mild tricuspid valve regurgitation is present. · Mild pulmonic valve regurgitation is present. Assessment/Plan:    1. Pure hypercholesterolemia  - diet-controlled  - CBC WITH AUTOMATED DIFF; Future  - LIPID PANEL; Future  - METABOLIC PANEL, COMPREHENSIVE; Future  - TSH AND FREE T4; Future  - URINALYSIS W/ RFLX MICROSCOPIC; Future    2. Osteopenia, senile  - continue calcium and vitamin D    3. Dyspnea on exertion  - echocardiogram in 2018 - normal  - monitor    4. Cough  - albuterol (PROVENTIL HFA, VENTOLIN HFA, PROAIR HFA) 90 mcg/actuation inhaler; Take 1 Puff by inhalation every four (4) hours as needed for Shortness of Breath or Cough. Dispense: 1 Inhaler;  Refill: 0    Follow-up and Dispositions    · Return in about 1 year (around 12/20/2020), or as needed, for medicare wellness visit, ekg.           I have discussed the diagnosis with the patient and the intended plan as seen in the above orders. The patient has received an after-visit summary and questions were answered concerning future plans. I have discussed medication side effects and warnings with the patient as well. I have reviewed the plan of care with the patient, accepted their input and they are in agreement with the treatment goals. Fatou Pruitt MD  December 20, 2019      This is the Subsequent Medicare Annual Wellness Exam, performed 12 months or more after the Initial AWV or the last Subsequent AWV    I have reviewed the patient's medical history in detail and updated the computerized patient record. History     Patient Active Problem List   Diagnosis Code    Urinary, incontinence, stress female N39.3    Advance care planning Z71.89    Pure hypercholesterolemia E78.00    Osteopenia, senile M85.80     Past Medical History:   Diagnosis Date    Indigestion     Osteopenia     Urinary incontinence       Past Surgical History:   Procedure Laterality Date    HX ANKLE FRACTURE TX      HX TUBAL LIGATION      25 years ago     Current Outpatient Medications   Medication Sig Dispense Refill    albuterol (PROVENTIL HFA, VENTOLIN HFA, PROAIR HFA) 90 mcg/actuation inhaler Take 1 Puff by inhalation every four (4) hours as needed for Shortness of Breath or Cough. 1 Inhaler 0    mirabegron ER (MYRBETRIQ) 25 mg ER tablet Take 1 Tab by mouth daily. 90 Tab 3    hydroquinone (ESOTERICA, MELQUIN) 4 % topical cream APPLY TO DARK SPOTS AT BEDTIME  3    acetaminophen (TYLENOL EXTRA STRENGTH) 500 mg tablet Take 2 Tabs by mouth every eight (8) hours as needed for Pain. 20 Tab 0    cholecalciferol (VITAMIN D3) 1,000 unit tablet Take  by mouth daily.  calcium 500 mg Tab Take  by mouth daily.        No Known Allergies    Family History   Problem Relation Age of Onset    Hypertension Father      Social History     Tobacco Use  Smoking status: Never Smoker    Smokeless tobacco: Never Used   Substance Use Topics    Alcohol use: Yes     Alcohol/week: 1.0 standard drinks     Types: 1 Glasses of wine per week     Comment: occasional glass of  wine       Depression Risk Factor Screening:     3 most recent PHQ Screens 12/20/2019   Little interest or pleasure in doing things Not at all   Feeling down, depressed, irritable, or hopeless Not at all   Total Score PHQ 2 0       Alcohol Risk Factor Screening:   Do you average 1 drink per night or more than 7 drinks a week:  No    On any one occasion in the past three months have you have had more than 3 drinks containing alcohol:  No      Functional Ability and Level of Safety:   Hearing: Hearing is good. Activities of Daily Living: The home contains: no safety equipment. Patient does total self care    Ambulation: with no difficulty    Fall Risk:  Fall Risk Assessment, last 12 mths 12/20/2019   Able to walk? Yes   Fall in past 12 months? No       Abuse Screen:  Patient is not abused    Cognitive Screening   Has your family/caregiver stated any concerns about your memory: no  Cognitive Screening: Normal - Verbal Fluency Test    Patient Care Team   Patient Care Team:  Raimundo Young MD as PCP - General (Internal Medicine)  Raimundo Young MD as PCP - [facility] Empaneled Provider  Terrence Bowling MD (Gastroenterology)  Emmy Linton OD (Optometry)    Assessment/Plan   Education and counseling provided:  Are appropriate based on today's review and evaluation  Influenza Vaccine    Diagnoses and all orders for this visit:    1. Pure hypercholesterolemia  -     CBC WITH AUTOMATED DIFF; Future  -     LIPID PANEL; Future  -     METABOLIC PANEL, COMPREHENSIVE; Future  -     TSH AND FREE T4; Future  -     URINALYSIS W/ RFLX MICROSCOPIC; Future    2. Medicare annual wellness visit, subsequent    3.  Screening for alcoholism  -     HI ANNUAL ALCOHOL SCREEN 15 MIN    4. Screening for depression  -     DEPRESSION SCREEN ANNUAL    5. Osteopenia, senile    6. Advance care planning    7. Dyspnea on exertion    8. Cough  -     albuterol (PROVENTIL HFA, VENTOLIN HFA, PROAIR HFA) 90 mcg/actuation inhaler; Take 1 Puff by inhalation every four (4) hours as needed for Shortness of Breath or Cough.     9. Encounter for immunization  -     ADMIN INFLUENZA VIRUS VAC  -     INFLUENZA VACCINE INACTIVATED (IIV), SUBUNIT, ADJUVANTED, IM        Health Maintenance Due   Topic Date Due    Shingrix Vaccine Age 49> (1 of 2) 09/15/1985    GLAUCOMA SCREENING Q2Y  09/15/2000    MEDICARE YEARLY EXAM  01/10/2019    Influenza Age 9 to Adult  08/01/2019

## 2019-12-20 NOTE — PROGRESS NOTES
After obtaining consent, and per orders of Dr. Ramone Park, injection of Influenza Vaccine given by Griselda Young LPN. Patient instructed to remain in clinic for 20 minutes afterwards, and to report any adverse reaction to me immediately. Pt tolerated injection well.

## 2019-12-20 NOTE — ACP (ADVANCE CARE PLANNING)
Advance Care Planning (ACP) Provider Note - Comprehensive     Date of ACP Conversation: 12/20/19  Persons included in Conversation:  patient  Length of ACP Conversation in minutes:  <16 minutes (Non-Billable)    Authorized Decision Maker (if patient is incapable of making informed decisions): This person is:  Healthcare Agent/Medical Power of  under Advance Directive          General ACP for ALL Patients with Decision Making Capacity:   Importance of advance care planning, including choosing a healthcare agent to communicate patient's healthcare decisions if patient lost the ability to make decisions, such as after a sudden illness or accident  Understanding of the healthcare agent role was assessed and information provided  Exploration of values, goals, and preferences if recovery is not expected, even with continued medical treatment in the event of: Imminent death  Severe, permanent brain injury  \"In these circumstances, what matters most to you? \"  Care focused more on comfort or quality of life. Opportunity offered to explore how cultural, Shinto, spiritual, or personal beliefs would affect decisions for future care     For Serious or Chronic Illness:  Understanding of medical condition    Understanding of CPR, goals and expected outcomes, benefits and burdens discussed. Information on CPR success rates provided (e.g. for CPR in hospital, survival to d/c at two weeks is 22%, for chronically ill or elderly/frail survival is less than 3%); Individual asked to communicate understanding of information in his/her own words.   Explored fears and concerns regarding CPR or possible outcomes    Interventions Provided:  Recommended completion of Advance Directive form after review of ACP materials and conversation with prospective healthcare agent   Recommended communicating the plan and making copies for the healthcare agent, personal physician, and others as appropriate (e.g., health system)  Recommended review of completed ACP document annually or upon change in health status

## 2019-12-20 NOTE — PROGRESS NOTES
Chief Complaint   Patient presents with    Annual Wellness Visit    Breathing Problem    Cough     1. Have you been to the ER, urgent care clinic since your last visit? Hospitalized since your last visit? No    2. Have you seen or consulted any other health care providers outside of the [address] [place] [address] since your last visit? Include any pap smears or colon screening.  No  2

## 2019-12-20 NOTE — PATIENT INSTRUCTIONS
Medicare Wellness Visit, Female The best way to live healthy is to have a lifestyle where you eat a well-balanced diet, exercise regularly, limit alcohol use, and quit all forms of tobacco/nicotine, if applicable. Regular preventive services are another way to keep healthy. Preventive services (vaccines, screening tests, monitoring & exams) can help personalize your care plan, which helps you manage your own care. Screening tests can find health problems at the earliest stages, when they are easiest to treat. [facility] follows the current, evidence-based guidelines published by the [facility] ([facility]) when recommending preventive services for our patients. Because we follow these guidelines, sometimes recommendations change over time as research supports it. (For example, mammograms used to be recommended annually. Even though Medicare will still pay for an annual mammogram, the newer guidelines recommend a mammogram every two years for women of average risk). Of course, you and your doctor may decide to screen more often for some diseases, based on your risk and your co-morbidities (chronic disease you are already diagnosed with). Preventive services for you include: - Medicare offers their members a free annual wellness visit, which is time for you and your primary care provider to discuss and plan for your preventive service needs. Take advantage of this benefit every year! 
-All adults over the age of 72 should receive the recommended pneumonia vaccines. Current USPSTF guidelines recommend a series of two vaccines for the best pneumonia protection.  
-All adults should have a flu vaccine yearly and a tetanus vaccine every 10 years.  
-All adults age 48 and older should receive the shingles vaccines (series of two vaccines). -All adults age 38-68 who are overweight should have a diabetes screening test once every three years. -All adults born between 80 and 1965 should be screened once for Hepatitis C. 
-Other screening tests and preventive services for persons with diabetes include: an eye exam to screen for diabetic retinopathy, a kidney function test, a foot exam, and stricter control over your cholesterol.  
-Cardiovascular screening for adults with routine risk involves an electrocardiogram (ECG) at intervals determined by your doctor.  
-Colorectal cancer screenings should be done for adults age 54-65 with no increased risk factors for colorectal cancer. There are a number of acceptable methods of screening for this type of cancer. Each test has its own benefits and drawbacks. Discuss with your doctor what is most appropriate for you during your annual wellness visit. The different tests include: colonoscopy (considered the best screening method), a fecal occult blood test, a fecal DNA test, and sigmoidoscopy. 
 
-A bone mass density test is recommended when a woman turns 65 to screen for osteoporosis. This test is only recommended one time, as a screening. Some providers will use this same test as a disease monitoring tool if you already have osteoporosis. -Breast cancer screenings are recommended every other year for women of normal risk, age 54-69. 
-Cervical cancer screenings for women over age 72 are only recommended with certain risk factors. Here is a list of your current Health Maintenance items (your personalized list of preventive services) with a due date: 
Health Maintenance Due Topic Date Due  Shingles Vaccine (1 of 2) 09/15/1985  Glaucoma Screening   09/15/2000 Yaneth Her Annual Well Visit  01/10/2019  Flu Vaccine  08/01/2019 Medicare Wellness Visit, Female The best way to live healthy is to have a lifestyle where you eat a well-balanced diet, exercise regularly, limit alcohol use, and quit all forms of tobacco/nicotine, if applicable. Regular preventive services are another way to keep healthy. Preventive services (vaccines, screening tests, monitoring & exams) can help personalize your care plan, which helps you manage your own care. Screening tests can find health problems at the earliest stages, when they are easiest to treat. [facility] follows the current, evidence-based guidelines published by the [facility] ([facility]) when recommending preventive services for our patients. Because we follow these guidelines, sometimes recommendations change over time as research supports it. (For example, mammograms used to be recommended annually. Even though Medicare will still pay for an annual mammogram, the newer guidelines recommend a mammogram every two years for women of average risk). Of course, you and your doctor may decide to screen more often for some diseases, based on your risk and your co-morbidities (chronic disease you are already diagnosed with). Preventive services for you include: - Medicare offers their members a free annual wellness visit, which is time for you and your primary care provider to discuss and plan for your preventive service needs. Take advantage of this benefit every year! 
-All adults over the age of 72 should receive the recommended pneumonia vaccines. Current USPSTF guidelines recommend a series of two vaccines for the best pneumonia protection.  
-All adults should have a flu vaccine yearly and a tetanus vaccine every 10 years.  
-All adults age 48 and older should receive the shingles vaccines (series of two vaccines).      
-All adults age 38-68 who are overweight should have a diabetes screening test once every three years.  
-All adults born between 80 and 1965 should be screened once for Hepatitis C. 
-Other screening tests and preventive services for persons with diabetes include: an eye exam to screen for diabetic retinopathy, a kidney function test, a foot exam, and stricter control over your cholesterol.  
-Cardiovascular screening for adults with routine risk involves an electrocardiogram (ECG) at intervals determined by your doctor.  
-Colorectal cancer screenings should be done for adults age 54-65 with no increased risk factors for colorectal cancer. There are a number of acceptable methods of screening for this type of cancer. Each test has its own benefits and drawbacks. Discuss with your doctor what is most appropriate for you during your annual wellness visit. The different tests include: colonoscopy (considered the best screening method), a fecal occult blood test, a fecal DNA test, and sigmoidoscopy. 
 
-A bone mass density test is recommended when a woman turns 65 to screen for osteoporosis. This test is only recommended one time, as a screening. Some providers will use this same test as a disease monitoring tool if you already have osteoporosis. -Breast cancer screenings are recommended every other year for women of normal risk, age 54-69. 
-Cervical cancer screenings for women over age 72 are only recommended with certain risk factors. Here is a list of your current Health Maintenance items (your personalized list of preventive services) with a due date: 
Health Maintenance Due Topic Date Due  Shingles Vaccine (1 of 2) 09/15/1985  Glaucoma Screening   09/15/2000 Kelsy Gisselle Annual Well Visit  01/10/2019  Flu Vaccine  08/01/2019

## 2020-02-25 ENCOUNTER — OFFICE VISIT (OUTPATIENT)
Dept: FAMILY MEDICINE CLINIC | Age: 85
End: 2020-02-25

## 2020-02-25 VITALS
OXYGEN SATURATION: 96 % | SYSTOLIC BLOOD PRESSURE: 129 MMHG | RESPIRATION RATE: 16 BRPM | HEIGHT: 64 IN | WEIGHT: 191 LBS | DIASTOLIC BLOOD PRESSURE: 67 MMHG | HEART RATE: 77 BPM | TEMPERATURE: 97.3 F | BODY MASS INDEX: 32.61 KG/M2

## 2020-02-25 DIAGNOSIS — H92.03 OTALGIA OF BOTH EARS: Primary | ICD-10-CM

## 2020-02-25 DIAGNOSIS — M54.2 NECK PAIN: ICD-10-CM

## 2020-02-25 NOTE — PROGRESS NOTES
History of Present Illness  Alice Pack is a 80 y.o. female who presents today for management of    Chief Complaint   Patient presents with    Ear Pain     patient c/o bilateral ear pain that causes head pain onset 1 month ago    Urinary Frequency       Patient complains of pain on her neck, jaw and both ears. Onset was few months ago. It has been gradually improving. She went ENT few weeks ago and was told that her ears looked fine. She has history of hearing loss, wears hearing aids. No associated tinnitus or ear discharge. Problem List  Patient Active Problem List    Diagnosis Date Noted    Pure hypercholesterolemia 01/09/2018    Osteopenia, senile 01/09/2018    Urinary, incontinence, stress female 12/12/2017    Advance care planning 12/12/2017       Past Medical History  Past Medical History:   Diagnosis Date    Indigestion     Mixed incontinence     OAB (overactive bladder)     Osteopenia     Urinary incontinence         Surgical History  Past Surgical History:   Procedure Laterality Date    HX ANKLE FRACTURE TX      HX TUBAL LIGATION      25 years ago        Current Medications  Current Outpatient Medications   Medication Sig    cholecalciferol (VITAMIN D3) 1,000 unit tablet Take  by mouth daily.  calcium 500 mg Tab Take  by mouth daily.  albuterol (PROVENTIL HFA, VENTOLIN HFA, PROAIR HFA) 90 mcg/actuation inhaler Take 1 Puff by inhalation every four (4) hours as needed for Shortness of Breath or Cough.  mirabegron ER (MYRBETRIQ) 25 mg ER tablet Take 1 Tab by mouth daily.  hydroquinone (ESOTERICA, MELQUIN) 4 % topical cream APPLY TO DARK SPOTS AT BEDTIME    acetaminophen (TYLENOL EXTRA STRENGTH) 500 mg tablet Take 2 Tabs by mouth every eight (8) hours as needed for Pain. No current facility-administered medications for this visit.         Allergies/Drug Reactions  No Known Allergies     Family History  Family History   Problem Relation Age of Onset    Hypertension Father Social History  Social History     Socioeconomic History    Marital status:      Spouse name: Not on file    Number of children: Not on file    Years of education: Not on file    Highest education level: Not on file   Occupational History    Not on file   Social Needs    Financial resource strain: Not on file    Food insecurity:     Worry: Not on file     Inability: Not on file    Transportation needs:     Medical: Not on file     Non-medical: Not on file   Tobacco Use    Smoking status: Never Smoker    Smokeless tobacco: Never Used   Substance and Sexual Activity    Alcohol use: Yes     Alcohol/week: 1.0 standard drinks     Types: 1 Glasses of wine per week     Comment: occasional glass of  wine    Drug use: No    Sexual activity: Never   Lifestyle    Physical activity:     Days per week: Not on file     Minutes per session: Not on file    Stress: Not on file   Relationships    Social connections:     Talks on phone: Not on file     Gets together: Not on file     Attends Taoism service: Not on file     Active member of club or organization: Not on file     Attends meetings of clubs or organizations: Not on file     Relationship status: Not on file    Intimate partner violence:     Fear of current or ex partner: Not on file     Emotionally abused: Not on file     Physically abused: Not on file     Forced sexual activity: Not on file   Other Topics Concern    Not on file   Social History Narrative    Not on file       Review of Systems  Review of Systems   Constitutional: Negative. HENT: Positive for ear pain. Respiratory: Negative. Cardiovascular: Negative. Gastrointestinal: Negative. Genitourinary: Negative. Musculoskeletal: Positive for neck pain. Neurological: Negative. Psychiatric/Behavioral: Negative.         Physical Exam  Vital signs:   Vitals:    02/25/20 1000   BP: 129/67   Pulse: 77   Resp: 16   Temp: 97.3 °F (36.3 °C)   TempSrc: Oral   SpO2: 96% Weight: 191 lb (86.6 kg)   Height: 5' 4\" (1.626 m)       General: alert, oriented, not in distress  HEENT:  Head: Normocephalic. Right Ear: External ear normal. Patent ear canal, TM intact. Left Ear: External ear normal. Patent ear canal, TM intact. Nose: Nose normal.   Mouth/Throat: Oropharynx is clear and moist. No oropharyngeal exudate. Eyes: Conjunctivae and EOM are normal. No scleral icterus. Neck: Normal range of motion. Neck supple. No thyromegaly present. Lymphadenopathy: no cervical adenopathy. Chest/Lungs: clear breath sounds, no wheezing or crackles  Heart: normal rate, regular rhythm, no murmur  Extremities: no focal deformities, no edema    Laboratory/Tests:      Assessment/Plan:      ICD-10-CM ICD-9-CM    1. Otalgia of both ears H92.03 388.70    2. Neck pain M54.2 723.1      Possible TMJ  Provided educational material regarding TMJ disorder      Follow-up and Dispositions    · Return if symptoms worsen or fail to improve. I have discussed the diagnosis with the patient and the intended plan as seen in the above orders. The patient has received an after-visit summary and questions were answered concerning future plans. I have discussed medication side effects and warnings with the patient as well. I have reviewed the plan of care with the patient, accepted their input and they are in agreement with the treatment goals.        Kathrine Roach MD  February 25, 2020

## 2020-02-25 NOTE — PROGRESS NOTES
Scott Allison presents today for   Chief Complaint   Patient presents with    Ear Pain     patient c/o bilateral ear pain that causes head pain onset 1 month ago    Urinary Frequency       Is someone accompanying this pt? no    Is the patient using any DME equipment during OV? no    Depression Screening:  3 most recent PHQ Screens 2/25/2020   Little interest or pleasure in doing things Not at all   Feeling down, depressed, irritable, or hopeless Not at all   Total Score PHQ 2 0       Learning Assessment:  Learning Assessment 2/25/2020   PRIMARY LEARNER Patient   HIGHEST LEVEL OF EDUCATION - PRIMARY LEARNER  [facility] [address] PRIMARY LEARNER HEARING   CO-LEARNER CAREGIVER No   PRIMARY LANGUAGE ENGLISH   LEARNER PREFERENCE PRIMARY VIDEOS   ANSWERED BY patient   RELATIONSHIP SELF       Abuse Screening:  Abuse Screening Questionnaire 2/25/2020   Do you ever feel afraid of your partner? N   Are you in a relationship with someone who physically or mentally threatens you? N   Is it safe for you to go home? Y       Fall Risk  Fall Risk Assessment, last 12 mths 12/20/2019   Able to walk? Yes   Fall in past 12 months? No         Health Maintenance reviewed and discussed and ordered per Provider. Health Maintenance Due   Topic Date Due    Shingrix Vaccine Age 49> (1 of 2) 09/15/1985    GLAUCOMA SCREENING Q2Y  09/15/2000   . Coordination of Care:  1. Have you been to the ER, urgent care clinic since your last visit? Hospitalized since your last visit? no    2. Have you seen or consulted any other health care providers outside of the [address] [place] [address] since your last visit? Include any pap smears or colon screening.  no

## 2020-02-25 NOTE — PATIENT INSTRUCTIONS
Temporomandibular Disorder: Care Instructions Your Care Instructions Temporomandibular (TM) disorders are a problem with the muscles and joints that connect your jaw to your skull. They cause pain when you open your mouth, chew, or yawn. You may feel this pain on one or both sides. TM disorders are often caused by tight jaw muscles. The tightness can be caused by clenching or grinding your teeth. This may happen when you have a lot of stress in your life. If you lower your stress, you may be able to stop clenching or grinding your teeth. This will help relax your jaw and reduce your pain. You may also be able to do some things at home to feel better. But if none of this works, your doctor may prescribe medicine to help relax your muscles and control the pain. Follow-up care is a key part of your treatment and safety. Be sure to make and go to all appointments, and call your doctor if you are having problems. It's also a good idea to know your test results and keep a list of the medicines you take. How can you care for yourself at home? · Put a warm, moist cloth or heating pad set on low on your jaw. Do this for 10 to 20 minutes at a time. Put a thin cloth between the heating pad and your skin. · Avoid hard or chewy foods that cause your jaws to work very hard. Examples include popcorn, jerky, tough meats, chewy breads, gum, and raw apples and carrots. · Choose softer foods that are easy to chew. These include eggs, yogurt, and soup. · Cut your food into small pieces. Chew slowly. · If your jaw gets too painful to chew, or if it locks, you may need to puree your food for a few days or weeks. · To relax your jaw, repeat this exercise for a few minutes every morning and evening. Watch yourself in a mirror. Gently open and close your mouth. Move your jaw straight up and down. But don't do this if it makes your pain worse.  
· Get at least 30 minutes of exercise on most days of the week to relieve stress. Walking is a good choice. You also may want to do other activities, such as running, swimming, cycling, or playing tennis or team sports. · Do not: 
? Hold a phone between your shoulder and your jaw. ? Open your mouth all the way, like when you sing loudly or yawn. ? Clench or grind your teeth, bite your lips, or chew your fingernails. ? Clench things such as pens, pipes, or cigars between your teeth. When should you call for help? Call your doctor now or seek immediate medical care if: 
  · Your jaw is locked open or shut or it is hard to move your jaw.  
 Watch closely for changes in your health, and be sure to contact your doctor if: 
  · Your jaw pain gets worse.  
  · Your face is swollen.  
  · You do not get better as expected. Where can you learn more? Go to http://bailey-luisa.info/. Enter H158 in the search box to learn more about \"Temporomandibular Disorder: Care Instructions. \" Current as of: October 3, 2018 Content Version: 12.2 © 8592-4610 [employer]. Care instructions adapted under license by [employer] (which disclaims liability or warranty for this information). If you have questions about a medical condition or this instruction, always ask your healthcare professional. [address] any warranty or liability for your use of this information.

## 2020-02-29 ENCOUNTER — TELEPHONE (OUTPATIENT)
Dept: INTERNAL MEDICINE CLINIC | Age: 85
End: 2020-02-29

## 2020-02-29 DIAGNOSIS — R42 DIZZINESS: Primary | ICD-10-CM

## 2020-02-29 RX ORDER — MECLIZINE HCL 12.5 MG 12.5 MG/1
12.5 TABLET ORAL
Qty: 30 TAB | Refills: 0 | Status: SHIPPED | OUTPATIENT
Start: 2020-02-29 | End: 2020-03-02 | Stop reason: SDUPTHER

## 2020-03-01 NOTE — TELEPHONE ENCOUNTER
Received call from patient. She stated she was feeling a little dizzy with some nausea today while performing some cleaning of her room. She denied any fever, chills, chest pain, SOB, ear pain, sore throat, or neurological sxs. Her bowels are OK. She has had no vomiting. She stated she had an episode of vertigo a few years ago and this was mildly like that. She stated she was given medication then that helped but she does not have any left. Would like a refill.

## 2020-03-02 RX ORDER — MECLIZINE HCL 12.5 MG 12.5 MG/1
12.5 TABLET ORAL
Qty: 30 TAB | Refills: 0 | Status: SHIPPED | OUTPATIENT
Start: 2020-03-02 | End: 2022-05-31 | Stop reason: SDUPTHER

## 2020-04-20 ENCOUNTER — TELEPHONE (OUTPATIENT)
Dept: FAMILY MEDICINE CLINIC | Age: 85
End: 2020-04-20

## 2020-04-20 NOTE — TELEPHONE ENCOUNTER
Pt. Is having pain below her right knee. She think she may have pulled a muscle and is requesting a prescription to be sent. Please advise.

## 2020-04-23 ENCOUNTER — TELEPHONE (OUTPATIENT)
Dept: FAMILY MEDICINE CLINIC | Age: 85
End: 2020-04-23

## 2020-04-23 NOTE — TELEPHONE ENCOUNTER
Pt return nurse Cuevas's phone call. Pt declined to make an appointment as directed by nurse cuevas. Pt stated that she will just call when she needs an appointment.

## 2020-05-04 ENCOUNTER — TELEPHONE (OUTPATIENT)
Dept: FAMILY MEDICINE CLINIC | Age: 85
End: 2020-05-04

## 2020-05-04 NOTE — TELEPHONE ENCOUNTER
Patient requesting a telephone only visits. Declined to make any type of virtual appointments. Pt would like to be seen for thyroid issue. Asking to be called back.

## 2020-05-06 NOTE — TELEPHONE ENCOUNTER
Pt. Ana Stein she got a \"line screening\" done about a month ago that she stated comes into the city to do blood work and screenings and they told her that she had a thyroid issue, so she started taking selenium and iodine on her own because she looked online to see what could help her thyroid issue. Pt. Stated her head and neck was hurting, but is not hurting anymore Pt. Made an appt.  On 06/02/2020 at 7:30am

## 2020-05-06 NOTE — TELEPHONE ENCOUNTER
Lvm to return call, more information is needed. At this time please schedule patient for telephone visit, if matter is not urgent please schedule in June.

## 2020-06-02 ENCOUNTER — OFFICE VISIT (OUTPATIENT)
Dept: FAMILY MEDICINE CLINIC | Age: 85
End: 2020-06-02

## 2020-06-02 ENCOUNTER — HOSPITAL ENCOUNTER (OUTPATIENT)
Dept: LAB | Age: 85
Discharge: HOME | End: 2020-06-02
Payer: MEDICARE

## 2020-06-02 VITALS
SYSTOLIC BLOOD PRESSURE: 105 MMHG | HEART RATE: 88 BPM | DIASTOLIC BLOOD PRESSURE: 68 MMHG | HEIGHT: 64 IN | WEIGHT: 191.8 LBS | BODY MASS INDEX: 32.74 KG/M2 | TEMPERATURE: 98.2 F | RESPIRATION RATE: 18 BRPM | OXYGEN SATURATION: 100 %

## 2020-06-02 DIAGNOSIS — R94.6 ABNORMAL FINDING ON THYROID FUNCTION TEST: Primary | ICD-10-CM

## 2020-06-02 DIAGNOSIS — M25.562 RECURRENT PAIN OF LEFT KNEE: ICD-10-CM

## 2020-06-02 DIAGNOSIS — R94.6 ABNORMAL FINDING ON THYROID FUNCTION TEST: ICD-10-CM

## 2020-06-02 LAB
T4 FREE SERPL-MCNC: 1 NG/DL (ref 0.7–1.5)
TSH SERPL DL<=0.05 MIU/L-ACNC: 1.23 UIU/ML (ref 0.36–3.74)

## 2020-06-02 PROCEDURE — 36415 COLL VENOUS BLD VENIPUNCTURE: CPT

## 2020-06-02 PROCEDURE — 84439 ASSAY OF FREE THYROXINE: CPT

## 2020-06-02 RX ORDER — TROSPIUM CHLORIDE 20 MG/1
20 TABLET, FILM COATED ORAL
COMMUNITY
End: 2020-07-30

## 2020-06-02 RX ORDER — CYANOCOBALAMIN (VITAMIN B-12) 1000 MCG
TABLET, EXTENDED RELEASE ORAL
COMMUNITY
End: 2022-02-14

## 2020-06-02 NOTE — PROGRESS NOTES
Susie Ferrell presents today for left knee pain, thyroid  - Is someone accompanying this pt? no  - Is the patient using any durable medical equipment during office visit? no    Coordination of Care:  1. Have you been to the ER, urgent care clinic since your last visit? Hospitalized since your last visit? no    2. Have you seen or consulted any other health care providers outside of the [address] [place] [address] since your last visit? Include any pap smears or colon screening. No    Depression Screening:  3 most recent PHQ Screens 2/25/2020   Little interest or pleasure in doing things Not at all   Feeling down, depressed, irritable, or hopeless Not at all   Total Score PHQ 2 0       Learning Assessment:  Learning Assessment 2/25/2020   PRIMARY LEARNER Patient   HIGHEST LEVEL OF EDUCATION - PRIMARY LEARNER  [facility] [address] PRIMARY LEARNER HEARING   CO-LEARNER CAREGIVER No   PRIMARY LANGUAGE ENGLISH   LEARNER PREFERENCE PRIMARY VIDEOS   ANSWERED BY patient   RELATIONSHIP SELF       Abuse Screening:  Abuse Screening Questionnaire 2/25/2020   Do you ever feel afraid of your partner? N   Are you in a relationship with someone who physically or mentally threatens you? N   Is it safe for you to go home? Y       Fall Risk  Fall Risk Assessment, last 12 mths 12/20/2019   Able to walk? Yes   Fall in past 12 months? No       Health Maintenance reviewed and discussed and ordered per Provider.   Health Maintenance Due   Topic Date Due    Shingrix Vaccine Age 49> (1 of 2) 09/15/1985    GLAUCOMA SCREENING Q2Y  09/15/2000

## 2020-06-02 NOTE — PROGRESS NOTES
History of Present Illness  Susie Ferrell is a 80 y.o. female who presents today for management of    Chief Complaint   Patient presents with    Knee Pain    Thyroid Problem       Knee Pain  Patient complains of left knee pain. Onset of the symptoms was 3 months ago. She does not have any pain today. Inciting event: none known. Current symptoms include none. Associated symptoms: none. Aggravating symptoms: standing. Patient's overall course: intermittent Patient has had no prior knee problems. Evaluation to date: none. Treatment to date: OTC analgesics: effective. Patient underwent routine testing with an independent organization and was told that she had an abnormal thyroid test. She denies fatigue, weight gain, constipation, cold intolerance, dysphagia. Problem List  Patient Active Problem List    Diagnosis Date Noted    Pure hypercholesterolemia 01/09/2018    Osteopenia, senile 01/09/2018    Urinary, incontinence, stress female 12/12/2017    Advance care planning 12/12/2017       Current Medications  Current Outpatient Medications   Medication Sig    trospium (SANCTURA) 20 mg tablet Take 20 mg by mouth Before breakfast, lunch, and dinner.  IODINE PO Take 500 mg by mouth.  selenium 200 mcg cap Take  by mouth.  OTHER CBD caps and cream    albuterol (PROVENTIL HFA, VENTOLIN HFA, PROAIR HFA) 90 mcg/actuation inhaler Take 1 Puff by inhalation every four (4) hours as needed for Shortness of Breath or Cough.  cholecalciferol (VITAMIN D3) 1,000 unit tablet Take  by mouth daily.  calcium 500 mg Tab Take  by mouth daily. No current facility-administered medications for this visit. Allergies/Drug Reactions  Allergies   Allergen Reactions    Myrbetriq [Mirabegron] Other (comments)     Headache and dizziness         Review of Systems  Review of Systems   Constitutional: Negative. Eyes: Negative. Respiratory: Negative. Cardiovascular: Negative. Gastrointestinal: Negative. Genitourinary: Negative. Musculoskeletal: Negative. Neurological: Negative. Physical Exam  Vital signs:   Vitals:    06/02/20 0919   BP: 105/68   Pulse: 88   Resp: 18   Temp: 98.2 °F (36.8 °C)   TempSrc: Oral   SpO2: 100%   Weight: 191 lb 12.8 oz (87 kg)   Height: 5' 4\" (1.626 m)       General: alert, oriented, not in distress  Head: scalp normal, atraumatic  Eyes: pupils are equal and reactive, full and intact EOM's  Neck: supple, no JVD, no lymphadenopathy, non-palpable thyroid  Extremities: no focal deformities, no edema  Skin: no active skin lesions      Laboratory/Tests:  Component      Latest Ref Rng & Units 12/20/2019 12/20/2019 12/20/2019 12/20/2019           9:56 AM  9:56 AM  9:56 AM  9:56 AM   WBC      4.6 - 13.2 K/uL    7.0   RBC      4.20 - 5.30 M/uL    4.74   HGB      12.0 - 16.0 g/dL    13.0   HCT      35.0 - 45.0 %    40.8   MCV      74.0 - 97.0 FL    86.1   MCH      24.0 - 34.0 PG    27.4   MCHC      31.0 - 37.0 g/dL    31.9   RDW      11.6 - 14.5 %    13.8   PLATELET      248 - 531 K/uL    224   MPV      9.2 - 11.8 FL    10.1   NEUTROPHILS      40 - 73 %    69   LYMPHOCYTES      21 - 52 %    21   MONOCYTES      3 - 10 %    8   EOSINOPHILS      0 - 5 %    2   BASOPHILS      0 - 2 %    0   ABS. NEUTROPHILS      1.8 - 8.0 K/UL    4.9   ABS. LYMPHOCYTES      0.9 - 3.6 K/UL    1.5   ABS. MONOCYTES      0.05 - 1.2 K/UL    0.5   ABS. EOSINOPHILS      0.0 - 0.4 K/UL    0.1   ABS.  BASOPHILS      0.0 - 0.1 K/UL    0.0   DF          AUTOMATED   Sodium      136 - 145 mmol/L   141    Potassium      3.5 - 5.5 mmol/L   3.8    Chloride      100 - 111 mmol/L   105    CO2      21 - 32 mmol/L   32    Anion gap      3.0 - 18 mmol/L   4    Glucose      74 - 99 mg/dL   119 (H)    BUN      7.0 - 18 MG/DL   10    Creatinine      0.6 - 1.3 MG/DL   0.69    BUN/Creatinine ratio      12 - 20     14    GFR est AA      >60 ml/min/1.73m2   >60    GFR est non-AA      >60 ml/min/1.73m2   >60    Calcium      8.5 - 10.1 MG/DL   8.8    Bilirubin, total      0.2 - 1.0 MG/DL   0.7    ALT      13 - 56 U/L   19    AST      10 - 38 U/L   17    Alk. phosphatase      45 - 117 U/L   84    Protein, total      6.4 - 8.2 g/dL   7.0    Albumin      3.4 - 5.0 g/dL   3.6    Globulin      2.0 - 4.0 g/dL   3.4    A-G Ratio      0.8 - 1.7     1.1    Cholesterol, total      <200 MG/DL  210 (H)     Triglyceride      <150 MG/DL  78     HDL Cholesterol      40 - 60 MG/DL  80 (H)     LDL, calculated      0 - 100 MG/DL  114.4 (H)     VLDL, calculated      MG/DL  15.6     CHOL/HDL Ratio      0 - 5.0    2.6     TSH      0.36 - 3.74 uIU/mL 1.02      T4, Free      0.7 - 1.5 NG/DL 1.0          Assessment/Plan:      1. Abnormal finding on thyroid function test  - TSH AND FREE T4; Future    2. Recurrent pain of left knee  - suspect osteoarthritis  - Tylenol PRN  - ice, rest  Follow-up and Dispositions    · Return in about 6 months (around 12/2/2020) for ROV. I have discussed the diagnosis with the patient and the intended plan as seen in the above orders. The patient has received an after-visit summary and questions were answered concerning future plans. I have discussed medication side effects and warnings with the patient as well. I have reviewed the plan of care with the patient, accepted their input and they are in agreement with the treatment goals.        Johanne Gallo MD  June 2, 2020

## 2020-06-04 ENCOUNTER — TELEPHONE (OUTPATIENT)
Dept: FAMILY MEDICINE CLINIC | Age: 85
End: 2020-06-04

## 2020-06-04 NOTE — TELEPHONE ENCOUNTER
----- Message from Beverly Martinez MD sent at 6/3/2020  9:51 AM EDT ----- Thyroid function test normal. Please inform patient.

## 2020-06-04 NOTE — TELEPHONE ENCOUNTER
Called and spoke with patient, informed her that thyroid function test is normal. She verbalized understanding.

## 2020-09-08 ENCOUNTER — OFFICE VISIT (OUTPATIENT)
Dept: FAMILY MEDICINE CLINIC | Age: 85
End: 2020-09-08

## 2020-09-08 VITALS
OXYGEN SATURATION: 99 % | RESPIRATION RATE: 16 BRPM | BODY MASS INDEX: 33.29 KG/M2 | DIASTOLIC BLOOD PRESSURE: 71 MMHG | HEART RATE: 72 BPM | HEIGHT: 64 IN | TEMPERATURE: 96.2 F | WEIGHT: 195 LBS | SYSTOLIC BLOOD PRESSURE: 124 MMHG

## 2020-09-08 DIAGNOSIS — M25.476 SOFT TISSUE SWELLING OF JOINT OF FOOT: ICD-10-CM

## 2020-09-08 DIAGNOSIS — S91.302A WOUND OF LEFT FOOT: Primary | ICD-10-CM

## 2020-09-08 DIAGNOSIS — Z23 NEEDS FLU SHOT: ICD-10-CM

## 2020-09-08 DIAGNOSIS — R51.9 HEADACHE DISORDER: ICD-10-CM

## 2020-09-08 DIAGNOSIS — Z23 ENCOUNTER FOR IMMUNIZATION: ICD-10-CM

## 2020-09-08 NOTE — PROGRESS NOTES
History of Present Illness  Elisa Mendieta is a 80 y.o. female who presents today for management of    Chief Complaint   Patient presents with   [facility] ED Follow-up       Patient presents today with left foot pain and  swelling. Patient was seen and evaluated at [address] Imaging of her chest, abdomen, pelvis, left ankle, left foot and left hip did not show any injuries except for mild soft tissue swelling on her left ankle. Patient complains of frequent headaches. Pain localized to the right neck and right head. Pain is described as throbbing, 5/10 in character. Denies dizziness, changes in vision, numbness or weakness. Tylenol provides relief. Problem List  Patient Active Problem List    Diagnosis Date Noted    Pure hypercholesterolemia 01/09/2018    Osteopenia, senile 01/09/2018    Urinary, incontinence, stress female 12/12/2017    Advance care planning 12/12/2017       Current Medications  Current Outpatient Medications   Medication Sig    trospium (SANCTURA XL) 60 mg capsule Take 1 Cap by mouth Daily (before breakfast).  IODINE PO Take 500 mg by mouth.  selenium 200 mcg cap Take  by mouth.  OTHER CBD caps and cream    albuterol (PROVENTIL HFA, VENTOLIN HFA, PROAIR HFA) 90 mcg/actuation inhaler Take 1 Puff by inhalation every four (4) hours as needed for Shortness of Breath or Cough.  cholecalciferol (VITAMIN D3) 1,000 unit tablet Take  by mouth daily.  calcium 500 mg Tab Take  by mouth daily. No current facility-administered medications for this visit. Allergies/Drug Reactions  Allergies   Allergen Reactions    Myrbetriq [Mirabegron] Other (comments)     Headache and dizziness         Review of Systems  Review of Systems   Constitutional: Negative for chills, fever, malaise/fatigue and weight loss. Cardiovascular: Positive for leg swelling. Negative for chest pain and palpitations. Gastrointestinal: Negative for abdominal pain, heartburn, nausea and vomiting. Musculoskeletal: Positive for joint pain. Neurological: Positive for headaches. Negative for dizziness. Physical Exam  Vital signs:   Vitals:    09/08/20 1356   BP: 124/71   Pulse: 72   Resp: 16   Temp: (!) 96.2 °F (35.7 °C)   TempSrc: Oral   SpO2: 99%   Weight: 195 lb (88.5 kg)   Height: 5' 4\" (1.626 m)       General: alert, oriented, not in distress  Head: scalp normal, atraumatic  Eyes: pupils are equal and reactive, full and intact EOM's  Neck: supple, no JVD, no lymphadenopathy, non-palpable thyroid  Heart: normal rate, regular rhythm, no murmur  Extremities: ulcer on the dorsal aspect of left foot    Laboratory/Tests:  CT CHEST/ABD/PELVIS W/ CONTRAST8/27/2020  [facility] [employer]  Result Impression     1. No evidence of traumatic injury to the chest, abdomen or pelvis. 2. Evidence of remote granulomatous disease in the chest. 2 mm noncalcified nodule in the right upper lobe, may consider CT chest follow-up in 12 months. 3. Small hiatal hernia. XR - ANKLE COMPLETE LT8/27/2020  [facility] [employer]  Result Impression     1.  No acute fracture or subluxation. 2.  Soft tissue swelling. XR - FOOT COMPLETE LT8/27/2020  [employer]  Result Impression     No significant abnormality.                       XR - EXAM HIP UNILAT,2-3 VIEWS,LT8/27/2020  [facility] [employer]  Result Impression     No significant abnormality. Assessment/Plan:        ICD-10-CM ICD-9-CM    1. Wound of left foot  S91.302A 892.0    2. Soft tissue swelling of joint of foot  M25.476 719.07    3. Headache disorder  R51 784.0      Daily wound cleaning and dressing  Tylenol as needed for pain  Leg elevation  Start a headache diary    Follow-up and Dispositions    · Return in about 2 weeks (around 9/22/2020) for follow-up F2F. I have discussed the diagnosis with the patient and the intended plan as seen in the above orders.   The patient has received an after-visit summary and questions were answered concerning future plans.  I have discussed medication side effects and warnings with the patient as well. I have reviewed the plan of care with the patient, accepted their input and they are in agreement with the treatment goals.        Maddie Turner MD  September 8, 2020
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
## 2020-09-22 ENCOUNTER — OFFICE VISIT (OUTPATIENT)
Dept: FAMILY MEDICINE CLINIC | Age: 85
End: 2020-09-22
Payer: MEDICARE

## 2020-09-22 VITALS
DIASTOLIC BLOOD PRESSURE: 76 MMHG | BODY MASS INDEX: 32.95 KG/M2 | TEMPERATURE: 97.2 F | SYSTOLIC BLOOD PRESSURE: 117 MMHG | HEART RATE: 78 BPM | WEIGHT: 193 LBS | HEIGHT: 64 IN | RESPIRATION RATE: 18 BRPM | OXYGEN SATURATION: 98 %

## 2020-09-22 DIAGNOSIS — S91.302A WOUND OF LEFT FOOT: Primary | ICD-10-CM

## 2020-09-22 PROCEDURE — 1101F PT FALLS ASSESS-DOCD LE1/YR: CPT | Performed by: INTERNAL MEDICINE

## 2020-09-22 PROCEDURE — 1090F PRES/ABSN URINE INCON ASSESS: CPT | Performed by: INTERNAL MEDICINE

## 2020-09-22 PROCEDURE — 99214 OFFICE O/P EST MOD 30 MIN: CPT | Performed by: INTERNAL MEDICINE

## 2020-09-22 PROCEDURE — G8432 DEP SCR NOT DOC, RNG: HCPCS | Performed by: INTERNAL MEDICINE

## 2020-09-22 PROCEDURE — G8417 CALC BMI ABV UP PARAM F/U: HCPCS | Performed by: INTERNAL MEDICINE

## 2020-09-22 PROCEDURE — G8399 PT W/DXA RESULTS DOCUMENT: HCPCS | Performed by: INTERNAL MEDICINE

## 2020-09-22 PROCEDURE — G8427 DOCREV CUR MEDS BY ELIG CLIN: HCPCS | Performed by: INTERNAL MEDICINE

## 2020-09-22 PROCEDURE — G8536 NO DOC ELDER MAL SCRN: HCPCS | Performed by: INTERNAL MEDICINE
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
## 2020-09-29 ENCOUNTER — TELEPHONE (OUTPATIENT)
Dept: FAMILY MEDICINE CLINIC | Age: 85
End: 2020-09-29

## 2020-09-29 DIAGNOSIS — R20.0 NUMBNESS OF LEFT FOOT: Primary | ICD-10-CM

## 2020-09-29 NOTE — TELEPHONE ENCOUNTER
Pt called in and informed me that she was wanting us to send a referral to a Neurology specialist and she couldn't tell me the name of the practice but she gave me the fax number which is [phone] and she said the nurse from there said we needed to send them the referral and the office notes. Please advise.

## 2020-10-01 NOTE — TELEPHONE ENCOUNTER
Lvm to return call. More information is needed. Reason for referral? Please verify neurology office name?
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
## 2020-10-20 ENCOUNTER — OFFICE VISIT (OUTPATIENT)
Dept: FAMILY MEDICINE CLINIC | Age: 85
End: 2020-10-20
Payer: MEDICARE

## 2020-10-20 VITALS
HEART RATE: 71 BPM | RESPIRATION RATE: 16 BRPM | BODY MASS INDEX: 33.63 KG/M2 | HEIGHT: 64 IN | WEIGHT: 197 LBS | OXYGEN SATURATION: 98 % | TEMPERATURE: 97.4 F | SYSTOLIC BLOOD PRESSURE: 116 MMHG | DIASTOLIC BLOOD PRESSURE: 65 MMHG

## 2020-10-20 DIAGNOSIS — Z23 NEED FOR PROPHYLACTIC VACCINATION AGAINST STREPTOCOCCUS PNEUMONIAE (PNEUMOCOCCUS) AND INFLUENZA: ICD-10-CM

## 2020-10-20 DIAGNOSIS — Z23 ENCOUNTER FOR IMMUNIZATION: ICD-10-CM

## 2020-10-20 DIAGNOSIS — R20.0 NUMBNESS OF LEFT FOOT: Primary | ICD-10-CM

## 2020-10-20 PROCEDURE — G8536 NO DOC ELDER MAL SCRN: HCPCS | Performed by: INTERNAL MEDICINE

## 2020-10-20 PROCEDURE — G8417 CALC BMI ABV UP PARAM F/U: HCPCS | Performed by: INTERNAL MEDICINE

## 2020-10-20 PROCEDURE — 1101F PT FALLS ASSESS-DOCD LE1/YR: CPT | Performed by: INTERNAL MEDICINE

## 2020-10-20 PROCEDURE — 99212 OFFICE O/P EST SF 10 MIN: CPT | Performed by: INTERNAL MEDICINE

## 2020-10-20 PROCEDURE — G8432 DEP SCR NOT DOC, RNG: HCPCS | Performed by: INTERNAL MEDICINE

## 2020-10-20 PROCEDURE — 1090F PRES/ABSN URINE INCON ASSESS: CPT | Performed by: INTERNAL MEDICINE

## 2020-10-20 PROCEDURE — G8427 DOCREV CUR MEDS BY ELIG CLIN: HCPCS | Performed by: INTERNAL MEDICINE

## 2020-10-20 PROCEDURE — G8399 PT W/DXA RESULTS DOCUMENT: HCPCS | Performed by: INTERNAL MEDICINE
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
## 2020-11-17 ENCOUNTER — TELEPHONE (OUTPATIENT)
Dept: FAMILY MEDICINE CLINIC | Age: 85
End: 2020-11-17

## 2020-12-15 ENCOUNTER — CLINICAL SUPPORT (OUTPATIENT)
Dept: FAMILY MEDICINE CLINIC | Age: 85
End: 2020-12-15
Payer: MEDICARE

## 2020-12-15 DIAGNOSIS — Z11.1 SCREENING EXAMINATION FOR PULMONARY TUBERCULOSIS: ICD-10-CM

## 2020-12-15 DIAGNOSIS — Z23 ENCOUNTER FOR IMMUNIZATION: ICD-10-CM

## 2020-12-15 PROCEDURE — 90732 PPSV23 VACC 2 YRS+ SUBQ/IM: CPT | Performed by: INTERNAL MEDICINE

## 2020-12-15 PROCEDURE — 86580 TB INTRADERMAL TEST: CPT | Performed by: INTERNAL MEDICINE

## 2020-12-15 PROCEDURE — G0009 ADMIN PNEUMOCOCCAL VACCINE: HCPCS | Performed by: INTERNAL MEDICINE

## 2020-12-15 NOTE — PROGRESS NOTES
Ellen Holguin presents today for PPD testing for work, Pneumovax 23- patient already had flu shot in sept according to our records   - Is someone accompanying this pt? no  - Is the patient using any durable medical equipment during office visit? no    Coordination of Care:  1. Have you been to the ER, urgent care clinic since your last visit? Hospitalized since your last visit? no    2. Have you seen or consulted any other health care providers outside of the [address] [place] [address] since your last visit? Include any pap smears or colon screening. No    PPD Placement note  Ellen Holguin, 80 y.o. female is here today for placement of PPD test  Reason for PPD test: Work  Pt taken PPD test before: yes  Verified in allergy area and with patient that they are not allergic to the products PPD is made of (Phenol or Tween). Yes  Is patient taking any oral or IV steroid medication now or have they taken it in the last month? no  Has the patient ever received the BCG vaccine?: no  Has the patient been in recent contact with anyone known or suspected of having active TB disease?: no       Date of exposure (if applicable): NA       Name of person they were exposed to (if applicable): NA  Patient's Country of origin?: [place]  O: Alert and oriented in NAD. P:  PPD placed on 12/15/2020. Patient advised to return for reading within 48-72 hours.

## 2020-12-17 ENCOUNTER — CLINICAL SUPPORT (OUTPATIENT)
Dept: FAMILY MEDICINE CLINIC | Age: 85
End: 2020-12-17

## 2020-12-17 DIAGNOSIS — Z11.1 ENCOUNTER FOR PPD SKIN TEST READING: Primary | ICD-10-CM

## 2020-12-17 LAB
MM INDURATION POC: 0 MM (ref 0–5)
PPD POC: NEGATIVE NEGATIVE

## 2020-12-17 NOTE — PROGRESS NOTES
Eusebio Mansfield presents today for ppd reading   - Is someone accompanying this pt? no  - Is the patient using any durable medical equipment during office visit? no    Coordination of Care:  1. Have you been to the ER, urgent care clinic since your last visit? Hospitalized since your last visit? no    2. Have you seen or consulted any other health care providers outside of the [address] [place] [address] since your last visit? Include any pap smears or colon screening. No    PPD Reading Note  PPD read and results entered in [employer]. Result: 0 mm induration.   Interpretation: negative  If test not read within 48-72 hours of initial placement, patient advised to repeat in other arm 1-3 weeks after this test.  Allergic reaction: no

## 2021-03-15 ENCOUNTER — TELEPHONE (OUTPATIENT)
Dept: FAMILY MEDICINE CLINIC | Age: 86
End: 2021-03-15

## 2021-03-16 NOTE — TELEPHONE ENCOUNTER
Please have patient sign up or go to [facility]. She can be added to the list but at this time appointments are not available. Nurse called patient, patient was unable to hear nurse.

## 2021-05-17 ENCOUNTER — OFFICE VISIT (OUTPATIENT)
Dept: FAMILY MEDICINE CLINIC | Age: 86
End: 2021-05-17
Payer: MEDICARE

## 2021-05-17 VITALS
TEMPERATURE: 97.2 F | SYSTOLIC BLOOD PRESSURE: 123 MMHG | BODY MASS INDEX: 33.57 KG/M2 | HEIGHT: 64 IN | WEIGHT: 196.6 LBS | OXYGEN SATURATION: 96 % | DIASTOLIC BLOOD PRESSURE: 73 MMHG | HEART RATE: 76 BPM

## 2021-05-17 DIAGNOSIS — Z00.00 MEDICARE ANNUAL WELLNESS VISIT, SUBSEQUENT: ICD-10-CM

## 2021-05-17 DIAGNOSIS — M85.80 OSTEOPENIA, SENILE: ICD-10-CM

## 2021-05-17 DIAGNOSIS — E78.00 PURE HYPERCHOLESTEROLEMIA: Primary | ICD-10-CM

## 2021-05-17 PROCEDURE — G8427 DOCREV CUR MEDS BY ELIG CLIN: HCPCS | Performed by: INTERNAL MEDICINE

## 2021-05-17 PROCEDURE — 1101F PT FALLS ASSESS-DOCD LE1/YR: CPT | Performed by: INTERNAL MEDICINE

## 2021-05-17 PROCEDURE — G8417 CALC BMI ABV UP PARAM F/U: HCPCS | Performed by: INTERNAL MEDICINE

## 2021-05-17 PROCEDURE — G8399 PT W/DXA RESULTS DOCUMENT: HCPCS | Performed by: INTERNAL MEDICINE

## 2021-05-17 PROCEDURE — G0439 PPPS, SUBSEQ VISIT: HCPCS | Performed by: INTERNAL MEDICINE

## 2021-05-17 PROCEDURE — G8510 SCR DEP NEG, NO PLAN REQD: HCPCS | Performed by: INTERNAL MEDICINE

## 2021-05-17 PROCEDURE — G8536 NO DOC ELDER MAL SCRN: HCPCS | Performed by: INTERNAL MEDICINE

## 2021-05-17 RX ORDER — ASCORBIC ACID 500 MG
500 TABLET ORAL DAILY
COMMUNITY
End: 2022-07-19

## 2021-05-17 NOTE — PATIENT INSTRUCTIONS
Medicare Wellness Visit, Female The best way to live healthy is to have a lifestyle where you eat a well-balanced diet, exercise regularly, limit alcohol use, and quit all forms of tobacco/nicotine, if applicable. Regular preventive services are another way to keep healthy. Preventive services (vaccines, screening tests, monitoring & exams) can help personalize your care plan, which helps you manage your own care. Screening tests can find health problems at the earliest stages, when they are easiest to treat. [facility] follows the current, evidence-based guidelines published by the [facility] ([facility]) when recommending preventive services for our patients. Because we follow these guidelines, sometimes recommendations change over time as research supports it. (For example, mammograms used to be recommended annually. Even though Medicare will still pay for an annual mammogram, the newer guidelines recommend a mammogram every two years for women of average risk). Of course, you and your doctor may decide to screen more often for some diseases, based on your risk and your co-morbidities (chronic disease you are already diagnosed with). Preventive services for you include: - Medicare offers their members a free annual wellness visit, which is time for you and your primary care provider to discuss and plan for your preventive service needs. Take advantage of this benefit every year! 
-All adults over the age of 72 should receive the recommended pneumonia vaccines. Current USPSTF guidelines recommend a series of two vaccines for the best pneumonia protection.  
-All adults should have a flu vaccine yearly and a tetanus vaccine every 10 years.  
-All adults age 48 and older should receive the shingles vaccines (series of two vaccines).      
-All adults age 38-68 who are overweight should have a diabetes screening test once every three years.  
-All adults born between [birth year] and 1965 should be screened once for Hepatitis C. 
-Other screening tests and preventive services for persons with diabetes include: an eye exam to screen for diabetic retinopathy, a kidney function test, a foot exam, and stricter control over your cholesterol.  
-Cardiovascular screening for adults with routine risk involves an electrocardiogram (ECG) at intervals determined by your doctor.  
-Colorectal cancer screenings should be done for adults age 54-65 with no increased risk factors for colorectal cancer. There are a number of acceptable methods of screening for this type of cancer. Each test has its own benefits and drawbacks. Discuss with your doctor what is most appropriate for you during your annual wellness visit. The different tests include: colonoscopy (considered the best screening method), a fecal occult blood test, a fecal DNA test, and sigmoidoscopy. 
 
-A bone mass density test is recommended when a woman turns 65 to screen for osteoporosis. This test is only recommended one time, as a screening. Some providers will use this same test as a disease monitoring tool if you already have osteoporosis. -Breast cancer screenings are recommended every other year for women of normal risk, age 54-69. 
-Cervical cancer screenings for women over age 72 are only recommended with certain risk factors. Here is a list of your current Health Maintenance items (your personalized list of preventive services) with a due date: 
Health Maintenance Due Topic Date Due  
 COVID-19 Vaccine (1) Never done  DTaP/Tdap/Td  (1 - Tdap) Never done  Shingles Vaccine (1 of 2) Never done

## 2021-05-17 NOTE — PROGRESS NOTES
This is the Subsequent Medicare Annual Wellness Exam, performed 12 months or more after the Initial AWV or the last Subsequent AWV    I have reviewed the patient's medical history in detail and updated the computerized patient record. Assessment/Plan   Education and counseling provided:  Are appropriate based on today's review and evaluation    1. Pure hypercholesterolemia  2. Osteopenia, senile  3. Medicare annual wellness visit, subsequent     General: alert, oriented, not in distress  Head: scalp normal, atraumatic  Eyes: pupils are equal and reactive, full and intact EOM's  Chest/Lungs: clear breath sounds, no wheezing or crackles  Heart: normal rate, regular rhythm, no murmur  Extremities: no focal deformities, no edema  Skin: no active skin lesions    Depression Risk Factor Screening     3 most recent PHQ Screens 5/17/2021   Little interest or pleasure in doing things Not at all   Feeling down, depressed, irritable, or hopeless Not at all   Total Score PHQ 2 0       Alcohol Risk Screen    Do you average more than 1 drink per night or more than 7 drinks a week:  No    On any one occasion in the past three months have you have had more than 3 drinks containing alcohol:  No      Functional Ability and Level of Safety    Hearing: Hearing is good. Activities of Daily Living: The home contains: no safety equipment. Patient does total self care      Ambulation: with no difficulty     Fall Risk:  Fall Risk Assessment, last 12 mths 5/17/2021   Able to walk? Yes   Fall in past 12 months? 0   Do you feel unsteady?  0   Are you worried about falling 0      Abuse Screen:  Patient is not abused       Cognitive Screening    Has your family/caregiver stated any concerns about your memory: no     Cognitive Screening: Normal - MMSE (Mini Mental Status Exam)    Health Maintenance Due     Health Maintenance Due   Topic Date Due    COVID-19 Vaccine (1) Never done    DTaP/Tdap/Td series (1 - Tdap) Never done   Kayden Head Shingrix Vaccine Age 50> (1 of 2) Never done       Patient Care Team   Patient Care Team:  Edson Fong MD as PCP - General (Internal Medicine)  Edson Fong [facility] PCP [facility]  Toney Ford MD (Gastroenterology)  Gayle Capps OD (Optometry)    History     Patient Active Problem List   Diagnosis Code    Urinary, incontinence, stress female N39.3    Advance care planning Z71.89    Pure hypercholesterolemia E78.00    Osteopenia, senile M85.80     Past Medical History:   Diagnosis Date    COVID-19     Indigestion     Mixed incontinence     OAB (overactive bladder)     Osteopenia     Urinary incontinence       Past Surgical History:   Procedure Laterality Date    HX ANKLE FRACTURE TX      HX TUBAL LIGATION      25 years ago     Current Outpatient Medications   Medication Sig Dispense Refill    ascorbic acid, vitamin C, (Vitamin C) 500 mg tablet Take 500 mg by mouth daily.  albuterol (ProAir HFA) 90 mcg/actuation inhaler Take 2 Puffs by inhalation every four (4) hours as needed for Wheezing. 1 Inhaler 0    OTHER CBD caps and cream      cholecalciferol (VITAMIN D3) 1,000 unit tablet Take  by mouth daily.  calcium 500 mg Tab Take  by mouth daily.  trospium (SANCTURA XL) 60 mg capsule Take 1 Cap by mouth Daily (before breakfast). 90 Cap 3    IODINE PO Take 500 mg by mouth.  selenium 200 mcg cap Take  by mouth. Allergies   Allergen Reactions    Myrbetriq [Mirabegron] Other (comments)     Headache and dizziness        Family History   Problem Relation Age of Onset    Hypertension Father      Social History     Tobacco Use    Smoking status: Never Smoker    Smokeless tobacco: Never Used   Substance Use Topics    Alcohol use:  Yes     Alcohol/week: 1.0 standard drinks     Types: 1 Glasses of wine per week     Comment: occasional glass of  wine         Mally Can MD

## 2021-05-17 NOTE — PROGRESS NOTES
Pavithra Yan presents today for hx of covid 19   - Is someone accompanying this pt? no  - Is the patient using any durable medical equipment during office visit? no    Coordination of Care:  1. Have you been to the ER, urgent care clinic since your last visit? Hospitalized since your last visit? Yes 4/7- covid positive    2. Have you seen or consulted any other health care providers outside of the [address] [place] [address] since your last visit? Include any pap smears or colon screening.  no

## 2021-09-13 ENCOUNTER — OFFICE VISIT (OUTPATIENT)
Dept: FAMILY MEDICINE CLINIC | Age: 86
End: 2021-09-13
Payer: MEDICARE

## 2021-09-13 VITALS
RESPIRATION RATE: 16 BRPM | SYSTOLIC BLOOD PRESSURE: 108 MMHG | BODY MASS INDEX: 32.95 KG/M2 | TEMPERATURE: 98.2 F | DIASTOLIC BLOOD PRESSURE: 66 MMHG | OXYGEN SATURATION: 99 % | HEIGHT: 64 IN | HEART RATE: 70 BPM | WEIGHT: 193 LBS
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
## 2021-09-13 DIAGNOSIS — R73.9 HYPERGLYCEMIA: Primary | ICD-10-CM

## 2021-09-13 DIAGNOSIS — E78.00 PURE HYPERCHOLESTEROLEMIA: ICD-10-CM

## 2021-09-13 PROCEDURE — G8536 NO DOC ELDER MAL SCRN: HCPCS | Performed by: INTERNAL MEDICINE

## 2021-09-13 PROCEDURE — G8510 SCR DEP NEG, NO PLAN REQD: HCPCS | Performed by: INTERNAL MEDICINE

## 2021-09-13 PROCEDURE — G8417 CALC BMI ABV UP PARAM F/U: HCPCS | Performed by: INTERNAL MEDICINE

## 2021-09-13 PROCEDURE — 99214 OFFICE O/P EST MOD 30 MIN: CPT | Performed by: INTERNAL MEDICINE

## 2021-09-13 PROCEDURE — 1090F PRES/ABSN URINE INCON ASSESS: CPT | Performed by: INTERNAL MEDICINE

## 2021-09-13 PROCEDURE — 1101F PT FALLS ASSESS-DOCD LE1/YR: CPT | Performed by: INTERNAL MEDICINE

## 2021-09-13 PROCEDURE — G8427 DOCREV CUR MEDS BY ELIG CLIN: HCPCS | Performed by: INTERNAL MEDICINE

## 2021-09-13 PROCEDURE — G8399 PT W/DXA RESULTS DOCUMENT: HCPCS | Performed by: INTERNAL MEDICINE

## 2021-09-13 NOTE — PROGRESS NOTES
Emmanuel Cavazos presents today for fall follow up 8/30   - Is someone accompanying this pt? No   - Is the patient using any durable medical equipment during office visit? no    Coordination of Care:  1. Have you been to the ER, urgent care clinic since your last visit? Hospitalized since your last visit? Yes ED follow up on 8/30    2. Have you seen or consulted any other health care providers outside of the [address] [place] [address] since your last visit? Include any pap smears or colon screening.  no

## 2021-09-13 NOTE — PROGRESS NOTES
History of Present Illness  Ke Yepez is a 80 y.o. female who presents today for management of    Chief Complaint   Patient presents with    Fall       Fall  Patient is here for evaluation after a fall. Patient reportedly was in the shower when she fell and hit the side of the bath tub. This happened 2 weeks ago. At this time she complains of no injuries. Patient denies visual change, neck pain, low back pain and flank pain. She used to have right-sided pain. Patient was seen at [facility] ER 2 weeks ago. The patient has tried lidocaine 5% patch. Patient had annual lab screening at [address] in May 2021. Her hemoglobin A1c was 8.2%. Patient reports that she has been eating chocolates and pasta. Patient denies polyuria or polydipsia. She has no prior history of diabetes. Problem List  Patient Active Problem List    Diagnosis Date Noted    Pure hypercholesterolemia 01/09/2018    Osteopenia, senile 01/09/2018    Urinary, incontinence, stress female 12/12/2017    Advance care planning 12/12/2017       Current Medications  Current Outpatient Medications   Medication Sig    ascorbic acid, vitamin C, (Vitamin C) 500 mg tablet Take 500 mg by mouth daily.  albuterol (ProAir HFA) 90 mcg/actuation inhaler Take 2 Puffs by inhalation every four (4) hours as needed for Wheezing.  trospium (SANCTURA XL) 60 mg capsule Take 1 Cap by mouth Daily (before breakfast).  IODINE PO Take 500 mg by mouth.  selenium 200 mcg cap Take  by mouth.  OTHER CBD caps and cream    cholecalciferol (VITAMIN D3) 1,000 unit tablet Take  by mouth daily.  calcium 500 mg Tab Take  by mouth daily. No current facility-administered medications for this visit. Allergies/Drug Reactions  Allergies   Allergen Reactions    Myrbetriq [Mirabegron] Other (comments)     Headache and dizziness         Review of Systems  Review of Systems   Constitutional: Negative. Respiratory: Negative. Cardiovascular: Negative. Gastrointestinal: Negative. Genitourinary: Negative for frequency. Neurological: Negative. Psychiatric/Behavioral: Negative.          Physical Exam  Vital signs:   Vitals:    09/13/21 1042   BP: 108/66   Pulse: 70   Resp: 16   Temp: 98.2 °F (36.8 °C)   TempSrc: Temporal   SpO2: 99%   Weight: 193 lb (87.5 kg)   Height: 5' 4\" (1.626 m)       General: alert, oriented, not in distress  Head: scalp normal, atraumatic  Eyes: pupils are equal and reactive, full and intact EOM's  Neck: supple, no JVD, no lymphadenopathy, non-palpable thyroid  Chest/Lungs: clear breath sounds, no wheezing or crackles  Heart: normal rate, regular rhythm, no murmur  Extremities: no focal deformities, no edema  Skin: no active skin lesions    Laboratory/Tests:  Lab Results   Component Value Date/Time    Cholesterol, total 210 (H) 12/20/2019 09:56 AM    HDL Cholesterol 80 (H) 12/20/2019 09:56 AM    LDL, calculated 114.4 (H) 12/20/2019 09:56 AM    Triglyceride 78 12/20/2019 09:56 AM    CHOL/HDL Ratio 2.6 12/20/2019 09:56 AM     Lab Results   Component Value Date/Time    TSH 1.23 06/02/2020 09:37 AM    T4, Free 1.0 06/02/2020 09:37 AM      Lab Results   Component Value Date/Time    Sodium 139 04/07/2021 01:42 PM    Potassium 3.6 04/07/2021 01:42 PM    Chloride 106 04/07/2021 01:42 PM    CO2 29 04/07/2021 01:42 PM    Anion gap 4 (L) 04/07/2021 01:42 PM    Glucose 133 (H) 04/07/2021 01:42 PM    BUN 13 04/07/2021 01:42 PM    Creatinine 0.6 04/07/2021 01:42 PM    BUN/Creatinine ratio 14 12/20/2019 09:56 AM    GFR est AA >60.0 04/07/2021 01:42 PM    GFR est non-AA >60 04/07/2021 01:42 PM    Calcium 8.9 04/07/2021 01:42 PM       Contains abnormal data BASIC METABOLIC PANEL  Specimen:  Blood - Blood (substance)   Ref Range & Units 13 d ago Comments   Potassium 3.5 - 5.5 mmol/L 3.8          Sodium 133 - 145 mmol/L 139          Chloride 98 - 110 mmol/L 99          Glucose 70 - 99 mg/dL 190High           Calcium 8.4 - 10.5 mg/dL 10.2          BUN 6 - 22 mg/dL 13          Creatinine 0.8 - 1.4 mg/dL 0.6Low           CO2 20 - 32 mmol/L 30          eGFR  >60.0  >60.0          eGFR Non African American >60.0  >60.0          Anion Gap 3.0 - 15.0 mmol/L 10.0       CBC WITH DIFFERENTIAL AUTO  Specimen:  Blood - Blood (substance)   Ref Range & Units 13 d ago   WBC x 10*3 4.0 - 11.0 K/uL 8.6        RBC x 10^6 3. 80 - 5.20 M/uL 5. 22High         HGB 11.7 - 16.1 g/dL 14.2        HCT 35.1 - 48.3 % 46.0        MCV 81 - 99 fL 88        MCH 26 - 34 pg 27        MCHC 31 - 36 g/dL 31        RDW 10.0 - 15.5 % 13.0        Platelet 803 - 678 K/uL 250        MPV 9.0 - 13.0 fL 9.8        Segmented Neutrophils (Auto) 40 - 75 % 52        Lymphocytes (Auto) 20 - 45 % 38        Monocytes (Auto) 3 - 12 % 8        Eosinophils (Auto) 0 - 6 % 1        Basophils (Auto) 0 - 2 % 0        Absolute Neutrophils (Auto) 1.8 - 7.7 K/uL 4.5        Absolute Lymphocytes (Auto) 1.0 - 4.8 K/uL 3.3        Absolute Monocytes (Auto) 0.1 - 1.0 K/uL 0.7        Absolute Eosinophils (Auto) 0.0 - 0.5 K/uL 0.1        Absolute Basophils (Auto) 0.0 - 0.2 K/uL 0.0        CT ABDOMEN AND PELVIS WITHOUT CONTRAST 8/30/21    FINDINGS:   Lower chest: There is bibasilar atelectasis. There are small granulomas in the lung bases. Liver: Negative. Biliary: Negative. Pancreas: Negative. Spleen: Negative. Adrenal glands: Negative. Kidneys: No definite urinary tract calcification is identified. Multiple calcifications near the distal ureters are most likely vascular phleboliths. Stomach, Small Bowel and Colon: Small hiatal hernia. No evidence of bowel obstruction. There is no definite focal bowel wall thickening. Moderate stool. Pelvic Organs: Negative. Bladder: Negative. Lymph nodes: No lymphadenopathy. Vessels: Atherosclerosis. Peritoneal Spaces: No free fluid or free air. Body wall: Small fat-containing umbilical hernia.     Bones: Degenerative changes of the spine      IMPRESSION  No acute inflammation in abdomen or pelvis. No definite urinary tract calcification. Numerous calcifications in the pelvis near the distal ureters are favored to be phleboliths. Moderate stool burden. Small hiatal hernia. Assessment/Plan:    1. Hyperglycemia  - lifestyle modification  - HEMOGLOBIN A1C WITH EAG; Future in 2 months  - METABOLIC PANEL, COMPREHENSIVE; Future    2. Pure hypercholesterolemia  - diet-controlled  - LIPID PANEL; Future  - METABOLIC PANEL, COMPREHENSIVE; Future    3. Fall, sequela  - no current injuries      Follow-up and Dispositions    · Return in about 2 months (around 11/13/2021) for ROV, results, in-person. I have discussed the diagnosis with the patient and the intended plan as seen in the above orders. The patient has received an after-visit summary and questions were answered concerning future plans. I have discussed medication side effects and warnings with the patient as well. I have reviewed the plan of care with the patient, accepted their input and they are in agreement with the treatment goals.        Gayatri Broussard MD  September 13, 2021

## 2021-11-09 ENCOUNTER — HOSPITAL ENCOUNTER (OUTPATIENT)
Dept: LAB | Age: 86
Discharge: HOME | End: 2021-11-09
Payer: MEDICARE

## 2021-11-09 DIAGNOSIS — R73.9 HYPERGLYCEMIA: ICD-10-CM

## 2021-11-09 DIAGNOSIS — E78.00 PURE HYPERCHOLESTEROLEMIA: ICD-10-CM

## 2021-11-09 LAB
ALBUMIN SERPL-MCNC: 3.8 G/DL (ref 3.4–5)
ALBUMIN/GLOB SERPL: 1 {RATIO} (ref 0.8–1.7)
ALP SERPL-CCNC: 96 U/L (ref 45–117)
ALT SERPL-CCNC: 16 U/L (ref 13–56)
ANION GAP SERPL CALC-SCNC: 4 MMOL/L (ref 3–18)
AST SERPL-CCNC: 18 U/L (ref 10–38)
BILIRUB SERPL-MCNC: 0.5 MG/DL (ref 0.2–1)
BUN SERPL-MCNC: 14 MG/DL (ref 7–18)
BUN/CREAT SERPL: 22 (ref 12–20)
CALCIUM SERPL-MCNC: 9.5 MG/DL (ref 8.5–10.1)
CHLORIDE SERPL-SCNC: 105 MMOL/L (ref 100–111)
CHOLEST SERPL-MCNC: 209 MG/DL
CO2 SERPL-SCNC: 29 MMOL/L (ref 21–32)
CREAT SERPL-MCNC: 0.65 MG/DL (ref 0.6–1.3)
EST. AVERAGE GLUCOSE BLD GHB EST-MCNC: 169 MG/DL
GLOBULIN SER CALC-MCNC: 3.7 G/DL (ref 2–4)
GLUCOSE SERPL-MCNC: 121 MG/DL (ref 74–99)
HBA1C MFR BLD: 7.5 % (ref 4.2–5.6)
HDLC SERPL-MCNC: 90 MG/DL (ref 40–60)
HDLC SERPL: 2.3 {RATIO} (ref 0–5)
LDLC SERPL CALC-MCNC: 107.2 MG/DL (ref 0–100)
LIPID PROFILE,FLP: ABNORMAL
POTASSIUM SERPL-SCNC: 4.4 MMOL/L (ref 3.5–5.5)
PROT SERPL-MCNC: 7.5 G/DL (ref 6.4–8.2)
SODIUM SERPL-SCNC: 138 MMOL/L (ref 136–145)
TRIGL SERPL-MCNC: 59 MG/DL (ref ?–150)
VLDLC SERPL CALC-MCNC: 11.8 MG/DL

## 2021-11-09 PROCEDURE — 83036 HEMOGLOBIN GLYCOSYLATED A1C: CPT

## 2021-11-09 PROCEDURE — 80061 LIPID PANEL: CPT

## 2021-11-09 PROCEDURE — 80053 COMPREHEN METABOLIC PANEL: CPT

## 2021-11-09 PROCEDURE — 36415 COLL VENOUS BLD VENIPUNCTURE: CPT

## 2021-11-16 ENCOUNTER — OFFICE VISIT (OUTPATIENT)
Dept: FAMILY MEDICINE CLINIC | Age: 86
End: 2021-11-16
Payer: MEDICARE

## 2021-11-16 ENCOUNTER — TELEPHONE (OUTPATIENT)
Dept: FAMILY MEDICINE CLINIC | Age: 86
End: 2021-11-16

## 2021-11-16 VITALS
HEIGHT: 64 IN | DIASTOLIC BLOOD PRESSURE: 60 MMHG | TEMPERATURE: 97.2 F | OXYGEN SATURATION: 98 % | HEART RATE: 73 BPM | WEIGHT: 196.6 LBS | BODY MASS INDEX: 33.57 KG/M2 | SYSTOLIC BLOOD PRESSURE: 108 MMHG

## 2021-11-16 DIAGNOSIS — R10.13 EPIGASTRIC PAIN: ICD-10-CM

## 2021-11-16 DIAGNOSIS — Z23 NEEDS FLU SHOT: ICD-10-CM

## 2021-11-16 DIAGNOSIS — E11.9 NEWLY DIAGNOSED DIABETES (HCC): Primary | ICD-10-CM

## 2021-11-16 DIAGNOSIS — R06.09 DYSPNEA ON EXERTION: ICD-10-CM

## 2021-11-16 DIAGNOSIS — E78.00 PURE HYPERCHOLESTEROLEMIA: ICD-10-CM

## 2021-11-16 PROCEDURE — 99214 OFFICE O/P EST MOD 30 MIN: CPT | Performed by: INTERNAL MEDICINE

## 2021-11-16 PROCEDURE — G0008 ADMIN INFLUENZA VIRUS VAC: HCPCS | Performed by: INTERNAL MEDICINE

## 2021-11-16 PROCEDURE — G8536 NO DOC ELDER MAL SCRN: HCPCS | Performed by: INTERNAL MEDICINE

## 2021-11-16 PROCEDURE — G8417 CALC BMI ABV UP PARAM F/U: HCPCS | Performed by: INTERNAL MEDICINE

## 2021-11-16 PROCEDURE — 90694 VACC AIIV4 NO PRSRV 0.5ML IM: CPT | Performed by: INTERNAL MEDICINE

## 2021-11-16 PROCEDURE — G8427 DOCREV CUR MEDS BY ELIG CLIN: HCPCS | Performed by: INTERNAL MEDICINE

## 2021-11-16 PROCEDURE — 1090F PRES/ABSN URINE INCON ASSESS: CPT | Performed by: INTERNAL MEDICINE

## 2021-11-16 PROCEDURE — 1101F PT FALLS ASSESS-DOCD LE1/YR: CPT | Performed by: INTERNAL MEDICINE

## 2021-11-16 PROCEDURE — 3051F HG A1C>EQUAL 7.0%<8.0%: CPT | Performed by: INTERNAL MEDICINE

## 2021-11-16 PROCEDURE — G8510 SCR DEP NEG, NO PLAN REQD: HCPCS | Performed by: INTERNAL MEDICINE

## 2021-11-16 RX ORDER — ALBUTEROL SULFATE 90 UG/1
2 AEROSOL, METERED RESPIRATORY (INHALATION)
Status: CANCELLED | OUTPATIENT
Start: 2021-11-16

## 2021-11-16 RX ORDER — ATORVASTATIN CALCIUM 10 MG/1
10 TABLET, FILM COATED ORAL DAILY
Qty: 90 TABLET | Refills: 0 | Status: SHIPPED | OUTPATIENT
Start: 2021-11-16 | End: 2022-07-19

## 2021-11-16 RX ORDER — METFORMIN HYDROCHLORIDE 500 MG/1
500 TABLET, EXTENDED RELEASE ORAL
Qty: 90 TABLET | Refills: 0 | Status: SHIPPED | OUTPATIENT
Start: 2021-11-16 | End: 2022-02-14 | Stop reason: SDDI

## 2021-11-16 RX ORDER — FAMOTIDINE 40 MG/1
40 TABLET, FILM COATED ORAL DAILY
Qty: 14 TABLET | Refills: 0 | Status: SHIPPED | OUTPATIENT
Start: 2021-11-16 | End: 2021-11-30

## 2021-11-16 RX ORDER — ALBUTEROL SULFATE 90 UG/1
2 AEROSOL, METERED RESPIRATORY (INHALATION)
Qty: 1 EACH | Refills: 1 | Status: SHIPPED | OUTPATIENT
Start: 2021-11-16

## 2021-11-16 NOTE — PROGRESS NOTES
Chief Complaint   Patient presents with    Shortness of Breath     w/activities    Abdominal Pain     cramping w/nausea and heartburn      1. Have you been to the ER, urgent care clinic since your last visit? Hospitalized since your last visit? No    2. Have you seen or consulted any other health care providers outside of the [address] [place] [address] since your last visit? Include any pap smears or colon screening. No    Health Maintenance Due   Topic Date Due    DTaP/Tdap/Td series (1 - Tdap) Never done    Shingrix Vaccine Age 50> (1 of 2) Never done    Flu Vaccine (1) 09/01/2021     Patient was given VIS for review, consent was obtained and per orders of Dr. Marcie Cai, injection of Flu vaccine given by Ilana Patch. Patient observed. No signs nor symptoms of any adverse reactions. Patient tolerated injection well.

## 2021-11-16 NOTE — PROGRESS NOTES
History of Present Illness  Kirstie Kern is a 80 y.o. female who presents today for management of    Chief Complaint   Patient presents with    Abdominal Pain     cramping w/nausea and heartburn        Abdominal Pain  Patient complains of abdominal pain. The pain is described as burning, and is 6/10 in intensity. Pain is located in the epigastric without radiation. Onset was a few months ago. Symptoms have been unchanged since. Aggravating factors: mornings. Alleviating factors: milk. Associated symptoms: nausea. The patient denies constipation, diarrhea, melena and vomiting. Diabetes Mellitus:  She has newly diagnosed diabetes mellitus, and  hyperlipidemia. Diabetic ROS - diabetic diet compliance: compliant most of the time, further diabetic ROS: no polyuria or polydipsia, no chest pain, dyspnea or TIA's, no numbness, tingling or pain in extremities. Lab review: labs reviewed, I note that lipids LDL result does not yet meet goal, renal functions normal.     Patient reports of chronic dyspnea on exertion which has been stable. She uses an albuterol inhaler which helps. Problem List  Patient Active Problem List    Diagnosis Date Noted    [facility] 11/16/2021    Pure hypercholesterolemia 01/09/2018    Osteopenia, senile 01/09/2018    Urinary, incontinence, stress female 12/12/2017    Advance care planning 12/12/2017       Current Medications  Current Outpatient Medications   Medication Sig    metFORMIN ER (GLUCOPHAGE XR) 500 mg tablet Take 1 Tablet by mouth daily (with dinner).  atorvastatin (LIPITOR) 10 mg tablet Take 1 Tablet by mouth daily.  famotidine (PEPCID) 40 mg tablet Take 1 Tablet by mouth daily for 14 days.  ascorbic acid, vitamin C, (Vitamin C) 500 mg tablet Take 500 mg by mouth daily.  albuterol (ProAir HFA) 90 mcg/actuation inhaler Take 2 Puffs by inhalation every four (4) hours as needed for Wheezing.  selenium 200 mcg cap Take  by mouth.     OTHER CBD caps and cream    cholecalciferol (VITAMIN D3) 1,000 unit tablet Take  by mouth daily.  calcium 500 mg Tab Take  by mouth daily.  trospium (SANCTURA XL) 60 mg capsule Take 1 Cap by mouth Daily (before breakfast). (Patient not taking: Reported on 11/16/2021)    IODINE PO Take 500 mg by mouth. (Patient not taking: Reported on 11/16/2021)     No current facility-administered medications for this visit. Allergies/Drug Reactions  Allergies   Allergen Reactions    Myrbetriq [Mirabegron] Other (comments)     Headache and dizziness         Review of Systems  Review of Systems   Constitutional: Negative. Respiratory: Positive for shortness of breath (on exertion). Cardiovascular: Negative. Gastrointestinal: Positive for abdominal pain and nausea. Negative for heartburn. Musculoskeletal: Negative. Neurological: Negative. Psychiatric/Behavioral: Negative.          Physical Exam  Vital signs:   Vitals:    11/16/21 0835   BP: 108/60   Pulse: 73   Temp: 97.2 °F (36.2 °C)   TempSrc: Temporal   SpO2: 98%   Weight: 196 lb 9.6 oz (89.2 kg)   Height: 5' 4\" (1.626 m)       General: alert, oriented, not in distress  Head: scalp normal, atraumatic  Eyes: pupils are equal and reactive, full and intact EOM's  Neck: supple, no JVD, no lymphadenopathy, non-palpable thyroid  Chest/Lungs: clear breath sounds, no wheezing or crackles  Heart: normal rate, regular rhythm, no murmur  Abdomen: soft, non-distended, non-tender, tympanitic, normal bowel sounds, no organomegaly, no masses  Extremities: no focal deformities, no edema  Skin: no active skin lesions    Laboratory/Tests:  Lab Results   Component Value Date/Time    Hemoglobin A1c 7.5 (H) 11/09/2021 07:14 AM    Glucose 121 (H) 11/09/2021 07:14 AM    LDL, calculated 107.2 (H) 11/09/2021 07:14 AM    Creatinine 0.65 11/09/2021 07:14 AM      Lab Results   Component Value Date/Time    TSH 1.23 06/02/2020 09:37 AM    T4, Free 1.0 06/02/2020 09:37 AM ECHOCARDIOGRAM 10/2018  · Estimated left ventricular ejection fraction is 56 - 60%. Visually measured ejection fraction. Left ventricular mild concentric hypertrophy. Age-appropriate left ventricular diastolic function. · Right ventricular cavity size is mildly dilated. · Right atrial cavity size is mildly dilated. · Left atrial cavity size is mildly dilated. · Mitral valve non-specific thickening. Mild mitral valve regurgitation. · Non-specific thickening of the tricuspid valve. Mild tricuspid valve regurgitation is present. · Mild pulmonic valve regurgitation is present. Assessment/Plan:    1. Epigastric pain  - trial of famotidine (PEPCID) 40 mg tablet; Take 1 Tablet by mouth daily for 14 days. Dispense: 14 Tablet; Refill: 0    2. Newly diagnosed diabetes ([facility]  - start metFORMIN ER (GLUCOPHAGE XR) 500 mg tablet; Take 1 Tablet by mouth daily (with dinner). Dispense: 90 Tablet; Refill: 0    3. Pure hypercholesterolemia  - low fat diet  - start atorvastatin (LIPITOR) 10 mg tablet; Take 1 Tablet by mouth daily. Dispense: 90 Tablet; Refill: 0    4. Needs flu shot  - FLU (FLUAD QUAD INFLUENZA VACCINE,QUAD,ADJUVANTED)    5. Dyspnea on exertion  - DDx: deconditioning, reactive airway disease  - echocardiogram unremarkable  - Albuterol has been helpful. Follow-up and Dispositions    · Return in about 3 months (around 2/16/2022) for ROV, establish care with a new PCP. I have discussed the diagnosis with the patient and the intended plan as seen in the above orders. The patient has received an after-visit summary and questions were answered concerning future plans. I have discussed medication side effects and warnings with the patient as well. I have reviewed the plan of care with the patient, accepted their input and they are in agreement with the treatment goals.        Lorenzo Wan MD  November 16, 2021

## 2022-02-14 ENCOUNTER — OFFICE VISIT (OUTPATIENT)
Dept: FAMILY MEDICINE CLINIC | Age: 87
End: 2022-02-14
Payer: MEDICARE

## 2022-02-14 VITALS
OXYGEN SATURATION: 100 % | HEART RATE: 75 BPM | RESPIRATION RATE: 16 BRPM | SYSTOLIC BLOOD PRESSURE: 137 MMHG | WEIGHT: 185.8 LBS | DIASTOLIC BLOOD PRESSURE: 82 MMHG | BODY MASS INDEX: 31.72 KG/M2 | TEMPERATURE: 97.6 F | HEIGHT: 64 IN

## 2022-02-14 DIAGNOSIS — E11.9 CONTROLLED TYPE 2 DIABETES MELLITUS WITHOUT COMPLICATION, WITHOUT LONG-TERM CURRENT USE OF INSULIN (HCC): Primary | ICD-10-CM

## 2022-02-14 DIAGNOSIS — Z76.89 ENCOUNTER TO ESTABLISH CARE: ICD-10-CM

## 2022-02-14 LAB — HBA1C MFR BLD HPLC: 7 %

## 2022-02-14 PROCEDURE — 3051F HG A1C>EQUAL 7.0%<8.0%: CPT | Performed by: STUDENT IN AN ORGANIZED HEALTH CARE EDUCATION/TRAINING PROGRAM

## 2022-02-14 PROCEDURE — 83036 HEMOGLOBIN GLYCOSYLATED A1C: CPT | Performed by: STUDENT IN AN ORGANIZED HEALTH CARE EDUCATION/TRAINING PROGRAM

## 2022-02-14 PROCEDURE — 99213 OFFICE O/P EST LOW 20 MIN: CPT | Performed by: STUDENT IN AN ORGANIZED HEALTH CARE EDUCATION/TRAINING PROGRAM

## 2022-02-14 PROCEDURE — 1090F PRES/ABSN URINE INCON ASSESS: CPT | Performed by: STUDENT IN AN ORGANIZED HEALTH CARE EDUCATION/TRAINING PROGRAM

## 2022-02-14 PROCEDURE — G8417 CALC BMI ABV UP PARAM F/U: HCPCS | Performed by: STUDENT IN AN ORGANIZED HEALTH CARE EDUCATION/TRAINING PROGRAM

## 2022-02-14 PROCEDURE — G8536 NO DOC ELDER MAL SCRN: HCPCS | Performed by: STUDENT IN AN ORGANIZED HEALTH CARE EDUCATION/TRAINING PROGRAM

## 2022-02-14 PROCEDURE — 1101F PT FALLS ASSESS-DOCD LE1/YR: CPT | Performed by: STUDENT IN AN ORGANIZED HEALTH CARE EDUCATION/TRAINING PROGRAM

## 2022-02-14 PROCEDURE — G8427 DOCREV CUR MEDS BY ELIG CLIN: HCPCS | Performed by: STUDENT IN AN ORGANIZED HEALTH CARE EDUCATION/TRAINING PROGRAM

## 2022-02-14 PROCEDURE — G8510 SCR DEP NEG, NO PLAN REQD: HCPCS | Performed by: STUDENT IN AN ORGANIZED HEALTH CARE EDUCATION/TRAINING PROGRAM

## 2022-02-14 NOTE — PROGRESS NOTES
Froy Andrews is a 80 y.o.  female and presents with    Chief Complaint   Patient presents with    Establish Care           Subjective:    Pt is here to establish care. Denies any issues at this time. Diabetes  Taking medications as prescribed: NO  Currently on:  Was supposed to be taking Metformin, states she is only taking at this time Syntra 5 - The [address] blood sugars at home at home: YES  Symptoms: none  Diabetic diet: YES  Exercise: YES    Pt states she is also taking [employer] Momord[employer] drink - (Anna Marie thakkar), vitamin D3, C, and E. She is complaint w/ her Atorvastatin. Patient Active Problem List   Diagnosis Code    Urinary, incontinence, stress female N39.3    Advance care planning Z71.89    Pure hypercholesterolemia E78.00    Osteopenia, senile M85.80      Past Medical History:   Diagnosis Date    COVID-19     Indigestion     Mixed incontinence     OAB (overactive bladder)     Osteopenia     Urinary incontinence       Past Surgical History:   Procedure Laterality Date    HX ANKLE FRACTURE TX      HX TUBAL LIGATION      25 years ago      Family History   Problem Relation Age of Onset    Hypertension Father      Social History     Socioeconomic History    Marital status: SINGLE     Spouse name: Not on file    Number of children: Not on file    Years of education: Not on file    Highest education level: Not on file   Occupational History    Not on file   Tobacco Use    Smoking status: Never Smoker    Smokeless tobacco: Never Used   Vaping Use    Vaping Use: Never used   Substance and Sexual Activity    Alcohol use:  Yes     Alcohol/week: 1.0 standard drink     Types: 1 Glasses of wine per week     Comment: occasional glass of  wine    Drug use: No    Sexual activity: Never   Other Topics Concern    Not on file   Social History Narrative    Not on file     Social Determinants of Health     Financial Resource Strain:     Difficulty of Paying Living Expenses: Not on file   Food Insecurity:     Worried About Running Out of Food in the Last Year: Not on file    Giana of Food in the Last Year: Not on file   Transportation Needs:     Lack of Transportation (Medical): Not on file    Lack of Transportation (Non-Medical): Not on file   Physical Activity:     Days of Exercise per Week: Not on file    Minutes of Exercise per Session: Not on file   Stress:     Feeling of Stress : Not on file   Social Connections:     Frequency of Communication with Friends and Family: Not on file    Frequency of Social Gatherings with Friends and Family: Not on file    Attends Sikhism Services: Not on file    Active Member of [address] [place] [address] [employer] or Organizations: Not on file    Attends Club or Organization Meetings: Not on file    Marital Status: Not on file   Intimate Partner Violence:     Fear of Current or Ex-Partner: Not on file    Emotionally Abused: Not on file    Physically Abused: Not on file    Sexually Abused: Not on file   Housing Stability:     Unable to Pay for Housing in the Last Year: Not on file    Number of [place] in the Last Year: Not on file    Unstable Housing in the Last Year: Not on file        Current Outpatient Medications   Medication Sig Dispense Refill    lancets misc Use once daily before breakfast 100 Each 2    Blood-Glucose Meter monitoring kit Use once daily before breakfast 1 Kit 0    glucose blood VI test strips (blood glucose test) strip Use once daily before breakfast 100 Strip 2    albuterol (ProAir HFA) 90 mcg/actuation inhaler Take 2 Puffs by inhalation every four (4) hours as needed for Wheezing. 1 Each 1    ascorbic acid, vitamin C, (Vitamin C) 500 mg tablet Take 500 mg by mouth daily.  OTHER CBD caps and cream      cholecalciferol (VITAMIN D3) 1,000 unit tablet Take  by mouth daily.  calcium 500 mg Tab Take  by mouth daily.  metFORMIN ER (GLUCOPHAGE XR) 500 mg tablet Take 1 Tablet by mouth daily (with dinner). (Patient not taking: Reported on 2/14/2022) 90 Tablet 0    atorvastatin (LIPITOR) 10 mg tablet Take 1 Tablet by mouth daily. (Patient not taking: Reported on 2/14/2022) 90 Tablet 0    trospium (SANCTURA XL) 60 mg capsule Take 1 Cap by mouth Daily (before breakfast). (Patient not taking: Reported on 11/16/2021) 90 Cap 3    IODINE PO Take 500 mg by mouth. (Patient not taking: Reported on 11/16/2021)      selenium 200 mcg cap Take  by mouth. (Patient not taking: Reported on 2/14/2022)          ROS   Review of Systems   Constitutional: Negative for chills, fever and malaise/fatigue. HENT: Negative for congestion, ear discharge and ear pain. Eyes: Negative for blurred vision, pain and discharge. Respiratory: Negative for cough and shortness of breath. Cardiovascular: Negative for chest pain and palpitations. Gastrointestinal: Negative for abdominal pain, nausea and vomiting. Genitourinary: Negative for dysuria, frequency and urgency. Skin: Negative for itching and rash. Neurological: Negative for dizziness, seizures, loss of consciousness and headaches. Psychiatric/Behavioral: Negative for substance abuse. Objective:  Vitals:    02/14/22 0821   BP: 137/82   Pulse: 75   Resp: 16   Temp: 97.6 °F (36.4 °C)   TempSrc: Temporal   SpO2: 100%   Weight: 185 lb 12.8 oz (84.3 kg)   Height: 5' 4\" (1.626 m)   PainSc:   0 - No pain       Physical Exam  Constitutional:       Appearance: Normal appearance. Eyes:      General: No scleral icterus. Right eye: No discharge. Left eye: No discharge. Pulmonary:      Effort: Pulmonary effort is normal. No respiratory distress. Musculoskeletal:      Cervical back: Normal range of motion and neck supple. Neurological:      Mental Status: She is alert and oriented to person, place, and time. Cranial Nerves: No cranial nerve deficit.    Psychiatric:         Mood and Affect: Mood normal.         Behavior: Behavior normal.         Thought Content: Thought content normal.         Judgment: Judgment normal.           LABS     TESTS      Assessment/Plan:    1. Controlled type 2 diabetes mellitus without complication, without long-term current use of insulin (HCC)  Improved A1C. Went from 7.5% to 7%. Will d/c Metformin since pt seems to be controlling this with diet  Labs to be done prior to next appt. - AMB POC HEMOGLOBIN A1C  - MICROALBUMIN, UR, RAND W/ MICROALB/CREAT RATIO; Future  - HEMOGLOBIN A1C WITH EAG; Future  - METABOLIC PANEL, COMPREHENSIVE; Future    2. Encounter to establish care  Will be pt PCP        Lab review: orders written for new lab studies as appropriate; see orders      I have discussed the diagnosis with the patient and the intended plan as seen in the above orders. The patient has received an after-visit summary and questions were answered concerning future plans. I have discussed medication side effects and warnings with the patient as well. I have reviewed the plan of care with the patient, accepted their input and they are in agreement with the treatment goals.          Nikki Martinez MD

## 2022-02-14 NOTE — PROGRESS NOTES
Chief Complaint   Patient presents with   Karen Dineroig Establish Care     1. Have you been to the ER, urgent care clinic since your last visit? Hospitalized since your last visit? No    2. Have you seen or consulted any other health care providers outside of the [address] [place] [address] since your last visit? Include any pap smears or colon screening.  No    Health Maintenance Due   Topic Date Due    Foot Exam Q1  Never done    MICROALBUMIN Q1  Never done    Eye Exam Retinal or Dilated  Never done    DTaP/Tdap/Td series (1 - Tdap) Never done    Shingrix Vaccine Age 50> (1 of 2) Never done    COVID-19 Vaccine (3 - Booster for Moderna series) 10/26/2021

## 2022-03-19 PROBLEM — N39.3 URINARY, INCONTINENCE, STRESS FEMALE: Status: ACTIVE | Noted: 2017-12-12

## 2022-03-19 PROBLEM — E78.00 PURE HYPERCHOLESTEROLEMIA: Status: ACTIVE | Noted: 2018-01-09

## 2022-03-19 PROBLEM — Z71.89 ADVANCE CARE PLANNING: Status: ACTIVE | Noted: 2017-12-12

## 2022-03-20 PROBLEM — M85.80 OSTEOPENIA, SENILE: Status: ACTIVE | Noted: 2018-01-09

## 2022-05-31 DIAGNOSIS — R42 DIZZINESS: ICD-10-CM

## 2022-05-31 RX ORDER — MECLIZINE HCL 12.5 MG 12.5 MG/1
12.5 TABLET ORAL
Qty: 30 TABLET | Refills: 0 | Status: SHIPPED | OUTPATIENT
Start: 2022-05-31 | End: 2022-06-10

## 2022-05-31 NOTE — TELEPHONE ENCOUNTER
Patient called to request medication refill for Meclizine. Patient states she was experiencing dizziness and her sugar was 269. She went to the ER and they gave her Ondansetron, but she does not like taking it. Also, patient would like to discuss this with Dr. Natividad Gonzales. She is requesting a call back today. Please assist.    Requested Prescriptions     Pending Prescriptions Disp Refills    meclizine (ANTIVERT) 12.5 mg tablet 30 Tablet 0     Sig: Take 1 Tablet by mouth three (3) times daily as needed for Dizziness for up to 10 days.

## 2022-05-31 NOTE — TELEPHONE ENCOUNTER
Requested Prescriptions     Pending Prescriptions Disp Refills    meclizine (ANTIVERT) 12.5 mg tablet 30 Tablet 0     Sig: Take 1 Tablet by mouth three (3) times daily as needed for Dizziness for up to 10 days.

## 2022-06-01 NOTE — TELEPHONE ENCOUNTER
Called pt. She seemed to have been dehydrated after a GI issue. Has been feeling better since drinking water. Refill meclizine d/t hx of vertigo. Pt concern regarding her insurance. Relay message to Mr. Galicia practice manager who will be looking into this.

## 2022-07-19 ENCOUNTER — OFFICE VISIT (OUTPATIENT)
Dept: FAMILY MEDICINE CLINIC | Age: 87
End: 2022-07-19
Payer: MEDICARE

## 2022-07-19 ENCOUNTER — HOSPITAL ENCOUNTER (OUTPATIENT)
Dept: LAB | Age: 87
Discharge: HOME | End: 2022-07-19
Payer: MEDICARE

## 2022-07-19 VITALS
BODY MASS INDEX: 29.3 KG/M2 | OXYGEN SATURATION: 98 % | SYSTOLIC BLOOD PRESSURE: 112 MMHG | HEART RATE: 67 BPM | HEIGHT: 64 IN | RESPIRATION RATE: 16 BRPM | TEMPERATURE: 97.5 F | WEIGHT: 171.6 LBS | DIASTOLIC BLOOD PRESSURE: 73 MMHG

## 2022-07-19 DIAGNOSIS — K04.7 TOOTH ABSCESS: ICD-10-CM

## 2022-07-19 DIAGNOSIS — E11.9 CONTROLLED TYPE 2 DIABETES MELLITUS WITHOUT COMPLICATION, WITHOUT LONG-TERM CURRENT USE OF INSULIN (HCC): ICD-10-CM

## 2022-07-19 DIAGNOSIS — E11.9 TYPE 2 DIABETES MELLITUS WITHOUT COMPLICATION, WITHOUT LONG-TERM CURRENT USE OF INSULIN (HCC): ICD-10-CM

## 2022-07-19 DIAGNOSIS — Z00.00 MEDICARE ANNUAL WELLNESS VISIT, SUBSEQUENT: Primary | ICD-10-CM

## 2022-07-19 LAB
ALBUMIN SERPL-MCNC: 3.6 G/DL (ref 3.4–5)
ALBUMIN/GLOB SERPL: 1 {RATIO} (ref 0.8–1.7)
ALP SERPL-CCNC: 78 U/L (ref 45–117)
ALT SERPL-CCNC: 14 U/L (ref 13–56)
ANION GAP SERPL CALC-SCNC: 5 MMOL/L (ref 3–18)
AST SERPL-CCNC: 14 U/L (ref 10–38)
BILIRUB SERPL-MCNC: 1.1 MG/DL (ref 0.2–1)
BUN SERPL-MCNC: 11 MG/DL (ref 7–18)
BUN/CREAT SERPL: 12 (ref 12–20)
CALCIUM SERPL-MCNC: 9.4 MG/DL (ref 8.5–10.1)
CHLORIDE SERPL-SCNC: 103 MMOL/L (ref 100–111)
CO2 SERPL-SCNC: 31 MMOL/L (ref 21–32)
CREAT SERPL-MCNC: 0.9 MG/DL (ref 0.6–1.3)
CREAT UR-MCNC: 109 MG/DL (ref 30–125)
EST. AVERAGE GLUCOSE BLD GHB EST-MCNC: 157 MG/DL
GLOBULIN SER CALC-MCNC: 3.6 G/DL (ref 2–4)
GLUCOSE SERPL-MCNC: 143 MG/DL (ref 74–99)
HBA1C MFR BLD: 7.1 % (ref 4.2–5.6)
MICROALBUMIN UR-MCNC: 2.38 MG/DL (ref 0–3)
MICROALBUMIN/CREAT UR-RTO: 22 MG/G (ref 0–30)
POTASSIUM SERPL-SCNC: 4 MMOL/L (ref 3.5–5.5)
PROT SERPL-MCNC: 7.2 G/DL (ref 6.4–8.2)
SODIUM SERPL-SCNC: 139 MMOL/L (ref 136–145)

## 2022-07-19 PROCEDURE — 3051F HG A1C>EQUAL 7.0%<8.0%: CPT | Performed by: STUDENT IN AN ORGANIZED HEALTH CARE EDUCATION/TRAINING PROGRAM

## 2022-07-19 PROCEDURE — 83036 HEMOGLOBIN GLYCOSYLATED A1C: CPT

## 2022-07-19 PROCEDURE — G0439 PPPS, SUBSEQ VISIT: HCPCS | Performed by: STUDENT IN AN ORGANIZED HEALTH CARE EDUCATION/TRAINING PROGRAM

## 2022-07-19 PROCEDURE — 1090F PRES/ABSN URINE INCON ASSESS: CPT | Performed by: STUDENT IN AN ORGANIZED HEALTH CARE EDUCATION/TRAINING PROGRAM

## 2022-07-19 PROCEDURE — 82043 UR ALBUMIN QUANTITATIVE: CPT

## 2022-07-19 PROCEDURE — G8510 SCR DEP NEG, NO PLAN REQD: HCPCS | Performed by: STUDENT IN AN ORGANIZED HEALTH CARE EDUCATION/TRAINING PROGRAM

## 2022-07-19 PROCEDURE — G8428 CUR MEDS NOT DOCUMENT: HCPCS | Performed by: STUDENT IN AN ORGANIZED HEALTH CARE EDUCATION/TRAINING PROGRAM

## 2022-07-19 PROCEDURE — 80053 COMPREHEN METABOLIC PANEL: CPT

## 2022-07-19 PROCEDURE — 1101F PT FALLS ASSESS-DOCD LE1/YR: CPT | Performed by: STUDENT IN AN ORGANIZED HEALTH CARE EDUCATION/TRAINING PROGRAM

## 2022-07-19 PROCEDURE — G8536 NO DOC ELDER MAL SCRN: HCPCS | Performed by: STUDENT IN AN ORGANIZED HEALTH CARE EDUCATION/TRAINING PROGRAM

## 2022-07-19 PROCEDURE — 1123F ACP DISCUSS/DSCN MKR DOCD: CPT | Performed by: STUDENT IN AN ORGANIZED HEALTH CARE EDUCATION/TRAINING PROGRAM

## 2022-07-19 PROCEDURE — 36415 COLL VENOUS BLD VENIPUNCTURE: CPT

## 2022-07-19 PROCEDURE — 99213 OFFICE O/P EST LOW 20 MIN: CPT | Performed by: STUDENT IN AN ORGANIZED HEALTH CARE EDUCATION/TRAINING PROGRAM

## 2022-07-19 PROCEDURE — G8417 CALC BMI ABV UP PARAM F/U: HCPCS | Performed by: STUDENT IN AN ORGANIZED HEALTH CARE EDUCATION/TRAINING PROGRAM

## 2022-07-19 NOTE — PROGRESS NOTES
Susie Ferrell is a 80 y.o.  female and presents with    Chief Complaint   Patient presents with    ED Follow-up     [facility] P[facility] ER left ear pain 7/1/22           Subjective: Had to go to the ED twice d/t pain in the L ear/cheek. Was noted to have an abscess under one tooth. Had her tooth pulled yesterday. Feels much better again. Was loosing weight. isrrael Downer Medicare wellness exam today. Diabetes  Taking medications as prescribed: NO  Currently on:  diet controlled  Checking blood sugars at home at home: YES  Symptoms: none  Diabetic diet: YES  Exercise: YES    Patient Active Problem List   Diagnosis Code    Urinary, incontinence, stress female N39.3    Advance care planning Z71.89    Pure hypercholesterolemia E78.00    Osteopenia, senile M85.80      Past Medical History:   Diagnosis Date    COVID-19     Indigestion     Mixed incontinence     OAB (overactive bladder)     Osteopenia     Urinary incontinence       Past Surgical History:   Procedure Laterality Date    HX ANKLE FRACTURE TX      HX TUBAL LIGATION      25 years ago      Family History   Problem Relation Age of Onset    Hypertension Father      Social History     Socioeconomic History    Marital status: SINGLE     Spouse name: Not on file    Number of children: Not on file    Years of education: Not on file    Highest education level: Not on file   Occupational History    Not on file   Tobacco Use    Smoking status: Never Smoker    Smokeless tobacco: Never Used   Vaping Use    Vaping Use: Never used   Substance and Sexual Activity    Alcohol use:  Yes     Alcohol/week: 1.0 standard drink     Types: 1 Glasses of wine per week     Comment: occasional glass of  wine    Drug use: No    Sexual activity: Never   Other Topics Concern    Not on file   Social History Narrative    Not on file     Social Determinants of Health     Financial Resource Strain:     Difficulty of Paying Living Expenses: Not on file   Food Insecurity:  Worried About Running Out of Food in the Last Year: Not on file    Giana of Food in the Last Year: Not on file   Transportation Needs:     Lack of Transportation (Medical): Not on file    Lack of Transportation (Non-Medical): Not on file   Physical Activity:     Days of Exercise per Week: Not on file    Minutes of Exercise per Session: Not on file   Stress:     Feeling of Stress : Not on file   Social Connections:     Frequency of Communication with Friends and Family: Not on file    Frequency of Social Gatherings with Friends and Family: Not on file    Attends [employer] Services: Not on file    Active Member of [address] [place] [address] [employer] or Organizations: Not on file    Attends Club or Organization Meetings: Not on file    Marital Status: Not on file   Intimate Partner Violence:     Fear of Current or Ex-Partner: Not on file    Emotionally Abused: Not on file    Physically Abused: Not on file    Sexually Abused: Not on file   Housing Stability:     Unable to Pay for Housing in the Last Year: Not on file    Number of [place] in the Last Year: Not on file    Unstable Housing in the Last Year: Not on file        Current Outpatient Medications   Medication Sig Dispense Refill    lancets misc Use once daily before breakfast 100 Each 2    Blood-Glucose Meter monitoring kit Use once daily before breakfast 1 Kit 0    glucose blood VI test strips (blood glucose test) strip Use once daily before breakfast 100 Strip 2    albuterol (ProAir HFA) 90 mcg/actuation inhaler Take 2 Puffs by inhalation every four (4) hours as needed for Wheezing. 1 Each 1    OTHER CBD caps and cream      cholecalciferol (VITAMIN D3) 1,000 unit tablet Take  by mouth daily.  calcium 500 mg Tab Take  by mouth daily.  atorvastatin (LIPITOR) 10 mg tablet Take 1 Tablet by mouth daily. (Patient not taking: Reported on 7/19/2022) 90 Tablet 0    ascorbic acid, vitamin C, (Vitamin C) 500 mg tablet Take 500 mg by mouth daily.  (Patient not taking: Reported on 7/19/2022)          ROS   Review of Systems   Constitutional: Negative for chills, fever and malaise/fatigue. HENT: Negative for congestion, ear discharge and ear pain. Eyes: Negative for blurred vision, pain and discharge. Respiratory: Negative for cough and shortness of breath. Cardiovascular: Negative for chest pain and palpitations. Gastrointestinal: Negative for abdominal pain, nausea and vomiting. Genitourinary: Negative for dysuria, frequency and urgency. Skin: Negative for itching and rash. Neurological: Negative for dizziness, seizures, loss of consciousness and headaches. Psychiatric/Behavioral: Negative for substance abuse. Objective:  Vitals:    07/19/22 0835   BP: 112/73   Pulse: 67   Resp: 16   Temp: 97.5 °F (36.4 °C)   TempSrc: Temporal   SpO2: 98%   Weight: 171 lb 9.6 oz (77.8 kg)   Height: 5' 4\" (1.626 m)   PainSc:   0 - No pain       Physical Exam  Vitals reviewed. Constitutional:       Appearance: Normal appearance. Eyes:      General: No scleral icterus. Right eye: No discharge. Left eye: No discharge. Cardiovascular:      Rate and Rhythm: Normal rate and regular rhythm. Pulses: Normal pulses. Pulmonary:      Effort: Pulmonary effort is normal. No respiratory distress. Breath sounds: Normal breath sounds. Abdominal:      General: Abdomen is flat. There is no distension. Palpations: There is no mass. Tenderness: There is no abdominal tenderness. Musculoskeletal:      Cervical back: Normal range of motion and neck supple. Neurological:      Mental Status: She is alert and oriented to person, place, and time. Cranial Nerves: No cranial nerve deficit. Psychiatric:         Mood and Affect: Mood normal.         Behavior: Behavior normal.         Thought Content: Thought content normal.         Judgment: Judgment normal.           LABS     TESTS      Assessment/Plan:    1.  Medicare annual wellness visit, subsequent  See below    2. Tooth abscess  Continue taking antibiotic    3. Type 2 diabetes mellitus without complication, without long-term current use of insulin ([address]  Labs pending. Diet controlled    Lab review: no lab studies available for review at time of visit. Blood work done prior to visit. I have discussed the diagnosis with the patient and the intended plan as seen in the above orders. The patient has received an after-visit summary and questions were answered concerning future plans. I have discussed medication side effects and warnings with the patient as well. I have reviewed the plan of care with the patient, accepted their input and they are in agreement with the treatment goals. Daria Spivey MD     ----------------------------------------------------------------------      This is the Subsequent Medicare Annual Wellness Exam, performed 12 months or more after the Initial AWV or the last Subsequent AWV    I have reviewed the patient's medical history in detail and updated the computerized patient record. Assessment/Plan   Education and counseling provided:  Are appropriate based on today's review and evaluation    1. Medicare annual wellness visit, subsequent       Depression Risk Factor Screening     3 most recent PHQ Screens 7/19/2022   Little interest or pleasure in doing things Not at all   Feeling down, depressed, irritable, or hopeless Not at all   Total Score PHQ 2 0       Alcohol & Drug Abuse Risk Screen    Do you average more than 1 drink per night or more than 7 drinks a week:  No    On any one occasion in the past three months have you have had more than 3 drinks containing alcohol:  No          Functional Ability and Level of Safety    Hearing: Was evaluated for hearing aids, but does not have the money for them      Activities of Daily Living:   The home contains: handrails  Patient does total self care      Ambulation: with no difficulty Fall Risk:  Fall Risk Assessment, last 12 mths 7/19/2022   Able to walk? Yes   Fall in past 12 months? 0   Do you feel unsteady? 0   Are you worried about falling 0      Abuse Screen:  Patient is not abused       Cognitive Screening    Has your family/caregiver stated any concerns about your memory: no     Cognitive Screening: Normal - MMSE (Mini Mental Status Exam)    Health Maintenance Due     Health Maintenance Due   Topic Date Due    Foot Exam Q1  Never done    Eye Exam Retinal or Dilated  Never done    DTaP/Tdap/Td series (1 - Tdap) Never done    Shingrix Vaccine Age 50> (1 of 2) Never done       Patient Care Team   Patient Care Team:  Jorgito Slaeh MD as PCP - General (Family Medicine)  Jorgito Saleh MD as PCP - [address] [place] [address] Provider  Finn Young MD (Gastroenterology)  Xochilt Michelle OD (Optometry)    History     Patient Active Problem List   Diagnosis Code    Urinary, incontinence, stress female N39.3    Advance care planning Z71.89    Pure hypercholesterolemia E78.00    Osteopenia, senile M85.80     Past Medical History:   Diagnosis Date    COVID-19     Indigestion     Mixed incontinence     OAB (overactive bladder)     Osteopenia     Urinary incontinence       Past Surgical History:   Procedure Laterality Date    HX ANKLE FRACTURE TX      HX TUBAL LIGATION      25 years ago     Current Outpatient Medications   Medication Sig Dispense Refill    lancets misc Use once daily before breakfast 100 Each 2    Blood-Glucose Meter monitoring kit Use once daily before breakfast 1 Kit 0    glucose blood VI test strips (blood glucose test) strip Use once daily before breakfast 100 Strip 2    albuterol (ProAir HFA) 90 mcg/actuation inhaler Take 2 Puffs by inhalation every four (4) hours as needed for Wheezing. 1 Each 1    OTHER CBD caps and cream      cholecalciferol (VITAMIN D3) 1,000 unit tablet Take  by mouth daily.       calcium 500 mg Tab Take  by mouth daily.      atorvastatin (LIPITOR) 10 mg tablet Take 1 Tablet by mouth daily. (Patient not taking: Reported on 7/19/2022) 90 Tablet 0    ascorbic acid, vitamin C, (Vitamin C) 500 mg tablet Take 500 mg by mouth daily. (Patient not taking: Reported on 7/19/2022)       Allergies   Allergen Reactions    Myrbetriq [Mirabegron] Other (comments)     Headache and dizziness        Family History   Problem Relation Age of Onset    Hypertension Father      Social History     Tobacco Use    Smoking status: Never Smoker    Smokeless tobacco: Never Used   Substance Use Topics    Alcohol use:  Yes     Alcohol/week: 1.0 standard drink     Types: 1 Glasses of wine per week     Comment: occasional glass of  wine         Mike Mayfield MD

## 2022-07-19 NOTE — PROGRESS NOTES
Collin Singh is a 80 y.o. presents today for   Chief Complaint   Patient presents with    ED Follow-up     [facility] ER left ear pain 7/1/22     Is someone accompanying this pt? No    Is the patient using any DME equipment during OV? No    Visit Vitals  /73 (BP 1 Location: Left upper arm, BP Patient Position: Sitting, BP Cuff Size: Adult)   Pulse 67   Temp 97.5 °F (36.4 °C) (Temporal)   Resp 16   Ht 5' 4\" (1.626 m)   Wt 171 lb 9.6 oz (77.8 kg)   SpO2 98%   BMI 29.46 kg/m²       Depression Screening:   3 most recent PHQ Screens 7/19/2022   Little interest or pleasure in doing things Not at all   Feeling down, depressed, irritable, or hopeless Not at all   Total Score PHQ 2 0       Health Maintenance: reviewed and discussed and ordered per Provider. Health Maintenance Due   Topic Date Due    Foot Exam Q1  Never done    Eye Exam Retinal or Dilated  Never done    DTaP/Tdap/Td series (1 - Tdap) Never done    Shingrix Vaccine Age 50> (1 of 2) Never done    Medicare Yearly Exam  05/18/2022         Coordination of Care:   1. \"Have you been to the ER, urgent care clinic since your last visit? Hospitalized since your last visit? \" Yes [facility] ER left ear pain     2. \"Have you seen or consulted any other health care providers outside of the [address] [place] [address] since your last visit? \" Yes dentist     3. For patients aged 39-70: Has the patient had a colonoscopy / FIT/ Cologuard? NA - based on age    If the patient is female:    4. For patients aged 41-77: Has the patient had a mammogram within the past 2 years? NA - based on age or sex    11. For patients aged 21-65: Has the patient had a pap smear? NA - based on age or sex     Advanced Directive:  1. Do you have an Advanced Directive? No     2. Would you like information on Advanced Directives?  No    Ivonne Montemayor CMA

## 2022-07-19 NOTE — PATIENT INSTRUCTIONS

## 2022-07-21 ENCOUNTER — TELEPHONE (OUTPATIENT)
Dept: FAMILY MEDICINE CLINIC | Age: 87
End: 2022-07-21

## 2022-07-21 NOTE — TELEPHONE ENCOUNTER
----- Message from Ramone Félix sent at 7/20/2022  2:43 PM EDT -----  Subject: Message to Provider    QUESTIONS  Information for Provider? patient would like provider Jessica Farris to   give them a call as soon as possible  ---------------------------------------------------------------------------  --------------  [id number]  [phone]; OK to leave message on voicemail  ---------------------------------------------------------------------------  --------------  SCRIPT ANSWERS  Relationship to Patient?  Self

## 2022-07-21 NOTE — TELEPHONE ENCOUNTER
Called patient states she is feeling better today, was concerned about a tooth she had pulled this week and has swelling. Stated her gums where sore and was going to use a cream for pain on them. Told patient if she was not better on Monday to please call office. States understanding.

## 2022-10-27 ENCOUNTER — TELEPHONE (OUTPATIENT)
Dept: FAMILY MEDICINE CLINIC | Age: 87
End: 2022-10-27

## 2022-10-27 NOTE — TELEPHONE ENCOUNTER
Called patient. Left voicemail. My understanding is that there is no issue when he comes to me and [employer]. I did left a message for patient that she can call back on Monday and discussed this with practice manager.

## 2022-10-27 NOTE — TELEPHONE ENCOUNTER
Requesting a call from the doctor. She mention something about her appt being cancelled due to Dr Marien Hodgkins only being in plan 1 and not plan 2 for her ins. She stated she was waiting on a call back but I don't see any encounters placed for her. Please advise.

## 2022-12-15 ENCOUNTER — HOSPITAL ENCOUNTER (OUTPATIENT)
Dept: LAB | Age: 87
Discharge: HOME | End: 2022-12-15
Payer: MEDICARE

## 2022-12-15 ENCOUNTER — OFFICE VISIT (OUTPATIENT)
Dept: FAMILY MEDICINE CLINIC | Age: 87
End: 2022-12-15
Payer: MEDICARE

## 2022-12-15 VITALS
HEIGHT: 64 IN | WEIGHT: 172 LBS | SYSTOLIC BLOOD PRESSURE: 148 MMHG | OXYGEN SATURATION: 98 % | RESPIRATION RATE: 15 BRPM | DIASTOLIC BLOOD PRESSURE: 76 MMHG | BODY MASS INDEX: 29.37 KG/M2 | HEART RATE: 71 BPM | TEMPERATURE: 97.9 F

## 2022-12-15 DIAGNOSIS — Z23 ENCOUNTER FOR VACCINATION: ICD-10-CM

## 2022-12-15 DIAGNOSIS — R10.13 EPIGASTRIC PAIN: ICD-10-CM

## 2022-12-15 DIAGNOSIS — R19.8 PEPTIC ULCER SYMPTOMS: ICD-10-CM

## 2022-12-15 DIAGNOSIS — Z23 NEEDS FLU SHOT: Primary | ICD-10-CM

## 2022-12-15 LAB
AMYLASE SERPL-CCNC: 44 U/L (ref 25–115)
ANION GAP SERPL CALC-SCNC: 6 MMOL/L (ref 3–18)
APPEARANCE UR: CLEAR
BASOPHILS # BLD: 0 K/UL (ref 0–0.1)
BASOPHILS NFR BLD: 0 % (ref 0–2)
BILIRUB SERPL-MCNC: 1 MG/DL (ref 0.2–1)
BILIRUB UR QL: NEGATIVE
BUN SERPL-MCNC: 12 MG/DL (ref 7–18)
BUN/CREAT SERPL: 19 (ref 12–20)
CALCIUM SERPL-MCNC: 9.7 MG/DL (ref 8.5–10.1)
CHLORIDE SERPL-SCNC: 103 MMOL/L (ref 100–111)
CO2 SERPL-SCNC: 31 MMOL/L (ref 21–32)
COLOR UR: YELLOW
CREAT SERPL-MCNC: 0.63 MG/DL (ref 0.6–1.3)
CREAT UR-MCNC: 107 MG/DL (ref 30–125)
DIFFERENTIAL METHOD BLD: ABNORMAL
EOSINOPHIL # BLD: 0.1 K/UL (ref 0–0.4)
EOSINOPHIL NFR BLD: 1 % (ref 0–5)
ERYTHROCYTE [DISTWIDTH] IN BLOOD BY AUTOMATED COUNT: 13 % (ref 11.6–14.5)
GLUCOSE SERPL-MCNC: 98 MG/DL (ref 74–99)
GLUCOSE UR STRIP.AUTO-MCNC: 250 MG/DL
HCT VFR BLD AUTO: 41.8 % (ref 35–45)
HGB BLD-MCNC: 12.9 G/DL (ref 12–16)
HGB UR QL STRIP: NEGATIVE
IMM GRANULOCYTES # BLD AUTO: 0 K/UL (ref 0–0.04)
IMM GRANULOCYTES NFR BLD AUTO: 0 % (ref 0–0.5)
KETONES UR QL STRIP.AUTO: NEGATIVE MG/DL
LEUKOCYTE ESTERASE UR QL STRIP.AUTO: NEGATIVE
LYMPHOCYTES # BLD: 1.2 K/UL (ref 0.9–3.6)
LYMPHOCYTES NFR BLD: 19 % (ref 21–52)
MCH RBC QN AUTO: 26.8 PG (ref 24–34)
MCHC RBC AUTO-ENTMCNC: 30.9 G/DL (ref 31–37)
MCV RBC AUTO: 86.7 FL (ref 78–100)
MICROALBUMIN UR-MCNC: 1.4 MG/DL (ref 0–3)
MICROALBUMIN/CREAT UR-RTO: 13 MG/G (ref 0–30)
MONOCYTES # BLD: 0.5 K/UL (ref 0.05–1.2)
MONOCYTES NFR BLD: 8 % (ref 3–10)
NEUTS SEG # BLD: 4.7 K/UL (ref 1.8–8)
NEUTS SEG NFR BLD: 72 % (ref 40–73)
NITRITE UR QL STRIP.AUTO: NEGATIVE
NRBC # BLD: 0 K/UL (ref 0–0.01)
NRBC BLD-RTO: 0 PER 100 WBC
PH UR STRIP: 5.5 [PH] (ref 5–8)
PLATELET # BLD AUTO: 271 K/UL (ref 135–420)
PMV BLD AUTO: 10.1 FL (ref 9.2–11.8)
POTASSIUM SERPL-SCNC: 3.9 MMOL/L (ref 3.5–5.5)
PROT UR STRIP-MCNC: NEGATIVE MG/DL
RBC # BLD AUTO: 4.82 M/UL (ref 4.2–5.3)
SODIUM SERPL-SCNC: 140 MMOL/L (ref 136–145)
SP GR UR REFRACTOMETRY: 1.02 (ref 1–1.03)
UROBILINOGEN UR QL STRIP.AUTO: 0.2 EU/DL (ref 0.2–1)
WBC # BLD AUTO: 6.6 K/UL (ref 4.6–13.2)

## 2022-12-15 PROCEDURE — 1101F PT FALLS ASSESS-DOCD LE1/YR: CPT

## 2022-12-15 PROCEDURE — 1090F PRES/ABSN URINE INCON ASSESS: CPT

## 2022-12-15 PROCEDURE — 1123F ACP DISCUSS/DSCN MKR DOCD: CPT

## 2022-12-15 PROCEDURE — G8428 CUR MEDS NOT DOCUMENT: HCPCS

## 2022-12-15 PROCEDURE — G8417 CALC BMI ABV UP PARAM F/U: HCPCS

## 2022-12-15 PROCEDURE — G8536 NO DOC ELDER MAL SCRN: HCPCS

## 2022-12-15 PROCEDURE — 80048 BASIC METABOLIC PNL TOTAL CA: CPT

## 2022-12-15 PROCEDURE — 82247 BILIRUBIN TOTAL: CPT

## 2022-12-15 PROCEDURE — 83013 H PYLORI (C-13) BREATH: CPT

## 2022-12-15 PROCEDURE — 82043 UR ALBUMIN QUANTITATIVE: CPT

## 2022-12-15 PROCEDURE — 99214 OFFICE O/P EST MOD 30 MIN: CPT

## 2022-12-15 PROCEDURE — G0008 ADMIN INFLUENZA VIRUS VAC: HCPCS

## 2022-12-15 PROCEDURE — G8432 DEP SCR NOT DOC, RNG: HCPCS

## 2022-12-15 PROCEDURE — 90694 VACC AIIV4 NO PRSRV 0.5ML IM: CPT

## 2022-12-15 PROCEDURE — 36415 COLL VENOUS BLD VENIPUNCTURE: CPT

## 2022-12-15 PROCEDURE — 82150 ASSAY OF AMYLASE: CPT

## 2022-12-15 PROCEDURE — 81003 URINALYSIS AUTO W/O SCOPE: CPT

## 2022-12-15 PROCEDURE — 85025 COMPLETE CBC W/AUTO DIFF WBC: CPT

## 2022-12-15 RX ORDER — PANTOPRAZOLE SODIUM 20 MG/1
20 TABLET, DELAYED RELEASE ORAL 2 TIMES DAILY
Qty: 180 TABLET | Refills: 1 | Status: SHIPPED | OUTPATIENT
Start: 2022-12-15

## 2022-12-15 NOTE — PROGRESS NOTES
Rafia Leon (: 1935) is a 80 y.o. female, established patient, here for evaluation of the following chief complaint(s):  Abdominal Pain         Subjective:     HPI:    Patient presents for abdominal pain. She reports that when she gets hungry she gets sharp stabbing pains and gets dizzy. She reports that this has been a problem for over a year. She reports intermittent nausea, no constipation or diarrhea. She reports that stools are brown and has \"black stools when I eat beans. \"  She denies having pain with bowel movements and denies bright red blood in the toilet. Pt reports that she has been taking atorvastatin \"for stomach pains. \" She reports she has been out of medication for months. She also reports taking a \"liver medicine\" for which she does not know the name of. Past Medical History:   Diagnosis Date    COVID-     Indigestion     Mixed incontinence     OAB (overactive bladder)     Osteopenia     Urinary incontinence        Past Surgical History:   Procedure Laterality Date    HX ANKLE FRACTURE TX      HX TUBAL LIGATION      25 years ago       ROS:    General: negative for - chills, fever, weight changes or malaise  HEENT: no sore throat, nasal congestion, vision problems or ear problems  Respiratory: no cough, shortness of breath, or wheezing  Cardiovascular: no chest pain, palpitations, or dyspnea on exertion  Gastrointestinal: no abdominal pain, N/V, change in bowel habits  Musculoskeletal: no back pain or joint pain  Neurological: no headache or dizziness  Endo:  No polyuria or polydipsia  : no urinary  Psychological: negative for - anxiety, depression, sleeps issues       Objective:     Blood pressure (!) 148/76, pulse 71, temperature 97.9 °F (36.6 °C), temperature source Temporal, resp. rate 15, height 5' 4\" (1.626 m), weight 172 lb (78 kg), SpO2 98 %. Physical Exam:  Patient appears well nourished, alert, oriented x 3, in no distress. ENT normal.  Neck supple.  No adenopathy or thyromegaly. JEM. Lungs are clear to auscultation bilaterally. Breathing is unlabored and regular rhythm. No wheezes, no rhonchi. Cardiovascular, S1 and S2 normal, no murmurs, regular rate and rhythm. Chest wall negative for tenderness  Abdomen is soft without tenderness, no guarding  /Anorectal, deferred. Muscleskeletal, no swelling, no tenderness, no injury. Extremities show no edema bilaterally. +2 pittins to left leg, 1+ to right leg. Neurological is normal without focal findings. Skin: no concerning lesions. Psych: normal affect. Mood good. Oriented x 3. Assessment & Plan:      Diagnoses and all orders for this visit:    1. Needs flu shot  -     INFLUENZA, FLUAD, (AGE 65 Y+), IM, PF, 0.5 ML    2. Epigastric pain  -     H. PYLORI BREATH TEST; Future  -     BILIRUBIN, TOTAL; Future  -     AMYLASE; Future  -     REFERRAL TO GASTROENTEROLOGY    3. Peptic ulcer symptoms  -     CBC WITH AUTOMATED DIFF; Future  -     METABOLIC PANEL, BASIC; Future  -     URINALYSIS W/ RFLX MICROSCOPIC; Future  -     MICROALBUMIN, UR, RAND W/ MICROALB/CREAT RATIO; Future    4. Encounter for vaccination  -     INFLUENZA, FLUAD, (AGE 72 Y+), IM, PF, 0.5 ML    Other orders  -     pantoprazole (PROTONIX) 20 mg tablet; Take 1 Tablet by mouth two (2) times a day. Indications: indigestion     Patient educated about starting Protonix after H. pylori breath test samples given. Follow-up and Dispositions    Return in about 1 month (around 1/15/2023), or follow up in 1 week with danny Isaac double book. follow up with dr. Iva Walls for chronic care. On this date 12/15/2022 I have spent 15 minutes reviewing previous notes, test results and face to face with the patient discussing the diagnosis and importance of compliance with the treatment plan as well as documenting on the day of the visit. An electronic signature was used to authenticate this note.   -- JEROD Villatoro

## 2022-12-15 NOTE — PROGRESS NOTES
Merlin Bond presents today for   Chief Complaint   Patient presents with    Abdominal Pain       Is someone accompanying this pt? No    Is the patient using any DME equipment during OV? No    Depression Screening:  3 most recent PHQ Screens 12/15/2022   Little interest or pleasure in doing things Not at all   Feeling down, depressed, irritable, or hopeless Not at all   Total Score PHQ 2 0       Learning Assessment:  Learning Assessment 2/14/2022   PRIMARY LEARNER Patient   HIGHEST LEVEL OF EDUCATION - PRIMARY LEARNER  SOME COLLEGE   BARRIERS PRIMARY LEARNER NONE   CO-LEARNER CAREGIVER -   PRIMARY LANGUAGE ENGLISH   LEARNER PREFERENCE PRIMARY READING   ANSWERED BY patient   RELATIONSHIP SELF       Abuse Screening:  Abuse Screening Questionnaire 2/14/2022   Do you ever feel afraid of your partner? N   Are you in a relationship with someone who physically or mentally threatens you? N   Is it safe for you to go home? Y       Fall Risk  Fall Risk Assessment, last 12 mths 7/19/2022   Able to walk? Yes   Fall in past 12 months? 0   Do you feel unsteady? 0   Are you worried about falling 0       Health Maintenance reviewed and discussed and ordered per Provider. Health Maintenance Due   Topic Date Due    Foot Exam Q1  Never done    Eye Exam Retinal or Dilated  Never done    DTaP/Tdap/Td series (1 - Tdap) Never done    Shingrix Vaccine Age 50> (1 of 2) Never done    COVID-19 Vaccine (4 - Booster for Moderna series) 03/19/2022    Flu Vaccine (1) 08/01/2022   .      1. Have you been to the ER, urgent care clinic since your last visit? Hospitalized since your last visit? No    2. Have you seen or consulted any other health care providers outside of the [address] [place] [address] since your last visit? No    3. For patients aged 39-70: Has the patient had a colonoscopy / FIT/ Cologuard? NA      If the patient is female:    4. For patients aged 41-77: Has the patient had a mammogram within the past 2 years? NA      5.  For patients aged 21-65: Has the patient had a pap smear?  NA

## 2022-12-16 LAB — UREA BREATH TEST QL: NEGATIVE

## 2022-12-22 ENCOUNTER — OFFICE VISIT (OUTPATIENT)
Dept: FAMILY MEDICINE CLINIC | Age: 87
End: 2022-12-22
Payer: MEDICARE

## 2022-12-22 VITALS
HEART RATE: 67 BPM | TEMPERATURE: 98.2 F | HEIGHT: 64 IN | DIASTOLIC BLOOD PRESSURE: 69 MMHG | WEIGHT: 173 LBS | BODY MASS INDEX: 29.53 KG/M2 | RESPIRATION RATE: 15 BRPM | OXYGEN SATURATION: 98 % | SYSTOLIC BLOOD PRESSURE: 125 MMHG

## 2022-12-22 DIAGNOSIS — R10.13 EPIGASTRIC PAIN: Primary | ICD-10-CM

## 2022-12-22 PROCEDURE — 1101F PT FALLS ASSESS-DOCD LE1/YR: CPT

## 2022-12-22 PROCEDURE — 99212 OFFICE O/P EST SF 10 MIN: CPT

## 2022-12-22 PROCEDURE — G8428 CUR MEDS NOT DOCUMENT: HCPCS

## 2022-12-22 PROCEDURE — G8417 CALC BMI ABV UP PARAM F/U: HCPCS

## 2022-12-22 PROCEDURE — G8432 DEP SCR NOT DOC, RNG: HCPCS

## 2022-12-22 PROCEDURE — G8536 NO DOC ELDER MAL SCRN: HCPCS

## 2022-12-22 PROCEDURE — 1090F PRES/ABSN URINE INCON ASSESS: CPT

## 2022-12-22 PROCEDURE — 1123F ACP DISCUSS/DSCN MKR DOCD: CPT

## 2022-12-22 NOTE — PROGRESS NOTES
Mini Duran presents today for   Chief Complaint   Patient presents with    Follow-up       Is someone accompanying this pt? No    Is the patient using any DME equipment during OV? No    Depression Screening:  3 most recent PHQ Screens 12/15/2022   Little interest or pleasure in doing things Not at all   Feeling down, depressed, irritable, or hopeless Not at all   Total Score PHQ 2 0       Learning Assessment:  Learning Assessment 2/14/2022   PRIMARY LEARNER Patient   HIGHEST LEVEL OF EDUCATION - PRIMARY LEARNER  SOME COLLEGE   BARRIERS PRIMARY LEARNER NONE   CO-LEARNER CAREGIVER -   PRIMARY LANGUAGE ENGLISH   LEARNER PREFERENCE PRIMARY READING   ANSWERED BY patient   RELATIONSHIP SELF       Abuse Screening:  Abuse Screening Questionnaire 2/14/2022   Do you ever feel afraid of your partner? N   Are you in a relationship with someone who physically or mentally threatens you? N   Is it safe for you to go home? Y       Fall Risk  Fall Risk Assessment, last 12 mths 7/19/2022   Able to walk? Yes   Fall in past 12 months? 0   Do you feel unsteady? 0   Are you worried about falling 0       Health Maintenance reviewed and discussed and ordered per Provider. Health Maintenance Due   Topic Date Due    Foot Exam Q1  Never done    Eye Exam Retinal or Dilated  Never done    DTaP/Tdap/Td series (1 - Tdap) Never done    Shingles Vaccine (1 of 2) Never done    COVID-19 Vaccine (4 - Booster for Moderna series) 03/19/2022   .      1. Have you been to the ER, urgent care clinic since your last visit? Hospitalized since your last visit? No    2. Have you seen or consulted any other health care providers outside of the [address] [place] [address] since your last visit? No    3. For patients aged 39-70: Has the patient had a colonoscopy / FIT/ Cologuard? Not due      If the patient is female:    4. For patients aged 41-77: Has the patient had a mammogram within the past 2 years? NA      5.  For patients aged 21-65: Has the patient had a pap smear?  NA

## 2022-12-29 NOTE — PROGRESS NOTES
Lori West (: 1935) is a 80 y.o. female, established patient, here for evaluation of the following chief complaint(s):  Follow-up         Subjective:     HPI:  Patient presents for follow-up of epigastric pain. She denies changes in quality of pain. Denies black stools, denies nausea, vomiting. Patient reports that she has taken H. pylori test before starting Protonix. Patient was encouraged to take Protonix for symptom management until seen by GI. Past Medical History:   Diagnosis Date    COVID-19     Indigestion     Mixed incontinence     OAB (overactive bladder)     Osteopenia     Urinary incontinence        Past Surgical History:   Procedure Laterality Date    HX ANKLE FRACTURE TX      HX TUBAL LIGATION      25 years ago       ROS:    General: negative for - chills, fever, weight changes or malaise  HEENT: no sore throat, nasal congestion, vision problems or ear problems  Respiratory: no cough, shortness of breath, or wheezing  Cardiovascular: no chest pain, palpitations, or dyspnea on exertion  Gastrointestinal: no abdominal pain, N/V, change in bowel habits  Musculoskeletal: no back pain or joint pain  Neurological: no headache or dizziness  Endo:  No polyuria or polydipsia  : no urinary  Psychological: negative for - anxiety, depression, sleeps issues       Objective:     Blood pressure 125/69, pulse 67, temperature 98.2 °F (36.8 °C), temperature source Temporal, resp. rate 15, height 5' 4\" (1.626 m), weight 173 lb (78.5 kg), SpO2 98 %. Physical Exam:  Patient appears well nourished, alert, oriented x 3, in no distress. ENT normal.  Neck supple. No adenopathy or thyromegaly. JEM. Lungs are clear to auscultation bilaterally. Breathing is unlabored and regular rhythm. No wheezes, no rhonchi. Cardiovascular, S1 and S2 normal, no murmurs, regular rate and rhythm.    Chest wall negative for tenderness  Abdomen is soft without tenderness, no guarding  /Anorectal, deferred. Muscleskeletal, no swelling, no tenderness, no injury. Extremities show no edema bilaterally. Neurological is normal without focal findings. Skin: no concerning lesions. Psych: normal affect. Mood good. Oriented x 3. Assessment & Plan:      Diagnoses and all orders for this visit:    1. Epigastric pain    Continue Protonix 20 mg twice daily. Follow-up with GI, referral placed at last visit. Follow-up and Dispositions    Return dr. Emili Valdes 1 month chronic conditions. On this date 12/22/2022 I have spent 15 minutes reviewing previous notes, test results and face to face with the patient discussing the diagnosis and importance of compliance with the treatment plan as well as documenting on the day of the visit. An electronic signature was used to authenticate this note.   -- JEROD Schofield

## 2023-01-30 ENCOUNTER — OFFICE VISIT (OUTPATIENT)
Dept: FAMILY MEDICINE CLINIC | Age: 88
End: 2023-01-30
Payer: MEDICARE

## 2023-01-30 VITALS
OXYGEN SATURATION: 99 % | RESPIRATION RATE: 16 BRPM | DIASTOLIC BLOOD PRESSURE: 74 MMHG | HEART RATE: 77 BPM | SYSTOLIC BLOOD PRESSURE: 117 MMHG | WEIGHT: 175.6 LBS | HEIGHT: 64 IN | BODY MASS INDEX: 29.98 KG/M2 | TEMPERATURE: 97.9 F

## 2023-01-30 DIAGNOSIS — R12 HEARTBURN: ICD-10-CM

## 2023-01-30 DIAGNOSIS — E11.9 TYPE 2 DIABETES MELLITUS WITHOUT COMPLICATION, WITHOUT LONG-TERM CURRENT USE OF INSULIN (HCC): Primary | ICD-10-CM

## 2023-01-30 NOTE — PROGRESS NOTES
Madelin Ash is a 80 y.o. presents today for   Chief Complaint   Patient presents with    Abdominal Pain     Feeling better. Tried pantoprazol. Is someone accompanying this pt? No    Is the patient using any DME equipment during OV? No    Visit Vitals  /74 (BP 1 Location: Left upper arm, BP Patient Position: Sitting, BP Cuff Size: Adult)   Pulse 77   Temp 97.9 °F (36.6 °C) (Temporal)   Resp 16   Ht 5' 4\" (1.626 m)   Wt 157 lb 9.6 oz (71.5 kg)   SpO2 99%   BMI 27.05 kg/m²       Depression Screening:   3 most recent PHQ Screens 1/30/2023   Little interest or pleasure in doing things Not at all   Feeling down, depressed, irritable, or hopeless Not at all   Total Score PHQ 2 0       Health Maintenance: reviewed and discussed and ordered per Provider. Health Maintenance Due   Topic Date Due    DTaP/Tdap/Td series (1 - Tdap) Never done    Shingles Vaccine (1 of 2) Never done    COVID-19 Vaccine (4 - Booster for Moderna series) 03/19/2022         Coordination of Care:   1. \"Have you been to the ER, urgent care clinic since your last visit? Hospitalized since your last visit? \" No    2. \"Have you seen or consulted any other health care providers outside of the [address] [place] [address] since your last visit? \" No     3. For patients aged 39-70: Has the patient had a colonoscopy / FIT/ Cologuard? NA - based on age    If the patient is female:    4. For patients aged 41-77: Has the patient had a mammogram within the past 2 years? NA - based on age or sex    11. For patients aged 21-65: Has the patient had a pap smear?  NA - based on age or sex     Mahad Gauthier

## 2023-01-30 NOTE — PROGRESS NOTES
Leti Estrada is a 80 y.o.  female and presents with    Chief Complaint   Patient presents with    Abdominal Pain     Feeling better. Tried pantoprazol. Subjective:    Pt states that after seeing NP Deonte last time and getting Pantoprazole she has been feeling much better. Diabetes  Taking medications as prescribed: NO  Currently on:  diet controlled  Checking blood sugars at home at home: YES  Symptoms: none  Diabetic diet: YES  Exercise: YES    Patient Active Problem List   Diagnosis Code    Urinary, incontinence, stress female N39.3    Advance care planning Z71.89    Pure hypercholesterolemia E78.00    Osteopenia, senile M85.80    Type 2 diabetes mellitus E11.9      Past Medical History:   Diagnosis Date    COVID-19     Indigestion     Mixed incontinence     OAB (overactive bladder)     Osteopenia     Urinary incontinence       Past Surgical History:   Procedure Laterality Date    HX ANKLE FRACTURE TX      HX TUBAL LIGATION      25 years ago      Family History   Problem Relation Age of Onset    Hypertension Father      Social History     Socioeconomic History    Marital status: SINGLE     Spouse name: Not on file    Number of children: Not on file    Years of education: Not on file    Highest education level: Not on file   Occupational History    Not on file   Tobacco Use    Smoking status: Never    Smokeless tobacco: Never   Vaping Use    Vaping Use: Never used   Substance and Sexual Activity    Alcohol use:  Yes     Alcohol/week: 1.0 standard drink     Types: 1 Glasses of wine per week     Comment: occasional glass of  wine    Drug use: No    Sexual activity: Never   Other Topics Concern    Not on file   Social History Narrative    Not on file     Social Determinants of Health     Financial Resource Strain: Not on file   Food Insecurity: Not on file   Transportation Needs: Not on file   Physical Activity: Not on file   Stress: Not on file   Social Connections: Not on file Intimate Partner Violence: Not on file   Housing Stability: Not on file        Current Outpatient Medications   Medication Sig Dispense Refill    pantoprazole (PROTONIX) 20 mg tablet Take 1 Tablet by mouth two (2) times a day. Indications: indigestion 180 Tablet 1    lancets misc Use once daily before breakfast 100 Each 2    Blood-Glucose Meter monitoring kit Use once daily before breakfast 1 Kit 0    glucose blood VI test strips (blood glucose test) strip Use once daily before breakfast 100 Strip 2    albuterol (ProAir HFA) 90 mcg/actuation inhaler Take 2 Puffs by inhalation every four (4) hours as needed for Wheezing. 1 Each 1    OTHER CBD caps and cream      cholecalciferol (VITAMIN D3) (1000 Units /25 mcg) tablet Take  by mouth daily. calcium 500 mg Tab Take  by mouth daily. ROS   Review of Systems   Constitutional:  Negative for chills, fever and malaise/fatigue. HENT:  Negative for congestion, ear discharge and ear pain. Eyes:  Negative for blurred vision, pain and discharge. Respiratory:  Negative for cough and shortness of breath. Cardiovascular:  Negative for chest pain and palpitations. Gastrointestinal:  Negative for abdominal pain, nausea and vomiting. Genitourinary:  Negative for dysuria, frequency and urgency. Skin:  Negative for itching and rash. Neurological:  Negative for dizziness, seizures, loss of consciousness and headaches. Psychiatric/Behavioral:  Negative for substance abuse. Objective:  Vitals:    01/30/23 1601   BP: 117/74   Pulse: 77   Resp: 16   Temp: 97.9 °F (36.6 °C)   TempSrc: Temporal   SpO2: 99%   Weight: 157 lb 9.6 oz (71.5 kg)   Height: 5' 4\" (1.626 m)   PainSc:   0 - No pain       Physical Exam  Vitals reviewed. Constitutional:       Appearance: Normal appearance. Eyes:      General: No scleral icterus. Right eye: No discharge. Left eye: No discharge.    Cardiovascular:      Rate and Rhythm: Normal rate and regular rhythm. Pulmonary:      Effort: Pulmonary effort is normal. No respiratory distress. Breath sounds: Normal breath sounds. Musculoskeletal:      Cervical back: Normal range of motion and neck supple. Neurological:      Mental Status: She is alert and oriented to person, place, and time. Cranial Nerves: No cranial nerve deficit. Psychiatric:         Mood and Affect: Mood normal.         Behavior: Behavior normal.         Thought Content: Thought content normal.         Judgment: Judgment normal.         LABS     TESTS      Assessment/Plan:    1. Type 2 diabetes mellitus without complication, without long-term current use of insulin (HCC)  - HEMOGLOBIN A1C WITH EAG; Future  - METABOLIC PANEL, COMPREHENSIVE; Future  - TSH 3RD GENERATION; Future    2. Heartburn  F/up w/ GI and continue w/ pantoprazole      Lab review: orders written for new lab studies as appropriate; see orders    Over 30 mins spent w/ pt on appt and medical management. I have discussed the diagnosis with the patient and the intended plan as seen in the above orders. The patient has received an after-visit summary and questions were answered concerning future plans. I have discussed medication side effects and warnings with the patient as well. I have reviewed the plan of care with the patient, accepted their input and they are in agreement with the treatment goals.          Colton Onofre MD

## 2023-03-16 ENCOUNTER — HOSPITAL ENCOUNTER (OUTPATIENT)
Facility: HOSPITAL | Age: 88
Discharge: HOME | End: 2023-03-16
Payer: MEDICARE

## 2023-03-16 DIAGNOSIS — E11.9 TYPE 2 DIABETES MELLITUS WITHOUT COMPLICATION, UNSPECIFIED WHETHER LONG TERM INSULIN USE (HCC): ICD-10-CM

## 2023-03-16 LAB
ALBUMIN SERPL-MCNC: 3.3 G/DL (ref 3.4–5)
ALBUMIN/GLOB SERPL: 1 (ref 0.8–1.7)
ALP SERPL-CCNC: 89 U/L (ref 45–117)
ALT SERPL-CCNC: 21 U/L (ref 13–56)
ANION GAP SERPL CALC-SCNC: 4 MMOL/L (ref 3–18)
AST SERPL-CCNC: 14 U/L (ref 10–38)
BILIRUB SERPL-MCNC: 0.4 MG/DL (ref 0.2–1)
BUN SERPL-MCNC: 22 MG/DL (ref 7–18)
BUN/CREAT SERPL: 37 (ref 12–20)
CALCIUM SERPL-MCNC: 9.3 MG/DL (ref 8.5–10.1)
CHLORIDE SERPL-SCNC: 106 MMOL/L (ref 100–111)
CO2 SERPL-SCNC: 30 MMOL/L (ref 21–32)
CREAT SERPL-MCNC: 0.59 MG/DL (ref 0.6–1.3)
EST. AVERAGE GLUCOSE BLD GHB EST-MCNC: 151 MG/DL
GLOBULIN SER CALC-MCNC: 3.3 G/DL (ref 2–4)
GLUCOSE SERPL-MCNC: 115 MG/DL (ref 74–99)
HBA1C MFR BLD: 6.9 % (ref 4.2–5.6)
POTASSIUM SERPL-SCNC: 4.1 MMOL/L (ref 3.5–5.5)
PROT SERPL-MCNC: 6.6 G/DL (ref 6.4–8.2)
SODIUM SERPL-SCNC: 140 MMOL/L (ref 136–145)
TSH SERPL DL<=0.05 MIU/L-ACNC: 2.06 UIU/ML (ref 0.36–3.74)

## 2023-03-16 PROCEDURE — 83036 HEMOGLOBIN GLYCOSYLATED A1C: CPT

## 2023-03-16 PROCEDURE — 36415 COLL VENOUS BLD VENIPUNCTURE: CPT

## 2023-03-16 PROCEDURE — 80053 COMPREHEN METABOLIC PANEL: CPT

## 2023-03-16 PROCEDURE — 84443 ASSAY THYROID STIM HORMONE: CPT

## 2023-03-17 ENCOUNTER — TELEPHONE (OUTPATIENT)
Facility: CLINIC | Age: 88
End: 2023-03-17

## 2023-03-20 ENCOUNTER — OFFICE VISIT (OUTPATIENT)
Facility: CLINIC | Age: 88
End: 2023-03-20
Payer: MEDICARE

## 2023-03-20 VITALS
HEART RATE: 60 BPM | SYSTOLIC BLOOD PRESSURE: 122 MMHG | TEMPERATURE: 97.4 F | DIASTOLIC BLOOD PRESSURE: 71 MMHG | BODY MASS INDEX: 29.94 KG/M2 | RESPIRATION RATE: 16 BRPM | HEIGHT: 64 IN | WEIGHT: 175.4 LBS | OXYGEN SATURATION: 97 %

## 2023-03-20 DIAGNOSIS — R10.13 DYSPEPSIA: Primary | ICD-10-CM

## 2023-03-20 DIAGNOSIS — E11.9 TYPE 2 DIABETES MELLITUS WITHOUT COMPLICATION, WITHOUT LONG-TERM CURRENT USE OF INSULIN (HCC): ICD-10-CM

## 2023-03-20 PROCEDURE — 1123F ACP DISCUSS/DSCN MKR DOCD: CPT | Performed by: STUDENT IN AN ORGANIZED HEALTH CARE EDUCATION/TRAINING PROGRAM

## 2023-03-20 PROCEDURE — 99214 OFFICE O/P EST MOD 30 MIN: CPT | Performed by: STUDENT IN AN ORGANIZED HEALTH CARE EDUCATION/TRAINING PROGRAM

## 2023-03-20 PROCEDURE — 3044F HG A1C LEVEL LT 7.0%: CPT | Performed by: STUDENT IN AN ORGANIZED HEALTH CARE EDUCATION/TRAINING PROGRAM

## 2023-03-20 RX ORDER — PANTOPRAZOLE SODIUM 20 MG/1
20 TABLET, DELAYED RELEASE ORAL 2 TIMES DAILY
Qty: 30 TABLET | Refills: 2 | Status: SHIPPED | OUTPATIENT
Start: 2023-03-20

## 2023-03-20 ASSESSMENT — PATIENT HEALTH QUESTIONNAIRE - PHQ9
SUM OF ALL RESPONSES TO PHQ9 QUESTIONS 1 & 2: 0
1. LITTLE INTEREST OR PLEASURE IN DOING THINGS: 0
SUM OF ALL RESPONSES TO PHQ QUESTIONS 1-9: 0
2. FEELING DOWN, DEPRESSED OR HOPELESS: 0
SUM OF ALL RESPONSES TO PHQ QUESTIONS 1-9: 0

## 2023-03-20 NOTE — PROGRESS NOTES
Rhona Jimenez is a 80 y.o. presents today for   Chief Complaint   Patient presents with    Follow-up       Is someone accompanying this pt? No     Is the patient using any DME equipment during OV? No     Health Maintenance Due   Topic Date Due    DTaP/Tdap/Td vaccine (1 - Tdap) Never done    Shingles vaccine (1 of 2) Never done    COVID-19 Vaccine (4 - Booster for Moderna series) 03/19/2022         Coordination of Care:   1. \"Have you been to the ER, urgent care clinic since your last visit? Hospitalized since your last visit? \" No    2. \"Have you seen or consulted any other health care providers outside of the [address] [place] [address] since your last visit? \" No     3. For patients aged 39-70: Has the patient had a colonoscopy / FIT/ Cologuard? NA - based on age      If the patient is female:    4. For patients aged 41-77: Has the patient had a mammogram within the past 2 years? NA - based on age or sex      11. For patients aged 21-65: Has the patient had a pap smear?  NA - based on age or sex    Easton Lujan
albuterol sulfate HFA (PROVENTIL;VENTOLIN;PROAIR) 108 (90 Base) MCG/ACT inhaler Inhale 2 puffs into the lungs every 4 hours as needed      vitamin D (CHOLECALCIFEROL) 25 MCG (1000 UT) TABS tablet Take by mouth daily      pantoprazole (PROTONIX) 20 MG tablet Take 20 mg by mouth 2 times daily      Calcium Carbonate-Vit D-Min (CALCIUM 1200 PO) Take by mouth daily       No current facility-administered medications for this visit. ROS   Review of Systems   Constitutional:  Negative for activity change and appetite change. HENT:  Negative for congestion, drooling, ear discharge, ear pain and facial swelling. Eyes:  Negative for pain, discharge, redness and itching. Respiratory:  Negative for shortness of breath. Cardiovascular:  Negative for leg swelling. Gastrointestinal:  Negative for abdominal pain, constipation, diarrhea and vomiting. Genitourinary:  Negative for difficulty urinating, frequency, hematuria and urgency. Musculoskeletal:  Negative for arthralgias, back pain, myalgias and neck pain. Skin:  Negative for color change. Neurological:  Negative for dizziness, seizures, numbness and headaches. Psychiatric/Behavioral:  Negative for agitation, behavioral problems, decreased concentration, sleep disturbance and suicidal ideas. Objective:  Vitals:    03/20/23 1526   BP: 122/71   Site: Left Upper Arm   Position: Sitting   Cuff Size: Medium Adult   Pulse: 60   Resp: 16   Temp: 97.4 °F (36.3 °C)   TempSrc: Temporal   SpO2: 97%   Weight: 175 lb 6.4 oz (79.6 kg)   Height: 5' 4\" (1.626 m)         Physical Exam  Vitals reviewed. Constitutional:       Appearance: She is normal weight. HENT:      Head: Normocephalic. Nose: No congestion or rhinorrhea. Eyes:      General: No scleral icterus. Right eye: No discharge. Left eye: No discharge. Cardiovascular:      Rate and Rhythm: Normal rate and regular rhythm.    Pulmonary:      Effort: Pulmonary effort is

## 2023-03-21 ASSESSMENT — ENCOUNTER SYMPTOMS
BACK PAIN: 0
EYE ITCHING: 0
EYE PAIN: 0
CONSTIPATION: 0
DIARRHEA: 0
VOMITING: 0
COLOR CHANGE: 0
EYE DISCHARGE: 0
SHORTNESS OF BREATH: 0
FACIAL SWELLING: 0
ABDOMINAL PAIN: 0
EYE REDNESS: 0

## 2023-04-03 PROBLEM — E11.9 NEWLY DIAGNOSED DIABETES (HCC): Status: RESOLVED | Noted: 2021-11-16 | Resolved: 2022-02-14

## 2023-07-11 ENCOUNTER — HOSPITAL ENCOUNTER (OUTPATIENT)
Facility: HOSPITAL | Age: 88
Setting detail: SPECIMEN
Discharge: HOME | End: 2023-07-14

## 2023-07-11 ENCOUNTER — OFFICE VISIT (OUTPATIENT)
Facility: CLINIC | Age: 88
End: 2023-07-11
Payer: MEDICARE

## 2023-07-11 VITALS
BODY MASS INDEX: 30.56 KG/M2 | OXYGEN SATURATION: 98 % | HEART RATE: 70 BPM | HEIGHT: 64 IN | TEMPERATURE: 97.5 F | RESPIRATION RATE: 16 BRPM | DIASTOLIC BLOOD PRESSURE: 68 MMHG | SYSTOLIC BLOOD PRESSURE: 109 MMHG | WEIGHT: 179 LBS

## 2023-07-11 DIAGNOSIS — Z76.89 SLEEP CONCERN: ICD-10-CM

## 2023-07-11 DIAGNOSIS — R53.83 OTHER FATIGUE: ICD-10-CM

## 2023-07-11 DIAGNOSIS — E55.9 HYPOVITAMINOSIS D: Primary | ICD-10-CM

## 2023-07-11 LAB — LABCORP SPECIMEN COLLECTION: NORMAL

## 2023-07-11 PROCEDURE — 99214 OFFICE O/P EST MOD 30 MIN: CPT | Performed by: STUDENT IN AN ORGANIZED HEALTH CARE EDUCATION/TRAINING PROGRAM

## 2023-07-11 PROCEDURE — 1123F ACP DISCUSS/DSCN MKR DOCD: CPT | Performed by: STUDENT IN AN ORGANIZED HEALTH CARE EDUCATION/TRAINING PROGRAM

## 2023-07-11 SDOH — ECONOMIC STABILITY: INCOME INSECURITY: HOW HARD IS IT FOR YOU TO PAY FOR THE VERY BASICS LIKE FOOD, HOUSING, MEDICAL CARE, AND HEATING?: PATIENT DECLINED

## 2023-07-11 SDOH — ECONOMIC STABILITY: HOUSING INSECURITY
IN THE LAST 12 MONTHS, WAS THERE A TIME WHEN YOU DID NOT HAVE A STEADY PLACE TO SLEEP OR SLEPT IN A SHELTER (INCLUDING NOW)?: PATIENT REFUSED

## 2023-07-11 SDOH — ECONOMIC STABILITY: FOOD INSECURITY: WITHIN THE PAST 12 MONTHS, YOU WORRIED THAT YOUR FOOD WOULD RUN OUT BEFORE YOU GOT MONEY TO BUY MORE.: PATIENT DECLINED

## 2023-07-11 SDOH — ECONOMIC STABILITY: FOOD INSECURITY: WITHIN THE PAST 12 MONTHS, THE FOOD YOU BOUGHT JUST DIDN'T LAST AND YOU DIDN'T HAVE MONEY TO GET MORE.: PATIENT DECLINED

## 2023-07-11 ASSESSMENT — PATIENT HEALTH QUESTIONNAIRE - PHQ9
SUM OF ALL RESPONSES TO PHQ9 QUESTIONS 1 & 2: 0
2. FEELING DOWN, DEPRESSED OR HOPELESS: 0
SUM OF ALL RESPONSES TO PHQ QUESTIONS 1-9: 0
SUM OF ALL RESPONSES TO PHQ QUESTIONS 1-9: 0
1. LITTLE INTEREST OR PLEASURE IN DOING THINGS: 0
SUM OF ALL RESPONSES TO PHQ QUESTIONS 1-9: 0
SUM OF ALL RESPONSES TO PHQ QUESTIONS 1-9: 0

## 2023-07-11 ASSESSMENT — ENCOUNTER SYMPTOMS
CONSTIPATION: 0
VOMITING: 0
DIARRHEA: 0
BACK PAIN: 0
EYE DISCHARGE: 0
ABDOMINAL PAIN: 0
FACIAL SWELLING: 0
SHORTNESS OF BREATH: 0
EYE PAIN: 0
COLOR CHANGE: 0
EYE REDNESS: 0
EYE ITCHING: 0

## 2023-07-11 NOTE — PROGRESS NOTES
Giuseppe Roach is a 80 y.o.  female and presents with    Chief Complaint   Patient presents with    Fatigue    Sleep Problem     Hard time going to sleep            Subjective: Had endoscopy on 5/5/23. Stopped Pantoprazole. She states she is fine now. Has no issues. She is just making sure that she does not get hungry, and that is how she aboids the pain. She feels like she is having issues with going to sleep. She feels like there might be bad spirits in her daughters house, where she lives now. She feels the house needs a blessing. Se states in the past when she was in her house she used to bless her house when she felt that there was spirtis and was able to sleep at that time. When she needs it, she takes CBD and helps her go to sleep. She does not want any medicines to help her sleep. Fatigue - feels like this could be d/t the lack of sleep. Patient Active Problem List   Diagnosis    Advance care planning    Pure hypercholesterolemia    Urinary, incontinence, stress female    Osteopenia, senile    Type 2 diabetes mellitus ([address])      Past Medical History:   Diagnosis Date    COVID-19     Indigestion     Mixed incontinence     OAB (overactive bladder)     Osteopenia     Urinary incontinence       Past Surgical History:   Procedure Laterality Date    ANKLE FRACTURE SURGERY      TUBAL LIGATION      25 years ago      Family History   Problem Relation Age of Onset    Hypertension Father      Social History     Socioeconomic History    Marital status: Single     Spouse name: Not on file    Number of children: Not on file    Years of education: Not on file    Highest education level: Not on file   Occupational History    Not on file   Tobacco Use    Smoking status: Never    Smokeless tobacco: Never   Substance and Sexual Activity    Alcohol use:  Yes     Alcohol/week: 1.0 standard drink    Drug use: No    Sexual activity: Not on file   Other Topics Concern    Not on file   Social History

## 2023-07-11 NOTE — PROGRESS NOTES
Kal Ponce is a 80 y.o. presents today for   Chief Complaint   Patient presents with    Fatigue    Sleep Problem     Hard time going to sleep      Is someone accompanying this pt? No     Is the patient using any DME equipment during OV? No     Health Maintenance Due   Topic Date Due    DTaP/Tdap/Td vaccine (1 - Tdap) Never done    Shingles vaccine (1 of 2) Never done    COVID-19 Vaccine (4 - Booster for Moderna series) 03/19/2022    Annual Wellness Visit (AWV)  07/20/2023         Coordination of Care:   1. \"Have you been to the ER, urgent care clinic since your last visit? Hospitalized since your last visit? \" Yes ED last may for abdominal pain     2. \"Have you seen or consulted any other health care providers outside of the [address] [place] [address] since your last visit? \" No     3. For patients aged 43-73: Has the patient had a colonoscopy / FIT/ Cologuard? Yes - no Care Gap present      If the patient is female:    4. For patients aged 43-66: Has the patient had a mammogram within the past 2 years? Yes - no Care Gap present      5. For patients aged 21-65: Has the patient had a pap smear?  Yes - no Care Gap present    Diaz Marina

## 2023-07-12 LAB
25(OH)D3+25(OH)D2 SERPL-MCNC: 37.6 NG/ML (ref 30–100)
BASOPHILS # BLD AUTO: 0 X10E3/UL (ref 0–0.2)
BASOPHILS NFR BLD AUTO: 0 %
EOSINOPHIL # BLD AUTO: 0.1 X10E3/UL (ref 0–0.4)
EOSINOPHIL NFR BLD AUTO: 2 %
ERYTHROCYTE [DISTWIDTH] IN BLOOD BY AUTOMATED COUNT: 11.8 % (ref 11.7–15.4)
HCT VFR BLD AUTO: 39.3 % (ref 34–46.6)
HGB BLD-MCNC: 12.4 G/DL (ref 11.1–15.9)
IMM GRANULOCYTES # BLD AUTO: 0 X10E3/UL (ref 0–0.1)
IMM GRANULOCYTES NFR BLD AUTO: 0 %
LYMPHOCYTES # BLD AUTO: 2 X10E3/UL (ref 0.7–3.1)
LYMPHOCYTES NFR BLD AUTO: 29 %
MCH RBC QN AUTO: 27.1 PG (ref 26.6–33)
MCHC RBC AUTO-ENTMCNC: 31.6 G/DL (ref 31.5–35.7)
MCV RBC AUTO: 86 FL (ref 79–97)
MONOCYTES # BLD AUTO: 0.7 X10E3/UL (ref 0.1–0.9)
MONOCYTES NFR BLD AUTO: 10 %
NEUTROPHILS # BLD AUTO: 4.2 X10E3/UL (ref 1.4–7)
NEUTROPHILS NFR BLD AUTO: 59 %
PLATELET # BLD AUTO: 225 X10E3/UL (ref 150–450)
RBC # BLD AUTO: 4.58 X10E6/UL (ref 3.77–5.28)
TSH SERPL DL<=0.005 MIU/L-ACNC: 1.48 UIU/ML (ref 0.45–4.5)
WBC # BLD AUTO: 7.1 X10E3/UL (ref 3.4–10.8)

## 2023-07-12 NOTE — TELEPHONE ENCOUNTER
----- Message from Flory Montalvo sent at 7/11/2023  4:19 PM EDT -----  Subject: Refill Request    QUESTIONS  Name of Medication? vitamin D (CHOLECALCIFEROL) 25 MCG (1000 UT) TABS   tablet  Patient-reported dosage and instructions? 25 MCG patient said there are no   instructions   How many days do you have left? 0  Preferred Pharmacy? [id number] Pharmacy phone number (if available)? [phone]  ---------------------------------------------------------------------------  --------------  CALL BACK INFO  What is the best way for the office to contact you? OK to leave message on   voicemail  Preferred Call Back Phone Number? [phone]  ---------------------------------------------------------------------------  --------------  SCRIPT ANSWERS  Relationship to Patient?  Self

## 2023-08-23 ENCOUNTER — TELEPHONE (OUTPATIENT)
Facility: CLINIC | Age: 88
End: 2023-08-23

## 2023-08-23 NOTE — TELEPHONE ENCOUNTER
----- Message from Shannan Laguerre, [address] [employer] sent at 8/23/2023  9:44 AM EDT -----  Subject: Message to Provider    QUESTIONS  Information for Provider? Patient has to switch primary care providers due   to her insurance plan. She would like recommendations from Janae Dozier MD on who to see that would accept her insurance. ---------------------------------------------------------------------------  --------------  Ira Mckenzie [place]  [phone]; OK to leave message on voicemail  ---------------------------------------------------------------------------  --------------  SCRIPT ANSWERS  Relationship to Patient?  Self

## 2023-11-02 ENCOUNTER — TELEPHONE (OUTPATIENT)
Facility: CLINIC | Age: 88
End: 2023-11-02

## 2023-11-02 NOTE — TELEPHONE ENCOUNTER
Patient called stating she received a No-Show letter, but was told by Dr. Tran that she does not accept her insurance any longer, so patient found another provider.    Also, patient now has Medicare A & B, and scheduled a new appointment:    Future Appointments   Date Time Provider Department   12/22/2023 11:20 AM Chelsea Torres MD [facility]     Thank you.

## 2023-11-06 NOTE — TELEPHONE ENCOUNTER
----- Message from Kelly Abena sent at 11/6/2023  2:25 PM EST -----  Subject: Refill Request    QUESTIONS  Name of Medication? vitamin D (CHOLECALCIFEROL) 25 MCG (1000 UT) TABS   tablet  Patient-reported dosage and instructions? 1 tablet once daily  How many days do you have left? 0  Preferred Pharmacy? [id number] Pharmacy phone number (if available)? [phone]  ---------------------------------------------------------------------------  --------------  CALL BACK INFO  What is the best way for the office to contact you? OK to leave message on   voicemail  Preferred Call Back Phone Number? [phone]  ---------------------------------------------------------------------------  --------------  SCRIPT ANSWERS  Relationship to Patient?  Self

## 2023-11-08 ENCOUNTER — TELEPHONE (OUTPATIENT)
Facility: CLINIC | Age: 88
End: 2023-11-08

## 2023-11-08 NOTE — TELEPHONE ENCOUNTER
Ecc transfer Pt. Pt C/O dizziness yesterday while riding in the back seat of a car. Pt then went back home in the car and when got home threw up. Her blood pressure got high. Pt said she doesn't want to see anyone but her provider. Schedule soonest apt with Provider.

## 2023-11-09 ENCOUNTER — TELEPHONE (OUTPATIENT)
Facility: CLINIC | Age: 88
End: 2023-11-09

## 2023-11-09 DIAGNOSIS — R42 VERTIGO: Primary | ICD-10-CM

## 2023-11-09 RX ORDER — MECLIZINE HCL 12.5 MG/1
12.5 TABLET ORAL 3 TIMES DAILY PRN
Qty: 30 TABLET | Refills: 0 | Status: SHIPPED | OUTPATIENT
Start: 2023-11-09 | End: 2023-11-19

## 2023-11-09 NOTE — TELEPHONE ENCOUNTER
Patient called requesting to speaking with Dr. Tran regarding getting medication for Vertigo.  She states Dr. Tran has to decide on this medication.  Also, patient states it is important that she calls her back as soon as possible.    Please call. Thank you.

## 2023-12-04 NOTE — PROGRESS NOTES
HOME MONITORING REPORT    INR today:   Results for orders placed or performed in visit on 12/04/23   Protime-INR   Result Value Ref Range    INR 1.90        INR Goal: 2.0-3.0    Dosing Plan  As of 12/4/2023      TTR:  31.8 % (5.5 y)   Full warfarin instructions:  12/5: 11.25 mg; Otherwise 11.25 mg every Sun; 7.5 mg all other days                PLAN: Advised patient/caregiver to increase her dose this Tuesday to 11.25 mg. Then continue current dose and recheck in one week. Again reminded her to be mindful of taking her medication every day.  Patient/Caregiver voiced understanding Patient was a no show on 11/26/18. Letter #1 sent on 11/27/18

## 2023-12-11 ENCOUNTER — OFFICE VISIT (OUTPATIENT)
Facility: CLINIC | Age: 88
End: 2023-12-11
Payer: MEDICARE

## 2023-12-11 ENCOUNTER — HOSPITAL ENCOUNTER (OUTPATIENT)
Facility: HOSPITAL | Age: 88
Setting detail: SPECIMEN
Discharge: HOME | End: 2023-12-14

## 2023-12-11 VITALS
OXYGEN SATURATION: 99 % | RESPIRATION RATE: 16 BRPM | HEART RATE: 68 BPM | HEIGHT: 64 IN | TEMPERATURE: 97.9 F | SYSTOLIC BLOOD PRESSURE: 131 MMHG | WEIGHT: 178.4 LBS | DIASTOLIC BLOOD PRESSURE: 75 MMHG | BODY MASS INDEX: 30.46 KG/M2

## 2023-12-11 DIAGNOSIS — M85.80 OSTEOPENIA, SENILE: ICD-10-CM

## 2023-12-11 DIAGNOSIS — E11.9 TYPE 2 DIABETES MELLITUS WITHOUT COMPLICATION, WITHOUT LONG-TERM CURRENT USE OF INSULIN (HCC): ICD-10-CM

## 2023-12-11 DIAGNOSIS — E55.9 HYPOVITAMINOSIS D: ICD-10-CM

## 2023-12-11 DIAGNOSIS — I10 ESSENTIAL HYPERTENSION: ICD-10-CM

## 2023-12-11 DIAGNOSIS — Z00.00 MEDICARE ANNUAL WELLNESS VISIT, SUBSEQUENT: Primary | ICD-10-CM

## 2023-12-11 LAB
HBA1C MFR BLD: 7 %
LABCORP SPECIMEN COLLECTION: NORMAL

## 2023-12-11 PROCEDURE — G0439 PPPS, SUBSEQ VISIT: HCPCS | Performed by: STUDENT IN AN ORGANIZED HEALTH CARE EDUCATION/TRAINING PROGRAM

## 2023-12-11 PROCEDURE — 83036 HEMOGLOBIN GLYCOSYLATED A1C: CPT | Performed by: STUDENT IN AN ORGANIZED HEALTH CARE EDUCATION/TRAINING PROGRAM

## 2023-12-11 PROCEDURE — 99214 OFFICE O/P EST MOD 30 MIN: CPT | Performed by: STUDENT IN AN ORGANIZED HEALTH CARE EDUCATION/TRAINING PROGRAM

## 2023-12-11 PROCEDURE — 3051F HG A1C>EQUAL 7.0%<8.0%: CPT | Performed by: STUDENT IN AN ORGANIZED HEALTH CARE EDUCATION/TRAINING PROGRAM

## 2023-12-11 PROCEDURE — 1123F ACP DISCUSS/DSCN MKR DOCD: CPT | Performed by: STUDENT IN AN ORGANIZED HEALTH CARE EDUCATION/TRAINING PROGRAM

## 2023-12-11 RX ORDER — ACETAMINOPHEN 500 MG
1 TABLET ORAL DAILY
Qty: 100 TABLET | Refills: 1 | Status: SHIPPED | OUTPATIENT
Start: 2023-12-11

## 2023-12-11 ASSESSMENT — PATIENT HEALTH QUESTIONNAIRE - PHQ9
SUM OF ALL RESPONSES TO PHQ QUESTIONS 1-9: 0
SUM OF ALL RESPONSES TO PHQ9 QUESTIONS 1 & 2: 0
1. LITTLE INTEREST OR PLEASURE IN DOING THINGS: 0
SUM OF ALL RESPONSES TO PHQ QUESTIONS 1-9: 0
2. FEELING DOWN, DEPRESSED OR HOPELESS: 0

## 2023-12-11 ASSESSMENT — LIFESTYLE VARIABLES
HOW MANY STANDARD DRINKS CONTAINING ALCOHOL DO YOU HAVE ON A TYPICAL DAY: 1 OR 2
HOW OFTEN DO YOU HAVE A DRINK CONTAINING ALCOHOL: MONTHLY OR LESS

## 2023-12-11 NOTE — PROGRESS NOTES
Hammad Friedman is a 80 y.o. presents today for   Chief Complaint   Patient presents with    Medicare AWV    Diabetes     Is someone accompanying this pt? No     Is the patient using any DME equipment during OV? No      Health Maintenance Due   Topic Date Due    DTaP/Tdap/Td vaccine (1 - Tdap) Never done    Shingles vaccine (1 of 2) Never done    Hepatitis B vaccine (1 of 3 - Risk 3-dose series) Never done    Respiratory Syncytial Virus (RSV) age 61 yrs+ (3 - 1-dose 60+ series) Never done    Annual Wellness Visit (AWV)  07/20/2023    Flu vaccine (1) 08/01/2023    COVID-19 Vaccine (4 - 2023-24 season) 09/01/2023         Coordination of Care:   1. \"Have you been to the ER, urgent care clinic since your last visit? Hospitalized since your last visit? \" Yes, ED for weakness     2. \"Have you seen or consulted any other health care providers outside of the [address] [place] [address] since your last visit? \" No     3. For patients aged 43-73: Has the patient had a colonoscopy / FIT/ Cologuard? NA - based on age      If the patient is female:    4. For patients aged 43-66: Has the patient had a mammogram within the past 2 years? NA - based on age or sex      11. For patients aged 21-65: Has the patient had a pap smear?  NA - based on age or sex    Chanelle Alvarado

## 2023-12-12 LAB
25(OH)D3+25(OH)D2 SERPL-MCNC: 29.2 NG/ML (ref 30–100)
ALBUMIN SERPL-MCNC: 4 G/DL (ref 3.7–4.7)
ALBUMIN/GLOB SERPL: 1.4 {RATIO} (ref 1.2–2.2)
ALP SERPL-CCNC: 87 IU/L (ref 44–121)
ALT SERPL-CCNC: 13 IU/L (ref 0–32)
APPEARANCE UR: CLEAR
AST SERPL-CCNC: 18 IU/L (ref 0–40)
BASOPHILS # BLD AUTO: 0 X10E3/UL (ref 0–0.2)
BASOPHILS NFR BLD AUTO: 0 %
BILIRUB SERPL-MCNC: 0.5 MG/DL (ref 0–1.2)
BILIRUB UR QL STRIP: NEGATIVE
BUN SERPL-MCNC: 14 MG/DL (ref 8–27)
BUN/CREAT SERPL: 25 (ref 12–28)
CALCIUM SERPL-MCNC: 9.5 MG/DL (ref 8.7–10.3)
CHLORIDE SERPL-SCNC: 101 MMOL/L (ref 96–106)
CHOLEST SERPL-MCNC: 206 MG/DL (ref 100–199)
CO2 SERPL-SCNC: 26 MMOL/L (ref 20–29)
COLOR UR: YELLOW
CREAT SERPL-MCNC: 0.56 MG/DL (ref 0.57–1)
EGFRCR SERPLBLD CKD-EPI 2021: 88 ML/MIN/1.73
EOSINOPHIL # BLD AUTO: 0.1 X10E3/UL (ref 0–0.4)
EOSINOPHIL NFR BLD AUTO: 1 %
ERYTHROCYTE [DISTWIDTH] IN BLOOD BY AUTOMATED COUNT: 12.4 % (ref 11.7–15.4)
GLOBULIN SER CALC-MCNC: 2.9 G/DL (ref 1.5–4.5)
GLUCOSE SERPL-MCNC: 127 MG/DL (ref 70–99)
GLUCOSE UR QL STRIP: NEGATIVE
HBA1C MFR BLD: 7.1 % (ref 4.8–5.6)
HCT VFR BLD AUTO: 38.8 % (ref 34–46.6)
HDLC SERPL-MCNC: 82 MG/DL
HGB BLD-MCNC: 12.3 G/DL (ref 11.1–15.9)
HGB UR QL STRIP: NEGATIVE
IMM GRANULOCYTES # BLD AUTO: 0 X10E3/UL (ref 0–0.1)
IMM GRANULOCYTES NFR BLD AUTO: 0 %
KETONES UR QL STRIP: NEGATIVE
LDLC SERPL CALC-MCNC: 116 MG/DL (ref 0–99)
LEUKOCYTE ESTERASE UR QL STRIP: NEGATIVE
LYMPHOCYTES # BLD AUTO: 1.7 X10E3/UL (ref 0.7–3.1)
LYMPHOCYTES NFR BLD AUTO: 29 %
MCH RBC QN AUTO: 26.9 PG (ref 26.6–33)
MCHC RBC AUTO-ENTMCNC: 31.7 G/DL (ref 31.5–35.7)
MCV RBC AUTO: 85 FL (ref 79–97)
MICRO URNS: NORMAL
MONOCYTES # BLD AUTO: 0.5 X10E3/UL (ref 0.1–0.9)
MONOCYTES NFR BLD AUTO: 8 %
NEUTROPHILS # BLD AUTO: 3.7 X10E3/UL (ref 1.4–7)
NEUTROPHILS NFR BLD AUTO: 62 %
NITRITE UR QL STRIP: NEGATIVE
PH UR STRIP: 7 [PH] (ref 5–7.5)
PLATELET # BLD AUTO: 245 X10E3/UL (ref 150–450)
POTASSIUM SERPL-SCNC: 3.9 MMOL/L (ref 3.5–5.2)
PROT SERPL-MCNC: 6.9 G/DL (ref 6–8.5)
PROT UR QL STRIP: NEGATIVE
RBC # BLD AUTO: 4.57 X10E6/UL (ref 3.77–5.28)
SODIUM SERPL-SCNC: 140 MMOL/L (ref 134–144)
SP GR UR STRIP: 1.01 (ref 1–1.03)
SPECIMEN STATUS REPORT: NORMAL
TRIGL SERPL-MCNC: 46 MG/DL (ref 0–149)
TSH SERPL DL<=0.005 MIU/L-ACNC: 1.03 UIU/ML (ref 0.45–4.5)
UROBILINOGEN UR STRIP-MCNC: 0.2 MG/DL (ref 0.2–1)
VLDLC SERPL CALC-MCNC: 8 MG/DL (ref 5–40)
WBC # BLD AUTO: 6 X10E3/UL (ref 3.4–10.8)

## 2024-01-24 ENCOUNTER — TELEPHONE (OUTPATIENT)
Facility: CLINIC | Age: 89
End: 2024-01-24

## 2024-01-24 NOTE — TELEPHONE ENCOUNTER
----- Message from Radha Kim MA sent at 1/24/2024 12:04 PM EST -----  Subject: Message to Provider    QUESTIONS  Information for Provider? Pt called and stated that she would like to talk   to Dr. Jaramillo regarding if she should get another urine test   before her appointment in April. Pt states that her other Dr. stated that   the pt has Crystal in her urine and Pt would like to know if she needs to   do another urine test before April. Please call the pt back and inform.   ---------------------------------------------------------------------------  --------------  CALL BACK INFO  [phone]; OK to leave message on voicemail  ---------------------------------------------------------------------------  --------------  SCRIPT ANSWERS  Relationship to Patient? Self

## 2024-01-24 NOTE — TELEPHONE ENCOUNTER
Called pt, and discussed she had crystals in a urine prior, but not on a recent UA. She verbalized understanding.

## 2024-02-08 ENCOUNTER — TELEPHONE (OUTPATIENT)
Facility: CLINIC | Age: 89
End: 2024-02-08

## 2024-02-08 NOTE — TELEPHONE ENCOUNTER
Patient contacted the office to try to reschedule her ER visit on  due to her having to attend a . I made her aware of your next available which is in March and she is requesting a call back so she can speak with you. Please advise, thank you.    Call back: [phone]

## 2024-03-11 ENCOUNTER — OFFICE VISIT (OUTPATIENT)
Facility: CLINIC | Age: 89
End: 2024-03-11
Payer: MEDICARE

## 2024-03-11 VITALS
SYSTOLIC BLOOD PRESSURE: 126 MMHG | DIASTOLIC BLOOD PRESSURE: 72 MMHG | HEIGHT: 64 IN | TEMPERATURE: 97.5 F | WEIGHT: 182.2 LBS | HEART RATE: 77 BPM | BODY MASS INDEX: 31.1 KG/M2 | RESPIRATION RATE: 12 BRPM | OXYGEN SATURATION: 98 %

## 2024-03-11 DIAGNOSIS — E11.9 TYPE 2 DIABETES MELLITUS WITHOUT COMPLICATION, WITHOUT LONG-TERM CURRENT USE OF INSULIN (HCC): Primary | ICD-10-CM

## 2024-03-11 DIAGNOSIS — R15.1 FECAL SMEARING: ICD-10-CM

## 2024-03-11 DIAGNOSIS — J30.89 ALLERGIC RHINITIS DUE TO OTHER ALLERGIC TRIGGER, UNSPECIFIED SEASONALITY: ICD-10-CM

## 2024-03-11 LAB — HBA1C MFR BLD: 7.4 %

## 2024-03-11 PROCEDURE — 1090F PRES/ABSN URINE INCON ASSESS: CPT | Performed by: STUDENT IN AN ORGANIZED HEALTH CARE EDUCATION/TRAINING PROGRAM

## 2024-03-11 PROCEDURE — G8417 CALC BMI ABV UP PARAM F/U: HCPCS | Performed by: STUDENT IN AN ORGANIZED HEALTH CARE EDUCATION/TRAINING PROGRAM

## 2024-03-11 PROCEDURE — 83036 HEMOGLOBIN GLYCOSYLATED A1C: CPT | Performed by: STUDENT IN AN ORGANIZED HEALTH CARE EDUCATION/TRAINING PROGRAM

## 2024-03-11 PROCEDURE — 99214 OFFICE O/P EST MOD 30 MIN: CPT | Performed by: STUDENT IN AN ORGANIZED HEALTH CARE EDUCATION/TRAINING PROGRAM

## 2024-03-11 PROCEDURE — G8484 FLU IMMUNIZE NO ADMIN: HCPCS | Performed by: STUDENT IN AN ORGANIZED HEALTH CARE EDUCATION/TRAINING PROGRAM

## 2024-03-11 PROCEDURE — 1036F TOBACCO NON-USER: CPT | Performed by: STUDENT IN AN ORGANIZED HEALTH CARE EDUCATION/TRAINING PROGRAM

## 2024-03-11 PROCEDURE — 1123F ACP DISCUSS/DSCN MKR DOCD: CPT | Performed by: STUDENT IN AN ORGANIZED HEALTH CARE EDUCATION/TRAINING PROGRAM

## 2024-03-11 PROCEDURE — G8427 DOCREV CUR MEDS BY ELIG CLIN: HCPCS | Performed by: STUDENT IN AN ORGANIZED HEALTH CARE EDUCATION/TRAINING PROGRAM

## 2024-03-11 RX ORDER — CALCIUM CARBONATE 500(1250)
600 TABLET ORAL DAILY
COMMUNITY

## 2024-03-11 RX ORDER — MULTIVIT WITH MINERALS/LUTEIN
1000 TABLET ORAL DAILY
COMMUNITY

## 2024-03-11 RX ORDER — LANOLIN ALCOHOL/MO/W.PET/CERES
2500 CREAM (GRAM) TOPICAL DAILY
COMMUNITY

## 2024-03-11 RX ORDER — ZINC GLUCONATE 50 MG
50 TABLET ORAL DAILY
COMMUNITY

## 2024-03-11 ASSESSMENT — ENCOUNTER SYMPTOMS
VOMITING: 0
EYE DISCHARGE: 0
CONSTIPATION: 0
DIARRHEA: 0
EYE ITCHING: 0
FACIAL SWELLING: 0
EYE REDNESS: 0
SHORTNESS OF BREATH: 0
BACK PAIN: 0
EYE PAIN: 0
ABDOMINAL PAIN: 0
COLOR CHANGE: 0

## 2024-03-11 ASSESSMENT — PATIENT HEALTH QUESTIONNAIRE - PHQ9
SUM OF ALL RESPONSES TO PHQ QUESTIONS 1-9: 0
1. LITTLE INTEREST OR PLEASURE IN DOING THINGS: 0
SUM OF ALL RESPONSES TO PHQ QUESTIONS 1-9: 0
2. FEELING DOWN, DEPRESSED OR HOPELESS: 0
SUM OF ALL RESPONSES TO PHQ QUESTIONS 1-9: 0
SUM OF ALL RESPONSES TO PHQ9 QUESTIONS 1 & 2: 0
SUM OF ALL RESPONSES TO PHQ QUESTIONS 1-9: 0

## 2024-03-11 NOTE — PROGRESS NOTES
Charlene Mcgill is a 88 y.o. year old female who presents today for   Chief Complaint   Patient presents with    Follow-up       Is someone accompanying this pt? No     Is the patient using any DME equipment during OV? No     Depression Screening:       3/11/2024     2:37 PM 12/11/2023     1:58 PM 7/11/2023     3:11 PM 3/20/2023     3:23 PM 1/30/2023     3:59 PM 12/15/2022     8:09 AM 7/19/2022     8:31 AM   PHQ-9 Questionaire   Little interest or pleasure in doing things 0 0 0 0 0 0 0   Feeling down, depressed, or hopeless 0 0 0 0 0 0 0   PHQ-9 Total Score 0 0 0 0 0 0 0       Abuse Screening:       No data to display                Learning Assessment:  No question data found.    Fall Risk:      12/11/2023     1:58 PM 7/11/2023     3:12 PM 3/20/2023     3:23 PM   Fall Risk   2 or more falls in past year? no no no   Fall with injury in past year? no no no           Coordination of Care:   1. \"Have you been to the ER, urgent care clinic since your last visit?  Hospitalized since your last visit?\" Yes     2. \"Have you seen or consulted any other health care providers outside of the [facility] since your last visit?\" No     3. For patients aged 45-75: Has the patient had a colonoscopy / FIT/ Cologuard? Not due     If the patient is female:    4. For patients aged 40-74: Has the patient had a mammogram within the past 2 years? Not due     5. For patients aged 21-65: Has the patient had a pap smear? Not due     Health Maintenance: reviewed and discussed and ordered per Provider.    Health Maintenance Due   Topic Date Due    DTaP/Tdap/Td vaccine (1 - Tdap) Never done    Shingles vaccine (1 of 2) Never done    Respiratory Syncytial Virus (RSV) Pregnant or age 60 yrs+ (1 - 1-dose 60+ series) Never done    Flu vaccine (1) 08/01/2023    COVID-19 Vaccine (4 - 2023-24 season) 09/01/2023    Annual Wellness Visit (Medicare Advantage)  01/01/2024        -Alejandra Shah LPN  [facility] 
Gait normal.   Psychiatric:         Mood and Affect: Mood normal.         Behavior: Behavior normal.         Thought Content: Thought content normal.         Judgment: Judgment normal.           LABS     TESTS      Assessment/Plan:    1. Type 2 diabetes mellitus without complication, without long-term current use of insulin (HCC)  Slightly elevated compared to last time.  Patient states that she will try to adjust her lifestyle modifications to have a better diet, and exercise.  Will recheck in 3 months.  - AMB POC HEMOGLOBIN A1C    2. Allergic rhinitis due to other allergic trigger, unspecified seasonality  Continue with Flonase.    3. Fecal smearing  Patient declines any treatment at this time due to intermittent issue.    Lab review: no lab studies available for review at time of visit    On this date 3/11/2024 I have spent 30 minutes reviewing previous notes, test results and face to face with the patient discussing the diagnosis and importance of compliance with the treatment plan as well as documenting on the day of the visit.    I have discussed the diagnosis with the patient and the intended plan as seen in the above orders.  The patient has received an after-visit summary and questions were answered concerning future plans.  I have discussed medication side effects and warnings with the patient as well. I have reviewed the plan of care with the patient, accepted their input and they are in agreement with the treatment goals.     Chelsea Morillo MD

## 2024-03-12 ENCOUNTER — TELEPHONE (OUTPATIENT)
Facility: CLINIC | Age: 89
End: 2024-03-12

## 2024-03-12 NOTE — TELEPHONE ENCOUNTER
Patient is requesting a referral for a heart doctor.    She is also requesting a refill on the Vertigo medication.    Patient is requesting provider to please give her a call to discuss referral         Please advise    Thank you

## 2024-03-14 DIAGNOSIS — R07.89 OTHER CHEST PAIN: Primary | ICD-10-CM

## 2024-03-14 DIAGNOSIS — R42 VERTIGO: ICD-10-CM

## 2024-03-14 RX ORDER — MECLIZINE HCL 12.5 MG/1
12.5 TABLET ORAL 3 TIMES DAILY PRN
Qty: 30 TABLET | Refills: 0 | Status: SHIPPED | OUTPATIENT
Start: 2024-03-14 | End: 2024-03-24

## 2024-05-02 ENCOUNTER — OFFICE VISIT (OUTPATIENT)
Age: 89
End: 2024-05-02

## 2024-05-02 VITALS
SYSTOLIC BLOOD PRESSURE: 118 MMHG | BODY MASS INDEX: 30.77 KG/M2 | WEIGHT: 180.2 LBS | HEIGHT: 64 IN | HEART RATE: 70 BPM | TEMPERATURE: 97.8 F | RESPIRATION RATE: 16 BRPM | DIASTOLIC BLOOD PRESSURE: 62 MMHG | OXYGEN SATURATION: 97 %

## 2024-05-02 DIAGNOSIS — R01.1 SYSTOLIC MURMUR: ICD-10-CM

## 2024-05-02 DIAGNOSIS — R94.31 ABNORMAL ECG: ICD-10-CM

## 2024-05-02 DIAGNOSIS — E78.00 HYPERCHOLESTEREMIA: ICD-10-CM

## 2024-05-02 DIAGNOSIS — R55 SYNCOPE AND COLLAPSE: ICD-10-CM

## 2024-05-02 DIAGNOSIS — I50.9 CONGESTIVE HEART FAILURE, UNSPECIFIED HF CHRONICITY, UNSPECIFIED HEART FAILURE TYPE (HCC): Primary | ICD-10-CM

## 2024-05-02 DIAGNOSIS — R06.02 EXERTIONAL SHORTNESS OF BREATH: ICD-10-CM

## 2024-05-02 RX ORDER — MECLIZINE HYDROCHLORIDE 25 MG/1
25 TABLET ORAL EVERY 8 HOURS PRN
COMMUNITY
Start: 2016-09-23

## 2024-05-02 RX ORDER — THIAMINE MONONITRATE (VIT B1) 100 MG
100 TABLET ORAL DAILY
COMMUNITY

## 2024-05-02 ASSESSMENT — ENCOUNTER SYMPTOMS
GASTROINTESTINAL NEGATIVE: 1
SHORTNESS OF BREATH: 1
ALLERGIC/IMMUNOLOGIC NEGATIVE: 1
EYES NEGATIVE: 1

## 2024-05-02 ASSESSMENT — LIFESTYLE VARIABLES: HOW MANY STANDARD DRINKS CONTAINING ALCOHOL DO YOU HAVE ON A TYPICAL DAY: PATIENT DOES NOT DRINK

## 2024-05-02 NOTE — PROGRESS NOTES
Charlene Mcgill (:  1935) is a 88 y.o. female,New patient, here for evaluation of the following chief complaint(s):  New Patient    Subjective   SUBJECTIVE/OBJECTIVE:  HPI  Patient presents today for evaluation.  She is 88 years of age.  She has a history of heart failure diagnosed years ago.  She states she has maintained some concern about her heart although she has not had any follow-up to revisit her concerns.  She has limited symptoms.  No chest pain.  She will become dyspneic if she walks quickly but that is rare.  No other acute problems.  No palpitations.  She has had syncope in the past.  She has hyperlipidemia but is not on medication.  Lipid status currently is unknown.  No history of coronary disease or heart failure.  I was asked to evaluate her cardiac status and make recommendations.    I have carefully reviewed all available medical records, previous office notes, lab, x-ray and procedure reports    Past Medical History:   Diagnosis Date    CHF (congestive heart failure) ([facility])     COVID-19     Indigestion     Mixed incontinence     OAB (overactive bladder)     Osteopenia     Pure hypercholesterolemia     SOB (shortness of breath) on exertion     Syncope and collapse     Urinary incontinence         Past Surgical History:   Procedure Laterality Date    ANKLE FRACTURE SURGERY      TUBAL LIGATION      25 years ago        Allergies   Allergen Reactions    Mirabegron Other (See Comments)     Headache and dizziness         Current Outpatient Medications   Medication Sig Dispense Refill    meclizine (ANTIVERT) 25 MG tablet Take 1 tablet by mouth every 8 hours as needed for Dizziness or Nausea (vertigo)      vitamin B-1 (THIAMINE) 100 MG tablet Take 1 tablet by mouth daily      Ascorbic Acid (VITAMIN C) 250 MG tablet Take 4 tablets by mouth daily      calcium carbonate (OSCAL) 500 MG TABS tablet Take 600 mg by mouth daily      Apoaequorin (PREVAGEN PO) Take by mouth      vitamin B-12 (CYANOCOBALAMIN)

## 2024-05-02 NOTE — PROGRESS NOTES
1. \"Have you been to the ER, urgent care clinic since your last visit?  Hospitalized since your last visit?\" Reviewed by Dr. Naldo Rutledge    2. \"Have you seen or consulted any other health care providers outside of the [facility] since your last visit?\" Reviewed by Dr. Naldo Rutledge

## 2024-06-11 ENCOUNTER — HOSPITAL ENCOUNTER (OUTPATIENT)
Facility: HOSPITAL | Age: 89
Setting detail: SPECIMEN
Discharge: HOME | End: 2024-06-14
Payer: COMMERCIAL

## 2024-06-11 DIAGNOSIS — E11.9 TYPE 2 DIABETES MELLITUS WITHOUT COMPLICATION, WITHOUT LONG-TERM CURRENT USE OF INSULIN (HCC): Primary | ICD-10-CM

## 2024-06-11 DIAGNOSIS — E11.9 TYPE 2 DIABETES MELLITUS WITHOUT COMPLICATION, WITHOUT LONG-TERM CURRENT USE OF INSULIN (HCC): ICD-10-CM

## 2024-06-11 LAB
ANION GAP SERPL CALC-SCNC: 6 MMOL/L (ref 3–18)
BUN SERPL-MCNC: 15 MG/DL (ref 7–18)
BUN/CREAT SERPL: 22 (ref 12–20)
CALCIUM SERPL-MCNC: 9.5 MG/DL (ref 8.5–10.1)
CHLORIDE SERPL-SCNC: 104 MMOL/L (ref 100–111)
CO2 SERPL-SCNC: 29 MMOL/L (ref 21–32)
CREAT SERPL-MCNC: 0.67 MG/DL (ref 0.6–1.3)
CREAT UR-MCNC: 101 MG/DL (ref 30–125)
GLUCOSE SERPL-MCNC: 131 MG/DL (ref 74–99)
HBA1C MFR BLD: 6.7 % (ref 4.2–5.6)
MICROALBUMIN UR-MCNC: 0.87 MG/DL (ref 0–3)
MICROALBUMIN/CREAT UR-RTO: 9 MG/G (ref 0–30)
POTASSIUM SERPL-SCNC: 3.9 MMOL/L (ref 3.5–5.5)
SODIUM SERPL-SCNC: 139 MMOL/L (ref 136–145)

## 2024-06-11 PROCEDURE — 83036 HEMOGLOBIN GLYCOSYLATED A1C: CPT

## 2024-06-11 PROCEDURE — 82043 UR ALBUMIN QUANTITATIVE: CPT

## 2024-06-11 PROCEDURE — 36415 COLL VENOUS BLD VENIPUNCTURE: CPT

## 2024-06-11 PROCEDURE — 82570 ASSAY OF URINE CREATININE: CPT

## 2024-06-11 PROCEDURE — 80048 BASIC METABOLIC PNL TOTAL CA: CPT

## 2024-06-17 ENCOUNTER — OFFICE VISIT (OUTPATIENT)
Facility: CLINIC | Age: 89
End: 2024-06-17
Payer: COMMERCIAL

## 2024-06-17 VITALS
OXYGEN SATURATION: 96 % | BODY MASS INDEX: 30.93 KG/M2 | TEMPERATURE: 98.1 F | HEART RATE: 68 BPM | DIASTOLIC BLOOD PRESSURE: 69 MMHG | HEIGHT: 64 IN | WEIGHT: 181.2 LBS | RESPIRATION RATE: 14 BRPM | SYSTOLIC BLOOD PRESSURE: 103 MMHG

## 2024-06-17 DIAGNOSIS — R42 VERTIGO: ICD-10-CM

## 2024-06-17 DIAGNOSIS — E11.9 TYPE 2 DIABETES MELLITUS WITHOUT COMPLICATION, WITHOUT LONG-TERM CURRENT USE OF INSULIN (HCC): Primary | ICD-10-CM

## 2024-06-17 PROCEDURE — 99214 OFFICE O/P EST MOD 30 MIN: CPT | Performed by: STUDENT IN AN ORGANIZED HEALTH CARE EDUCATION/TRAINING PROGRAM

## 2024-06-17 PROCEDURE — 3044F HG A1C LEVEL LT 7.0%: CPT | Performed by: STUDENT IN AN ORGANIZED HEALTH CARE EDUCATION/TRAINING PROGRAM

## 2024-06-17 PROCEDURE — 1123F ACP DISCUSS/DSCN MKR DOCD: CPT | Performed by: STUDENT IN AN ORGANIZED HEALTH CARE EDUCATION/TRAINING PROGRAM

## 2024-06-17 RX ORDER — MECLIZINE HCL 12.5 MG/1
12.5 TABLET ORAL 3 TIMES DAILY PRN
Qty: 30 TABLET | Refills: 0 | Status: SHIPPED | OUTPATIENT
Start: 2024-06-17 | End: 2024-06-27

## 2024-06-17 ASSESSMENT — ENCOUNTER SYMPTOMS
COLOR CHANGE: 0
EYE PAIN: 0
ABDOMINAL PAIN: 0
CONSTIPATION: 0
EYE ITCHING: 0
EYE REDNESS: 0
FACIAL SWELLING: 0
DIARRHEA: 0
VOMITING: 0
SHORTNESS OF BREATH: 0
EYE DISCHARGE: 0
BACK PAIN: 0

## 2024-06-17 NOTE — PROGRESS NOTES
Charlene Mcgill is a 88 y.o. year old female who presents today for   Chief Complaint   Patient presents with    Discuss Labs       Is someone accompanying this pt? No    Is the patient using any DME equipment during OV? No    Depression Screening:       3/11/2024     2:37 PM 12/11/2023     1:58 PM 7/11/2023     3:11 PM 3/20/2023     3:23 PM 1/30/2023     3:59 PM 12/15/2022     8:09 AM 7/19/2022     8:31 AM   PHQ-9 Questionaire   Little interest or pleasure in doing things 0 0 0 0 0 0 0   Feeling down, depressed, or hopeless 0 0 0 0 0 0 0   PHQ-9 Total Score 0 0 0 0 0 0 0       Abuse Screening:       No data to display                Learning Assessment:  No question data found.    Fall Risk:      12/11/2023     1:58 PM 7/11/2023     3:12 PM 3/20/2023     3:23 PM   Fall Risk   2 or more falls in past year? no no no   Fall with injury in past year? no no no           Coordination of Care:   1. \"Have you been to the ER, urgent care clinic since your last visit?  Hospitalized since your last visit?\" No    2. \"Have you seen or consulted any other health care providers outside of the [facility] since your last visit?\" Yes    3. For patients aged 45-75: Has the patient had a colonoscopy / FIT/ Cologuard? N/A    If the patient is female:    4. For patients aged 40-74: Has the patient had a mammogram within the past 2 years? N/A    5. For patients aged 21-65: Has the patient had a pap smear? N/A    Health Maintenance: reviewed and discussed and ordered per Provider.    Health Maintenance Due   Topic Date Due    DTaP/Tdap/Td vaccine (1 - Tdap) Never done    Shingles vaccine (1 of 2) Never done    Respiratory Syncytial Virus (RSV) Pregnant or age 60 yrs+ (1 - 1-dose 60+ series) Never done    COVID-19 Vaccine (4 - 2023-24 season) 09/01/2023        - Alessandra Kumar CMA  [facility]  Phone: [phone]  Fax: [phone]

## 2024-06-17 NOTE — PROGRESS NOTES
Charlene Mcgill is a 88 y.o.  female and presents with    Chief Complaint   Patient presents with    Discuss Labs           Subjective:      Diabetes  Taking medications as prescribed: NO  Currently on:  diet controlled  Checking blood sugars at home at home: YES  Symptoms: none  Diabetic diet: YES  Exercise: YES    Would like to have a refill on the vertigo medication.  She states this help her when she has vertigo, and only uses this PRN. Has 4 pills left at this time.     Patient Active Problem List   Diagnosis    Advance care planning    Pure hypercholesterolemia    Urinary, incontinence, stress female    Osteopenia, senile    Type 2 diabetes mellitus ([facility])      Past Medical History:   Diagnosis Date    CHF (congestive heart failure) ([facility])     COVID-19     Indigestion     Mixed incontinence     OAB (overactive bladder)     Osteopenia     Pure hypercholesterolemia     SOB (shortness of breath) on exertion     Syncope and collapse     Urinary incontinence       Past Surgical History:   Procedure Laterality Date    ANKLE FRACTURE SURGERY      TUBAL LIGATION      25 years ago      Family History   Problem Relation Age of Onset    Hypertension Father      Social History     Socioeconomic History    Marital status: Single     Spouse name: Not on file    Number of children: Not on file    Years of education: Not on file    Highest education level: Not on file   Occupational History    Not on file   Tobacco Use    Smoking status: Never    Smokeless tobacco: Never   Vaping Use    Vaping Use: Never used   Substance and Sexual Activity    Alcohol use: Yes     Alcohol/week: 1.0 standard drink of alcohol    Drug use: No    Sexual activity: Defer   Other Topics Concern    Not on file   Social History Narrative    Not on file     Social Determinants of Health     Financial Resource Strain: Patient Declined (7/11/2023)    Overall Financial Resource Strain (CARDIA)     Difficulty of Paying Living Expenses: Patient

## 2024-07-17 ENCOUNTER — TELEPHONE (OUTPATIENT)
Facility: CLINIC | Age: 89
End: 2024-07-17

## 2024-08-23 ENCOUNTER — TELEPHONE (OUTPATIENT)
Facility: CLINIC | Age: 89
End: 2024-08-23

## 2024-08-23 NOTE — TELEPHONE ENCOUNTER
Pt called the office requesting referrals for a Neuro and Urology specialist.    Please advise.    Next Appt: 11/18/24

## 2024-08-27 DIAGNOSIS — R35.0 URINARY FREQUENCY: Primary | ICD-10-CM

## 2024-10-15 ENCOUNTER — TELEPHONE (OUTPATIENT)
Facility: CLINIC | Age: 88
End: 2024-10-15

## 2024-10-15 NOTE — TELEPHONE ENCOUNTER
Pt called requesting a return call from Dr. Tran to discuss stomach issues and having diarrhea.      Pt has been notified that they may be billed for the provider's call.     Please call. Thank you.

## 2024-11-18 ENCOUNTER — OFFICE VISIT (OUTPATIENT)
Facility: CLINIC | Age: 89
End: 2024-11-18
Payer: MEDICARE

## 2024-11-18 VITALS
HEART RATE: 66 BPM | WEIGHT: 177.4 LBS | BODY MASS INDEX: 30.29 KG/M2 | SYSTOLIC BLOOD PRESSURE: 126 MMHG | TEMPERATURE: 97.9 F | RESPIRATION RATE: 13 BRPM | DIASTOLIC BLOOD PRESSURE: 73 MMHG | OXYGEN SATURATION: 99 % | HEIGHT: 64 IN

## 2024-11-18 DIAGNOSIS — Z28.21 REFUSED INFLUENZA VACCINE: ICD-10-CM

## 2024-11-18 DIAGNOSIS — M54.50 CHRONIC BILATERAL LOW BACK PAIN WITHOUT SCIATICA: ICD-10-CM

## 2024-11-18 DIAGNOSIS — G89.29 CHRONIC BILATERAL LOW BACK PAIN WITHOUT SCIATICA: ICD-10-CM

## 2024-11-18 DIAGNOSIS — E11.9 TYPE 2 DIABETES MELLITUS WITHOUT COMPLICATION, WITHOUT LONG-TERM CURRENT USE OF INSULIN (HCC): Primary | ICD-10-CM

## 2024-11-18 LAB — HBA1C MFR BLD: 7.4 %

## 2024-11-18 PROCEDURE — 1123F ACP DISCUSS/DSCN MKR DOCD: CPT | Performed by: STUDENT IN AN ORGANIZED HEALTH CARE EDUCATION/TRAINING PROGRAM

## 2024-11-18 PROCEDURE — 99214 OFFICE O/P EST MOD 30 MIN: CPT | Performed by: STUDENT IN AN ORGANIZED HEALTH CARE EDUCATION/TRAINING PROGRAM

## 2024-11-18 PROCEDURE — 3044F HG A1C LEVEL LT 7.0%: CPT | Performed by: STUDENT IN AN ORGANIZED HEALTH CARE EDUCATION/TRAINING PROGRAM

## 2024-11-18 PROCEDURE — 83036 HEMOGLOBIN GLYCOSYLATED A1C: CPT | Performed by: STUDENT IN AN ORGANIZED HEALTH CARE EDUCATION/TRAINING PROGRAM

## 2024-11-18 SDOH — ECONOMIC STABILITY: FOOD INSECURITY: WITHIN THE PAST 12 MONTHS, YOU WORRIED THAT YOUR FOOD WOULD RUN OUT BEFORE YOU GOT MONEY TO BUY MORE.: NEVER TRUE

## 2024-11-18 SDOH — ECONOMIC STABILITY: INCOME INSECURITY: HOW HARD IS IT FOR YOU TO PAY FOR THE VERY BASICS LIKE FOOD, HOUSING, MEDICAL CARE, AND HEATING?: NOT HARD AT ALL

## 2024-11-18 SDOH — ECONOMIC STABILITY: FOOD INSECURITY: WITHIN THE PAST 12 MONTHS, THE FOOD YOU BOUGHT JUST DIDN'T LAST AND YOU DIDN'T HAVE MONEY TO GET MORE.: NEVER TRUE

## 2024-11-18 ASSESSMENT — ENCOUNTER SYMPTOMS
CONSTIPATION: 0
EYE PAIN: 0
FACIAL SWELLING: 0
BACK PAIN: 0
COLOR CHANGE: 0
ABDOMINAL PAIN: 0
EYE REDNESS: 0
VOMITING: 0
SHORTNESS OF BREATH: 0
EYE ITCHING: 0
DIARRHEA: 0
EYE DISCHARGE: 0

## 2024-11-18 NOTE — PROGRESS NOTES
Charlene Mcgill is a 89 y.o.  female and presents with    Chief Complaint   Patient presents with    Diabetes           Subjective:    Diabetes  Taking medications as prescribed: NO  Currently on:  diet controlled  Checking blood sugars at home at home: YES  Symptoms: none  Diabetic diet: YES  Exercise: YES    She was seen by Dr. Hernandez - orthopedic surgeon. She was told that she does not need to have surgery on her back, but will be going to PT starting next week.       Patient Active Problem List   Diagnosis    Advance care planning    Pure hypercholesterolemia    Urinary, incontinence, stress female    Osteopenia, senile    Type 2 diabetes mellitus ([facility])      Past Medical History:   Diagnosis Date    CHF (congestive heart failure) ([facility])     COVID-19     Indigestion     Mixed incontinence     OAB (overactive bladder)     Osteopenia     Pure hypercholesterolemia     SOB (shortness of breath) on exertion     Syncope and collapse     Urinary incontinence       Past Surgical History:   Procedure Laterality Date    ANKLE FRACTURE SURGERY      TUBAL LIGATION      25 years ago      Family History   Problem Relation Age of Onset    Hypertension Father      Social History     Socioeconomic History    Marital status:      Spouse name: Not on file    Number of children: Not on file    Years of education: Not on file    Highest education level: Not on file   Occupational History    Not on file   Tobacco Use    Smoking status: Never    Smokeless tobacco: Never   Vaping Use    Vaping status: Never Used   Substance and Sexual Activity    Alcohol use: Yes     Alcohol/week: 1.0 standard drink of alcohol    Drug use: No    Sexual activity: Defer   Other Topics Concern    Not on file   Social History Narrative    Not on file     Social Determinants of Health     Financial Resource Strain: Low Risk  (11/18/2024)    Overall Financial Resource Strain (CARDIA)     Difficulty of Paying Living Expenses: Not hard at

## 2024-11-18 NOTE — PROGRESS NOTES
Charlene Mcgill is a 89 y.o. year old female who presents today for   Chief Complaint   Patient presents with    Diabetes        \"Have you been to the ER, urgent care clinic since your last visit?  Hospitalized since your last visit?\"   YES - When: approximately 1 months ago.  Where and Why: [facility], back pain.     “Have you seen or consulted any other health care providers outside our system since your last visit?”   YES - When: approximately 1 months ago.  Where and Why: Urology.             -FINN Johnson [facility]  Phone: [phone]  Fax: [phone]

## 2024-11-22 ENCOUNTER — TELEPHONE (OUTPATIENT)
Facility: CLINIC | Age: 88
End: 2024-11-22

## 2024-12-03 DIAGNOSIS — M54.50 CHRONIC BILATERAL LOW BACK PAIN WITHOUT SCIATICA: Primary | ICD-10-CM

## 2024-12-03 DIAGNOSIS — G89.29 CHRONIC BILATERAL LOW BACK PAIN WITHOUT SCIATICA: Primary | ICD-10-CM

## 2024-12-03 RX ORDER — LIDOCAINE 50 MG/G
1 PATCH TOPICAL DAILY
Qty: 30 PATCH | Refills: 4 | Status: SHIPPED | OUTPATIENT
Start: 2024-12-03 | End: 2025-05-02

## 2024-12-19 NOTE — TELEPHONE ENCOUNTER
Lvm to return call. Please schedule a VV for patient. PRE-SEDATION ASSESSMENT     CONSENT  Consent for procedure and sedation obtained: Yes     PHYSICAL EXAM  Airway Anatomy : Class II  MALLAMPATI CLASSIFICATION  Class I: Soft palate, uvula, fauces, pillars visible   Class II: Soft palate, uvula, fauces visible   Class III: Soft palate, bases on uvula visible   Class IV: Only hard palate visible      Limited neck movement: None   Jaw Problems/deformities/TMJ: None   Cervical spine disease: None   Neck mass: None   Stridor: None     OTHER FINDINGS  Pertinent history, assessment, medication, allergies, and pre procedure documentation reviewed.      MODERATE SEDATION  Benzodiazepine: Midazolam   Opioid: Fentanyl     SEDATION RISK ASSESSMENT  Risk Status ASA: ASA II  A patient with mild systemic disease     Past anesthetic/sedation complications/reactions: No     Patient suitable to undergo sedation and the risks, benefits, and alternatives have been discussed with patient/decision maker     Patient has contrast allergy. Will premedicate.

## 2025-01-06 ENCOUNTER — TELEPHONE (OUTPATIENT)
Facility: CLINIC | Age: 89
End: 2025-01-06

## 2025-01-06 NOTE — TELEPHONE ENCOUNTER
Pt called in wanting to confirm if she can take Glucose 6 blood level tablets 525 mg, she ordered it online and wants to make sure its safe    Phone # [phone]

## 2025-03-01 LAB
BILIRUBIN, URINE: NEGATIVE
CLARITY, UA: CLEAR
COLOR, UA: YELLOW
GLUCOSE URINE: NEGATIVE MG/DL
HCT VFR BLD CALC: 40.1 % (ref 35.1–48.3)
HEMOGLOBIN: 12.4 G/DL (ref 11.7–16.1)
KETONES, URINE: NEGATIVE MG/DL
LEUKOCYTE ESTERASE, URINE: NEGATIVE
MCH RBC QN AUTO: 27 PG (ref 26–34)
MCHC RBC AUTO-ENTMCNC: 31 G/DL (ref 31–36)
MCV RBC AUTO: 86 FL (ref 80–99)
NITRITE, URINE: NEGATIVE
OCCULT BLOOD,URINE: NEGATIVE
PDW BLD-RTO: 13.2 % (ref 10–15.5)
PH, URINE: 7 PH (ref 5–8)
PLATELET # BLD: 211 K/UL (ref 140–440)
PMV BLD AUTO: 10.9 FL (ref 9–13)
PROTEIN, URINE: NEGATIVE MG/DL
RBC # BLD: 4.65 M/UL (ref 3.8–5.2)
SPECIFIC GRAVITY UA: 1.01 (ref 1–1.03)
UROBILINOGEN, URINE: 0.2 MG/DL
WBC # BLD: 6 K/UL (ref 4–11)

## 2025-03-02 LAB
A/G RATIO: 1.4 RATIO (ref 1.1–2.6)
ALBUMIN: 4 G/DL (ref 3.5–5)
ALP BLD-CCNC: 87 U/L (ref 40–120)
ALT SERPL-CCNC: 10 U/L (ref 5–40)
ANION GAP SERPL CALCULATED.3IONS-SCNC: 9 MMOL/L (ref 3–15)
AST SERPL-CCNC: 16 U/L (ref 10–37)
BILIRUB SERPL-MCNC: 0.6 MG/DL (ref 0.2–1.2)
BUN BLDV-MCNC: 11 MG/DL (ref 6–22)
CALCIUM SERPL-MCNC: 9.7 MG/DL (ref 8.4–10.5)
CHLORIDE BLD-SCNC: 101 MMOL/L (ref 98–110)
CHOLESTEROL, TOTAL: 199 MG/DL (ref 110–200)
CHOLESTEROL/HDL RATIO: 2.3 (ref 0–5)
CO2: 30 MMOL/L (ref 20–32)
CREAT SERPL-MCNC: 0.6 MG/DL (ref 0.8–1.4)
ESTIMATED AVERAGE GLUCOSE: 160 MG/DL (ref 91–123)
GFR, ESTIMATED: >60 ML/MIN/1.73 SQ.M.
GLOBULIN: 2.8 G/DL (ref 2–4)
GLUCOSE: 139 MG/DL (ref 70–99)
HBA1C MFR BLD: 7.2 % (ref 4.8–5.6)
HDLC SERPL-MCNC: 88 MG/DL
LDL CHOLESTEROL: 104 MG/DL (ref 50–99)
LDL/HDL RATIO: 1.2
NON-HDL CHOLESTEROL: 111 MG/DL
POTASSIUM SERPL-SCNC: 4 MMOL/L (ref 3.5–5.5)
SODIUM BLD-SCNC: 140 MMOL/L (ref 133–145)
TOTAL PROTEIN: 6.8 G/DL (ref 6.2–8.1)
TRIGL SERPL-MCNC: 32 MG/DL (ref 40–149)
TSH SERPL DL<=0.05 MIU/L-ACNC: 1.95 MCU/ML (ref 0.27–4.2)
VLDLC SERPL CALC-MCNC: 6 MG/DL (ref 8–30)

## 2025-03-21 ENCOUNTER — OFFICE VISIT (OUTPATIENT)
Facility: CLINIC | Age: 89
End: 2025-03-21

## 2025-03-21 VITALS
BODY MASS INDEX: 29.74 KG/M2 | WEIGHT: 174.2 LBS | SYSTOLIC BLOOD PRESSURE: 102 MMHG | RESPIRATION RATE: 14 BRPM | DIASTOLIC BLOOD PRESSURE: 67 MMHG | HEIGHT: 64 IN | OXYGEN SATURATION: 96 % | HEART RATE: 70 BPM | TEMPERATURE: 97.7 F

## 2025-03-21 DIAGNOSIS — Z00.00 MEDICARE ANNUAL WELLNESS VISIT, SUBSEQUENT: Primary | ICD-10-CM

## 2025-03-21 DIAGNOSIS — R42 VERTIGO: ICD-10-CM

## 2025-03-21 DIAGNOSIS — E11.9 TYPE 2 DIABETES MELLITUS WITHOUT COMPLICATION, WITHOUT LONG-TERM CURRENT USE OF INSULIN: ICD-10-CM

## 2025-03-21 RX ORDER — MECLIZINE HYDROCHLORIDE 25 MG/1
25 TABLET ORAL 3 TIMES DAILY PRN
Qty: 15 TABLET | Refills: 0 | Status: SHIPPED | OUTPATIENT
Start: 2025-03-21 | End: 2025-03-31

## 2025-03-21 SDOH — ECONOMIC STABILITY: FOOD INSECURITY: WITHIN THE PAST 12 MONTHS, THE FOOD YOU BOUGHT JUST DIDN'T LAST AND YOU DIDN'T HAVE MONEY TO GET MORE.: NEVER TRUE

## 2025-03-21 SDOH — ECONOMIC STABILITY: FOOD INSECURITY: WITHIN THE PAST 12 MONTHS, YOU WORRIED THAT YOUR FOOD WOULD RUN OUT BEFORE YOU GOT MONEY TO BUY MORE.: NEVER TRUE

## 2025-03-21 ASSESSMENT — PATIENT HEALTH QUESTIONNAIRE - PHQ9
SUM OF ALL RESPONSES TO PHQ QUESTIONS 1-9: 0
SUM OF ALL RESPONSES TO PHQ QUESTIONS 1-9: 0
1. LITTLE INTEREST OR PLEASURE IN DOING THINGS: NOT AT ALL
SUM OF ALL RESPONSES TO PHQ QUESTIONS 1-9: 0
SUM OF ALL RESPONSES TO PHQ QUESTIONS 1-9: 0
2. FEELING DOWN, DEPRESSED OR HOPELESS: NOT AT ALL

## 2025-03-21 ASSESSMENT — ENCOUNTER SYMPTOMS
EYE ITCHING: 0
EYE DISCHARGE: 0
EYE REDNESS: 0
COLOR CHANGE: 0
BACK PAIN: 0
ABDOMINAL PAIN: 0
DIARRHEA: 0
VOMITING: 0
CONSTIPATION: 0
SHORTNESS OF BREATH: 0
EYE PAIN: 0
FACIAL SWELLING: 0

## 2025-03-21 ASSESSMENT — LIFESTYLE VARIABLES
HOW MANY STANDARD DRINKS CONTAINING ALCOHOL DO YOU HAVE ON A TYPICAL DAY: PATIENT DOES NOT DRINK
HOW OFTEN DO YOU HAVE A DRINK CONTAINING ALCOHOL: NEVER

## 2025-03-21 NOTE — PATIENT INSTRUCTIONS
pressure, or a strange feeling in the chest.     Sweating.     Shortness of breath.     Pain, pressure, or a strange feeling in the back, neck, jaw, or upper belly or in one or both shoulders or arms.     Lightheadedness or sudden weakness.     A fast or irregular heartbeat.   After you call 911, the  may tell you to chew 1 adult-strength or 2 to 4 low-dose aspirin. Wait for an ambulance. Do not try to drive yourself.  Watch closely for changes in your health, and be sure to contact your doctor if you have any problems.  Where can you learn more?  Go to https://www.[employer].net/patientEd and enter F075 to learn more about \"A Healthy Heart: Care Instructions.\"  Current as of: July 31, 2024  Content Version: 14.4  © 3519-4119 [employer].   Care instructions adapted under license by [employer]. If you have questions about a medical condition or this instruction, always ask your healthcare professional. [employer], disclaims any warranty or liability for your use of this information.    Personalized Preventive Plan for Charlene Mcgill - 3/21/2025  Medicare offers a range of preventive health benefits. Some of the tests and screenings are paid in full while other may be subject to a deductible, co-insurance, and/or copay.  Some of these benefits include a comprehensive review of your medical history including lifestyle, illnesses that may run in your family, and various assessments and screenings as appropriate.  After reviewing your medical record and screening and assessments performed today your provider may have ordered immunizations, labs, imaging, and/or referrals for you.  A list of these orders (if applicable) as well as your Preventive Care list are included within your After Visit Summary for your review.

## 2025-03-21 NOTE — PROGRESS NOTES
Charlene Mcgill is a 89 y.o. year old female who presents today for   Chief Complaint   Patient presents with    Medicare AWV    4 Month Follow- Up        \"Have you been to the ER, urgent care clinic since your last visit?  Hospitalized since your last visit?\"   NO     “Have you seen or consulted any other health care providers outside our system since your last visit?”   NO             - MONICA Ramirez  [facility]  Phone: [phone]  Fax: [phone]  
        Mood and Affect: Mood normal.         Behavior: Behavior normal.         Thought Content: Thought content normal.         Judgment: Judgment normal.           LABS     TESTS      Assessment/Plan:    1. Medicare annual wellness visit, subsequent  See below    2. Type 2 diabetes mellitus without complication, without long-term current use of insulin ([facility])  improved    3. Vertigo  - meclizine (ANTIVERT) 25 MG tablet; Take 1 tablet by mouth 3 times daily as needed for Dizziness  Dispense: 15 tablet; Refill: 0    Lab review: no lab studies available for review at time of visit    On this date 3/21/2025 I have spent 30 minutes reviewing previous notes, test results and face to face with the patient discussing the diagnosis and importance of compliance with the treatment plan as well as documenting on the day of the visit.    I have discussed the diagnosis with the patient and the intended plan as seen in the above orders.  The patient has received an after-visit summary and questions were answered concerning future plans.  I have discussed medication side effects and warnings with the patient as well. I have reviewed the plan of care with the patient, accepted their input and they are in agreement with the treatment goals.     Chelsea Morillo MD     -----------------------------------------------------------      Medicare Annual Wellness Visit    Charlene Mcgill is here for Medicare AWV and 4 Month Follow- Up    Assessment & Plan   Medicare annual wellness visit, subsequent     No follow-ups on file.     Subjective       Patient's complete Health Risk Assessment and screening values have been reviewed and are found in Flowsheets. The following problems were reviewed today and where indicated follow up appointments were made and/or referrals ordered.    Positive Risk Factor Screenings with Interventions:                   Vision Screen:  Visual Acuity screen is abnormal due to a score of 20/25 or

## 2025-05-23 ENCOUNTER — OFFICE VISIT (OUTPATIENT)
Facility: CLINIC | Age: 89
End: 2025-05-23
Payer: MEDICARE

## 2025-05-23 VITALS
DIASTOLIC BLOOD PRESSURE: 62 MMHG | HEIGHT: 64 IN | RESPIRATION RATE: 13 BRPM | WEIGHT: 171 LBS | HEART RATE: 74 BPM | BODY MASS INDEX: 29.19 KG/M2 | OXYGEN SATURATION: 98 % | SYSTOLIC BLOOD PRESSURE: 105 MMHG | TEMPERATURE: 97.2 F

## 2025-05-23 DIAGNOSIS — I50.9 CONGESTIVE HEART FAILURE, UNSPECIFIED HF CHRONICITY, UNSPECIFIED HEART FAILURE TYPE (HCC): Primary | ICD-10-CM

## 2025-05-23 DIAGNOSIS — G89.29 CHRONIC BILATERAL LOW BACK PAIN WITHOUT SCIATICA: ICD-10-CM

## 2025-05-23 DIAGNOSIS — M54.50 CHRONIC BILATERAL LOW BACK PAIN WITHOUT SCIATICA: ICD-10-CM

## 2025-05-23 DIAGNOSIS — E11.9 TYPE 2 DIABETES MELLITUS WITHOUT COMPLICATION, WITHOUT LONG-TERM CURRENT USE OF INSULIN (HCC): ICD-10-CM

## 2025-05-23 PROCEDURE — 3051F HG A1C>EQUAL 7.0%<8.0%: CPT | Performed by: STUDENT IN AN ORGANIZED HEALTH CARE EDUCATION/TRAINING PROGRAM

## 2025-05-23 PROCEDURE — 1123F ACP DISCUSS/DSCN MKR DOCD: CPT | Performed by: STUDENT IN AN ORGANIZED HEALTH CARE EDUCATION/TRAINING PROGRAM

## 2025-05-23 PROCEDURE — 99214 OFFICE O/P EST MOD 30 MIN: CPT | Performed by: STUDENT IN AN ORGANIZED HEALTH CARE EDUCATION/TRAINING PROGRAM

## 2025-05-23 SDOH — ECONOMIC STABILITY: FOOD INSECURITY: WITHIN THE PAST 12 MONTHS, THE FOOD YOU BOUGHT JUST DIDN'T LAST AND YOU DIDN'T HAVE MONEY TO GET MORE.: NEVER TRUE

## 2025-05-23 SDOH — ECONOMIC STABILITY: FOOD INSECURITY: WITHIN THE PAST 12 MONTHS, YOU WORRIED THAT YOUR FOOD WOULD RUN OUT BEFORE YOU GOT MONEY TO BUY MORE.: NEVER TRUE

## 2025-05-23 ASSESSMENT — PATIENT HEALTH QUESTIONNAIRE - PHQ9
2. FEELING DOWN, DEPRESSED OR HOPELESS: NOT AT ALL
SUM OF ALL RESPONSES TO PHQ QUESTIONS 1-9: 0
1. LITTLE INTEREST OR PLEASURE IN DOING THINGS: NOT AT ALL
SUM OF ALL RESPONSES TO PHQ QUESTIONS 1-9: 0

## 2025-05-23 NOTE — PROGRESS NOTES
Charlene Mcgill is a 89 y.o.  female and presents with    Chief Complaint   Patient presents with    1 Month Follow-Up           Subjective:    Waiting to see dr. Hernandez her orthopedist. She was having issues w/ insurance to be able to go to him. She has been taking a ointment from Dr. Decker that has helped with her arthritis and her back pain.  Has appt next week. Had her PT done in [facility]. Continues to f/up w/ her chiropractor.     Diabetes  Taking medications as prescribed: NO  Currently on:  Diet  Checking blood sugars at home at home: YES  Symptoms: none  Diabetic diet: YES  Exercise: YES    She is here today to go over her heart rate. She denies palpitations, and denies SOB, or cough. She saw cardiology last year. ECHO was ordered at that time.  She has a hx of heart failure dx. She was given RTC date of PRN is sx appear, since she chooses not to do much medical management at this time.     Patient Active Problem List   Diagnosis    Advance care planning    Pure hypercholesterolemia    Urinary, incontinence, stress female    Osteopenia, senile    Type 2 diabetes mellitus ([facility])      Past Medical History:   Diagnosis Date    CHF (congestive heart failure) ([facility])     COVID-19     Indigestion     Mixed incontinence     OAB (overactive bladder)     Osteopenia     Pure hypercholesterolemia     SOB (shortness of breath) on exertion     Syncope and collapse     Urinary incontinence       Past Surgical History:   Procedure Laterality Date    ANKLE FRACTURE SURGERY      TUBAL LIGATION      25 years ago      Family History   Problem Relation Age of Onset    Hypertension Father      Social History     Socioeconomic History    Marital status:      Spouse name: Not on file    Number of children: Not on file    Years of education: Not on file    Highest education level: Not on file   Occupational History    Not on file   Tobacco Use    Smoking status: Never    Smokeless tobacco: Never   Vaping Use

## 2025-05-31 PROBLEM — I50.9 CONGESTIVE HEART FAILURE, UNSPECIFIED HF CHRONICITY, UNSPECIFIED HEART FAILURE TYPE (HCC): Status: ACTIVE | Noted: 2025-05-31

## 2025-05-31 ASSESSMENT — ENCOUNTER SYMPTOMS
BACK PAIN: 0
ABDOMINAL PAIN: 0
DIARRHEA: 0
EYE ITCHING: 0
VOMITING: 0
CONSTIPATION: 0
FACIAL SWELLING: 0
COLOR CHANGE: 0
EYE REDNESS: 0
EYE DISCHARGE: 0
SHORTNESS OF BREATH: 0
EYE PAIN: 0

## 2025-08-05 DIAGNOSIS — R42 VERTIGO: Primary | ICD-10-CM

## 2025-08-05 RX ORDER — MECLIZINE HYDROCHLORIDE 25 MG/1
25 TABLET ORAL 3 TIMES DAILY PRN
Qty: 30 TABLET | Refills: 0 | Status: SHIPPED | OUTPATIENT
Start: 2025-08-05 | End: 2025-08-15